# Patient Record
Sex: MALE | Race: WHITE | HISPANIC OR LATINO | URBAN - METROPOLITAN AREA
[De-identification: names, ages, dates, MRNs, and addresses within clinical notes are randomized per-mention and may not be internally consistent; named-entity substitution may affect disease eponyms.]

---

## 2020-01-01 ENCOUNTER — EMERGENCY (EMERGENCY)
Facility: HOSPITAL | Age: 50
LOS: 1 days | Discharge: HOME | End: 2020-01-01
Attending: STUDENT IN AN ORGANIZED HEALTH CARE EDUCATION/TRAINING PROGRAM
Payer: COMMERCIAL

## 2020-01-01 ENCOUNTER — INPATIENT (INPATIENT)
Facility: HOSPITAL | Age: 50
LOS: 14 days | DRG: 208 | End: 2020-08-27
Attending: HOSPITALIST | Admitting: STUDENT IN AN ORGANIZED HEALTH CARE EDUCATION/TRAINING PROGRAM
Payer: COMMERCIAL

## 2020-01-01 VITALS
WEIGHT: 186.07 LBS | RESPIRATION RATE: 20 BRPM | DIASTOLIC BLOOD PRESSURE: 83 MMHG | OXYGEN SATURATION: 95 % | TEMPERATURE: 100 F | HEIGHT: 65 IN | SYSTOLIC BLOOD PRESSURE: 132 MMHG | HEART RATE: 102 BPM

## 2020-01-01 VITALS
SYSTOLIC BLOOD PRESSURE: 127 MMHG | WEIGHT: 186.07 LBS | RESPIRATION RATE: 18 BRPM | OXYGEN SATURATION: 97 % | HEART RATE: 92 BPM | DIASTOLIC BLOOD PRESSURE: 87 MMHG | TEMPERATURE: 99 F | HEIGHT: 65 IN

## 2020-01-01 VITALS — RESPIRATION RATE: 34 BRPM | HEART RATE: 63 BPM | OXYGEN SATURATION: 60 %

## 2020-01-01 DIAGNOSIS — U07.1 COVID-19: ICD-10-CM

## 2020-01-01 LAB
A1C WITH ESTIMATED AVERAGE GLUCOSE RESULT: 11.7 % — HIGH (ref 4–5.6)
ALBUMIN SERPL ELPH-MCNC: 1.6 G/DL — LOW (ref 3.3–5.2)
ALBUMIN SERPL ELPH-MCNC: 1.6 G/DL — LOW (ref 3.3–5.2)
ALBUMIN SERPL ELPH-MCNC: 1.8 G/DL — LOW (ref 3.3–5.2)
ALBUMIN SERPL ELPH-MCNC: 2.3 G/DL — LOW (ref 3.3–5.2)
ALBUMIN SERPL ELPH-MCNC: 2.5 G/DL — LOW (ref 3.3–5.2)
ALBUMIN SERPL ELPH-MCNC: 2.7 G/DL — LOW (ref 3.3–5.2)
ALBUMIN SERPL ELPH-MCNC: 2.8 G/DL — LOW (ref 3.3–5.2)
ALBUMIN SERPL ELPH-MCNC: 2.9 G/DL — LOW (ref 3.3–5.2)
ALBUMIN SERPL ELPH-MCNC: 3 G/DL — LOW (ref 3.3–5.2)
ALBUMIN SERPL ELPH-MCNC: 3 G/DL — LOW (ref 3.3–5.2)
ALBUMIN SERPL ELPH-MCNC: 3.1 G/DL — LOW (ref 3.3–5.2)
ALBUMIN SERPL ELPH-MCNC: 3.3 G/DL — SIGNIFICANT CHANGE UP (ref 3.3–5.2)
ALBUMIN SERPL ELPH-MCNC: 3.6 G/DL — SIGNIFICANT CHANGE UP (ref 3.3–5.2)
ALBUMIN SERPL ELPH-MCNC: 3.6 G/DL — SIGNIFICANT CHANGE UP (ref 3.3–5.2)
ALBUMIN SERPL ELPH-MCNC: <1 G/DL — LOW (ref 3.3–5.2)
ALP SERPL-CCNC: 148 U/L — HIGH (ref 40–120)
ALP SERPL-CCNC: 194 U/L — HIGH (ref 40–120)
ALP SERPL-CCNC: 223 U/L — HIGH (ref 40–120)
ALP SERPL-CCNC: 224 U/L — HIGH (ref 40–120)
ALP SERPL-CCNC: 286 U/L — HIGH (ref 40–120)
ALP SERPL-CCNC: 314 U/L — HIGH (ref 40–120)
ALP SERPL-CCNC: 314 U/L — HIGH (ref 40–120)
ALP SERPL-CCNC: 66 U/L — SIGNIFICANT CHANGE UP (ref 40–120)
ALP SERPL-CCNC: 67 U/L — SIGNIFICANT CHANGE UP (ref 40–120)
ALP SERPL-CCNC: 68 U/L — SIGNIFICANT CHANGE UP (ref 40–120)
ALP SERPL-CCNC: 69 U/L — SIGNIFICANT CHANGE UP (ref 40–120)
ALP SERPL-CCNC: 69 U/L — SIGNIFICANT CHANGE UP (ref 40–120)
ALP SERPL-CCNC: 70 U/L — SIGNIFICANT CHANGE UP (ref 40–120)
ALP SERPL-CCNC: 73 U/L — SIGNIFICANT CHANGE UP (ref 40–120)
ALP SERPL-CCNC: 73 U/L — SIGNIFICANT CHANGE UP (ref 40–120)
ALP SERPL-CCNC: 84 U/L — SIGNIFICANT CHANGE UP (ref 40–120)
ALP SERPL-CCNC: 86 U/L — SIGNIFICANT CHANGE UP (ref 40–120)
ALP SERPL-CCNC: 91 U/L — SIGNIFICANT CHANGE UP (ref 40–120)
ALP SERPL-CCNC: 98 U/L — SIGNIFICANT CHANGE UP (ref 40–120)
ALT FLD-CCNC: 20 U/L — SIGNIFICANT CHANGE UP
ALT FLD-CCNC: 21 U/L — SIGNIFICANT CHANGE UP
ALT FLD-CCNC: 22 U/L — SIGNIFICANT CHANGE UP
ALT FLD-CCNC: 23 U/L — SIGNIFICANT CHANGE UP
ALT FLD-CCNC: 23 U/L — SIGNIFICANT CHANGE UP
ALT FLD-CCNC: 24 U/L — SIGNIFICANT CHANGE UP
ALT FLD-CCNC: 24 U/L — SIGNIFICANT CHANGE UP
ALT FLD-CCNC: 25 U/L — SIGNIFICANT CHANGE UP
ALT FLD-CCNC: 28 U/L — SIGNIFICANT CHANGE UP
ALT FLD-CCNC: 29 U/L — SIGNIFICANT CHANGE UP
ALT FLD-CCNC: 30 U/L — SIGNIFICANT CHANGE UP
ALT FLD-CCNC: 31 U/L — SIGNIFICANT CHANGE UP
ALT FLD-CCNC: 31 U/L — SIGNIFICANT CHANGE UP
ALT FLD-CCNC: 37 U/L — SIGNIFICANT CHANGE UP
ALT FLD-CCNC: >6450 U/L — HIGH
ANION GAP SERPL CALC-SCNC: 10 MMOL/L — SIGNIFICANT CHANGE UP (ref 5–17)
ANION GAP SERPL CALC-SCNC: 11 MMOL/L — SIGNIFICANT CHANGE UP (ref 5–17)
ANION GAP SERPL CALC-SCNC: 12 MMOL/L — SIGNIFICANT CHANGE UP (ref 5–17)
ANION GAP SERPL CALC-SCNC: 13 MMOL/L — SIGNIFICANT CHANGE UP (ref 5–17)
ANION GAP SERPL CALC-SCNC: 15 MMOL/L — SIGNIFICANT CHANGE UP (ref 5–17)
ANION GAP SERPL CALC-SCNC: 17 MMOL/L — SIGNIFICANT CHANGE UP (ref 5–17)
ANION GAP SERPL CALC-SCNC: 18 MMOL/L — HIGH (ref 5–17)
ANION GAP SERPL CALC-SCNC: 18 MMOL/L — HIGH (ref 5–17)
ANION GAP SERPL CALC-SCNC: 19 MMOL/L — HIGH (ref 5–17)
ANION GAP SERPL CALC-SCNC: 19 MMOL/L — HIGH (ref 5–17)
ANION GAP SERPL CALC-SCNC: 37 MMOL/L — HIGH (ref 5–17)
ANION GAP SERPL CALC-SCNC: 46 MMOL/L — HIGH (ref 5–17)
ANION GAP SERPL CALC-SCNC: 46 MMOL/L — HIGH (ref 5–17)
ANISOCYTOSIS BLD QL: SLIGHT — SIGNIFICANT CHANGE UP
APPEARANCE UR: CLEAR — SIGNIFICANT CHANGE UP
APTT BLD: 33.1 SEC — SIGNIFICANT CHANGE UP (ref 27.5–35.5)
APTT BLD: 80.8 SEC — HIGH (ref 27.5–35.5)
APTT BLD: 90.6 SEC — HIGH (ref 27.5–35.5)
AST SERPL-CCNC: 1040 U/L — HIGH
AST SERPL-CCNC: 12 U/L — SIGNIFICANT CHANGE UP
AST SERPL-CCNC: 13 U/L — SIGNIFICANT CHANGE UP
AST SERPL-CCNC: 14 U/L — SIGNIFICANT CHANGE UP
AST SERPL-CCNC: 15 U/L — SIGNIFICANT CHANGE UP
AST SERPL-CCNC: 16 U/L — SIGNIFICANT CHANGE UP
AST SERPL-CCNC: 21 U/L — SIGNIFICANT CHANGE UP
AST SERPL-CCNC: 24 U/L — SIGNIFICANT CHANGE UP
AST SERPL-CCNC: 27 U/L — SIGNIFICANT CHANGE UP
AST SERPL-CCNC: 31 U/L — SIGNIFICANT CHANGE UP
AST SERPL-CCNC: 32 U/L — SIGNIFICANT CHANGE UP
AST SERPL-CCNC: 44 U/L — HIGH
AST SERPL-CCNC: 991 U/L — HIGH
AST SERPL-CCNC: 991 U/L — HIGH
AST SERPL-CCNC: >7000 U/L — HIGH
BACTERIA # UR AUTO: ABNORMAL
BASE EXCESS BLDA CALC-SCNC: -1.6 MMOL/L — SIGNIFICANT CHANGE UP (ref -2–2)
BASE EXCESS BLDA CALC-SCNC: 2.1 MMOL/L — SIGNIFICANT CHANGE UP (ref -3–3)
BASOPHILS # BLD AUTO: 0 K/UL — SIGNIFICANT CHANGE UP (ref 0–0.2)
BASOPHILS # BLD AUTO: 0.01 K/UL — SIGNIFICANT CHANGE UP (ref 0–0.2)
BASOPHILS # BLD AUTO: 0.02 K/UL — SIGNIFICANT CHANGE UP (ref 0–0.2)
BASOPHILS NFR BLD AUTO: 0 % — SIGNIFICANT CHANGE UP (ref 0–2)
BASOPHILS NFR BLD AUTO: 0.1 % — SIGNIFICANT CHANGE UP (ref 0–2)
BASOPHILS NFR BLD AUTO: 0.2 % — SIGNIFICANT CHANGE UP (ref 0–2)
BASOPHILS NFR BLD AUTO: 0.2 % — SIGNIFICANT CHANGE UP (ref 0–2)
BILIRUB DIRECT SERPL-MCNC: 0.1 MG/DL — SIGNIFICANT CHANGE UP (ref 0–0.3)
BILIRUB DIRECT SERPL-MCNC: 0.2 MG/DL — SIGNIFICANT CHANGE UP (ref 0–0.3)
BILIRUB DIRECT SERPL-MCNC: 1.4 MG/DL — HIGH (ref 0–0.3)
BILIRUB DIRECT SERPL-MCNC: 2.7 MG/DL — HIGH (ref 0–0.3)
BILIRUB INDIRECT FLD-MCNC: 0.1 MG/DL — LOW (ref 0.2–1)
BILIRUB INDIRECT FLD-MCNC: 0.2 MG/DL — SIGNIFICANT CHANGE UP (ref 0.2–1)
BILIRUB INDIRECT FLD-MCNC: 0.3 MG/DL — SIGNIFICANT CHANGE UP (ref 0.2–1)
BILIRUB INDIRECT FLD-MCNC: 0.4 MG/DL — SIGNIFICANT CHANGE UP (ref 0.2–1)
BILIRUB INDIRECT FLD-MCNC: 0.5 MG/DL — SIGNIFICANT CHANGE UP (ref 0.2–1)
BILIRUB INDIRECT FLD-MCNC: 0.8 MG/DL — SIGNIFICANT CHANGE UP (ref 0.2–1)
BILIRUB INDIRECT FLD-MCNC: 1.2 MG/DL — HIGH (ref 0.2–1)
BILIRUB SERPL-MCNC: 0.2 MG/DL — LOW (ref 0.4–2)
BILIRUB SERPL-MCNC: 0.3 MG/DL — LOW (ref 0.4–2)
BILIRUB SERPL-MCNC: 0.4 MG/DL — SIGNIFICANT CHANGE UP (ref 0.4–2)
BILIRUB SERPL-MCNC: 0.5 MG/DL — SIGNIFICANT CHANGE UP (ref 0.4–2)
BILIRUB SERPL-MCNC: 0.6 MG/DL — SIGNIFICANT CHANGE UP (ref 0.4–2)
BILIRUB SERPL-MCNC: 1.9 MG/DL — SIGNIFICANT CHANGE UP (ref 0.4–2)
BILIRUB SERPL-MCNC: 2.2 MG/DL — HIGH (ref 0.4–2)
BILIRUB SERPL-MCNC: 2.4 MG/DL — HIGH (ref 0.4–2)
BILIRUB SERPL-MCNC: 2.8 MG/DL — HIGH (ref 0.4–2)
BILIRUB SERPL-MCNC: 3.9 MG/DL — HIGH (ref 0.4–2)
BILIRUB SERPL-MCNC: 3.9 MG/DL — HIGH (ref 0.4–2)
BILIRUB UR-MCNC: NEGATIVE — SIGNIFICANT CHANGE UP
BLD GP AB SCN SERPL QL: SIGNIFICANT CHANGE UP
BLOOD GAS COMMENTS ARTERIAL: SIGNIFICANT CHANGE UP
BLOOD GAS COMMENTS ARTERIAL: SIGNIFICANT CHANGE UP
BUN SERPL-MCNC: 10 MG/DL — SIGNIFICANT CHANGE UP (ref 8–20)
BUN SERPL-MCNC: 14 MG/DL — SIGNIFICANT CHANGE UP (ref 8–20)
BUN SERPL-MCNC: 15 MG/DL — SIGNIFICANT CHANGE UP (ref 8–20)
BUN SERPL-MCNC: 16 MG/DL — SIGNIFICANT CHANGE UP (ref 8–20)
BUN SERPL-MCNC: 16 MG/DL — SIGNIFICANT CHANGE UP (ref 8–20)
BUN SERPL-MCNC: 17 MG/DL — SIGNIFICANT CHANGE UP (ref 8–20)
BUN SERPL-MCNC: 18 MG/DL — SIGNIFICANT CHANGE UP (ref 8–20)
BUN SERPL-MCNC: 19 MG/DL — SIGNIFICANT CHANGE UP (ref 8–20)
BUN SERPL-MCNC: 19 MG/DL — SIGNIFICANT CHANGE UP (ref 8–20)
BUN SERPL-MCNC: 23 MG/DL — HIGH (ref 8–20)
BUN SERPL-MCNC: 40 MG/DL — HIGH (ref 8–20)
BUN SERPL-MCNC: 45 MG/DL — HIGH (ref 8–20)
BUN SERPL-MCNC: 48 MG/DL — HIGH (ref 8–20)
CALCIUM SERPL-MCNC: 10.2 MG/DL — SIGNIFICANT CHANGE UP (ref 8.6–10.2)
CALCIUM SERPL-MCNC: 12.2 MG/DL — HIGH (ref 8.6–10.2)
CALCIUM SERPL-MCNC: 4.2 MG/DL — CRITICAL LOW (ref 8.6–10.2)
CALCIUM SERPL-MCNC: 6 MG/DL — CRITICAL LOW (ref 8.6–10.2)
CALCIUM SERPL-MCNC: 7.2 MG/DL — LOW (ref 8.6–10.2)
CALCIUM SERPL-MCNC: 8.3 MG/DL — LOW (ref 8.6–10.2)
CALCIUM SERPL-MCNC: 8.6 MG/DL — SIGNIFICANT CHANGE UP (ref 8.6–10.2)
CALCIUM SERPL-MCNC: 8.8 MG/DL — SIGNIFICANT CHANGE UP (ref 8.6–10.2)
CALCIUM SERPL-MCNC: 8.9 MG/DL — SIGNIFICANT CHANGE UP (ref 8.6–10.2)
CALCIUM SERPL-MCNC: 8.9 MG/DL — SIGNIFICANT CHANGE UP (ref 8.6–10.2)
CALCIUM SERPL-MCNC: 9.2 MG/DL — SIGNIFICANT CHANGE UP (ref 8.6–10.2)
CALCIUM SERPL-MCNC: 9.2 MG/DL — SIGNIFICANT CHANGE UP (ref 8.6–10.2)
CALCIUM SERPL-MCNC: 9.3 MG/DL — SIGNIFICANT CHANGE UP (ref 8.6–10.2)
CHLORIDE SERPL-SCNC: 108 MMOL/L — HIGH (ref 98–107)
CHLORIDE SERPL-SCNC: 118 MMOL/L — HIGH (ref 98–107)
CHLORIDE SERPL-SCNC: 92 MMOL/L — LOW (ref 98–107)
CHLORIDE SERPL-SCNC: 94 MMOL/L — LOW (ref 98–107)
CHLORIDE SERPL-SCNC: 94 MMOL/L — LOW (ref 98–107)
CHLORIDE SERPL-SCNC: 95 MMOL/L — LOW (ref 98–107)
CHLORIDE SERPL-SCNC: 95 MMOL/L — LOW (ref 98–107)
CHLORIDE SERPL-SCNC: 96 MMOL/L — LOW (ref 98–107)
CHLORIDE SERPL-SCNC: 97 MMOL/L — LOW (ref 98–107)
CHLORIDE SERPL-SCNC: 99 MMOL/L — SIGNIFICANT CHANGE UP (ref 98–107)
CK SERPL-CCNC: 35 U/L — SIGNIFICANT CHANGE UP (ref 30–200)
CO2 SERPL-SCNC: 12 MMOL/L — LOW (ref 22–29)
CO2 SERPL-SCNC: 13 MMOL/L — LOW (ref 22–29)
CO2 SERPL-SCNC: 14 MMOL/L — LOW (ref 22–29)
CO2 SERPL-SCNC: 15 MMOL/L — LOW (ref 22–29)
CO2 SERPL-SCNC: 19 MMOL/L — LOW (ref 22–29)
CO2 SERPL-SCNC: 21 MMOL/L — LOW (ref 22–29)
CO2 SERPL-SCNC: 22 MMOL/L — SIGNIFICANT CHANGE UP (ref 22–29)
CO2 SERPL-SCNC: 22 MMOL/L — SIGNIFICANT CHANGE UP (ref 22–29)
CO2 SERPL-SCNC: 23 MMOL/L — SIGNIFICANT CHANGE UP (ref 22–29)
CO2 SERPL-SCNC: 24 MMOL/L — SIGNIFICANT CHANGE UP (ref 22–29)
CO2 SERPL-SCNC: 25 MMOL/L — SIGNIFICANT CHANGE UP (ref 22–29)
CO2 SERPL-SCNC: 25 MMOL/L — SIGNIFICANT CHANGE UP (ref 22–29)
CO2 SERPL-SCNC: 28 MMOL/L — SIGNIFICANT CHANGE UP (ref 22–29)
COLOR SPEC: YELLOW — SIGNIFICANT CHANGE UP
CREAT SERPL-MCNC: 0.38 MG/DL — LOW (ref 0.5–1.3)
CREAT SERPL-MCNC: 0.4 MG/DL — LOW (ref 0.5–1.3)
CREAT SERPL-MCNC: 0.42 MG/DL — LOW (ref 0.5–1.3)
CREAT SERPL-MCNC: 0.43 MG/DL — LOW (ref 0.5–1.3)
CREAT SERPL-MCNC: 0.43 MG/DL — LOW (ref 0.5–1.3)
CREAT SERPL-MCNC: 0.45 MG/DL — LOW (ref 0.5–1.3)
CREAT SERPL-MCNC: 0.47 MG/DL — LOW (ref 0.5–1.3)
CREAT SERPL-MCNC: 0.49 MG/DL — LOW (ref 0.5–1.3)
CREAT SERPL-MCNC: 0.49 MG/DL — LOW (ref 0.5–1.3)
CREAT SERPL-MCNC: 0.5 MG/DL — SIGNIFICANT CHANGE UP (ref 0.5–1.3)
CREAT SERPL-MCNC: 0.51 MG/DL — SIGNIFICANT CHANGE UP (ref 0.5–1.3)
CREAT SERPL-MCNC: 0.51 MG/DL — SIGNIFICANT CHANGE UP (ref 0.5–1.3)
CREAT SERPL-MCNC: 0.52 MG/DL — SIGNIFICANT CHANGE UP (ref 0.5–1.3)
CREAT SERPL-MCNC: 0.54 MG/DL — SIGNIFICANT CHANGE UP (ref 0.5–1.3)
CREAT SERPL-MCNC: 0.58 MG/DL — SIGNIFICANT CHANGE UP (ref 0.5–1.3)
CREAT SERPL-MCNC: 0.58 MG/DL — SIGNIFICANT CHANGE UP (ref 0.5–1.3)
CREAT SERPL-MCNC: 0.69 MG/DL — SIGNIFICANT CHANGE UP (ref 0.5–1.3)
CREAT SERPL-MCNC: 0.78 MG/DL — SIGNIFICANT CHANGE UP (ref 0.5–1.3)
CREAT SERPL-MCNC: 0.78 MG/DL — SIGNIFICANT CHANGE UP (ref 0.5–1.3)
CREAT SERPL-MCNC: 1.68 MG/DL — HIGH (ref 0.5–1.3)
CREAT SERPL-MCNC: 1.89 MG/DL — HIGH (ref 0.5–1.3)
CREAT SERPL-MCNC: 2.03 MG/DL — HIGH (ref 0.5–1.3)
CREAT SERPL-MCNC: 2.77 MG/DL — HIGH (ref 0.5–1.3)
CRP SERPL-MCNC: 0.7 MG/DL — HIGH (ref 0–0.4)
CRP SERPL-MCNC: 0.72 MG/DL — HIGH (ref 0–0.4)
CRP SERPL-MCNC: 1.01 MG/DL — HIGH (ref 0–0.4)
CRP SERPL-MCNC: 1.09 MG/DL — HIGH (ref 0–0.4)
CRP SERPL-MCNC: 1.12 MG/DL — HIGH (ref 0–0.4)
CRP SERPL-MCNC: 1.22 MG/DL — HIGH (ref 0–0.4)
CRP SERPL-MCNC: 1.32 MG/DL — HIGH (ref 0–0.4)
CRP SERPL-MCNC: 1.39 MG/DL — HIGH (ref 0–0.4)
CRP SERPL-MCNC: 12.22 MG/DL — HIGH (ref 0–0.4)
CRP SERPL-MCNC: 13.83 MG/DL — HIGH (ref 0–0.4)
CRP SERPL-MCNC: 2.04 MG/DL — HIGH (ref 0–0.4)
CRP SERPL-MCNC: 4.6 MG/DL — HIGH (ref 0–0.4)
CRP SERPL-MCNC: 4.98 MG/DL — HIGH (ref 0–0.4)
CRP SERPL-MCNC: 5.38 MG/DL — HIGH (ref 0–0.4)
CRP SERPL-MCNC: 7.06 MG/DL — HIGH (ref 0–0.4)
CRP SERPL-MCNC: 7.96 MG/DL — HIGH (ref 0–0.4)
CULTURE RESULTS: SIGNIFICANT CHANGE UP
D DIMER BLD IA.RAPID-MCNC: 161 NG/ML DDU — SIGNIFICANT CHANGE UP
D DIMER BLD IA.RAPID-MCNC: <150 NG/ML DDU — SIGNIFICANT CHANGE UP
D DIMER BLD IA.RAPID-MCNC: HIGH NG/ML DDU
DIFF PNL FLD: ABNORMAL
EOSINOPHIL # BLD AUTO: 0 K/UL — SIGNIFICANT CHANGE UP (ref 0–0.5)
EOSINOPHIL NFR BLD AUTO: 0 % — SIGNIFICANT CHANGE UP (ref 0–6)
EPI CELLS # UR: SIGNIFICANT CHANGE UP
ESTIMATED AVERAGE GLUCOSE: 289 MG/DL — HIGH (ref 68–114)
FERRITIN SERPL-MCNC: 1033 NG/ML — HIGH (ref 30–400)
FERRITIN SERPL-MCNC: 1182 NG/ML — HIGH (ref 30–400)
FERRITIN SERPL-MCNC: 1275 NG/ML — HIGH (ref 30–400)
FERRITIN SERPL-MCNC: 1391 NG/ML — HIGH (ref 30–400)
FERRITIN SERPL-MCNC: 1445 NG/ML — HIGH (ref 30–400)
FERRITIN SERPL-MCNC: 836 NG/ML — HIGH (ref 30–400)
FERRITIN SERPL-MCNC: 852 NG/ML — HIGH (ref 30–400)
FERRITIN SERPL-MCNC: 869 NG/ML — HIGH (ref 30–400)
FERRITIN SERPL-MCNC: 876 NG/ML — HIGH (ref 30–400)
FERRITIN SERPL-MCNC: 882 NG/ML — HIGH (ref 30–400)
FERRITIN SERPL-MCNC: 883 NG/ML — HIGH (ref 30–400)
FERRITIN SERPL-MCNC: 905 NG/ML — HIGH (ref 30–400)
FERRITIN SERPL-MCNC: 939 NG/ML — HIGH (ref 30–400)
FERRITIN SERPL-MCNC: HIGH NG/ML (ref 30–400)
FIBRINOGEN PPP-MCNC: 1064 MG/DL — CRITICAL HIGH (ref 290–520)
GAS PNL BLDA: SIGNIFICANT CHANGE UP
GLUCOSE BLDC GLUCOMTR-MCNC: 102 MG/DL — HIGH (ref 70–99)
GLUCOSE BLDC GLUCOMTR-MCNC: 119 MG/DL — HIGH (ref 70–99)
GLUCOSE BLDC GLUCOMTR-MCNC: 128 MG/DL — HIGH (ref 70–99)
GLUCOSE BLDC GLUCOMTR-MCNC: 140 MG/DL — HIGH (ref 70–99)
GLUCOSE BLDC GLUCOMTR-MCNC: 158 MG/DL — HIGH (ref 70–99)
GLUCOSE BLDC GLUCOMTR-MCNC: 167 MG/DL — HIGH (ref 70–99)
GLUCOSE BLDC GLUCOMTR-MCNC: 170 MG/DL — HIGH (ref 70–99)
GLUCOSE BLDC GLUCOMTR-MCNC: 171 MG/DL — HIGH (ref 70–99)
GLUCOSE BLDC GLUCOMTR-MCNC: 172 MG/DL — HIGH (ref 70–99)
GLUCOSE BLDC GLUCOMTR-MCNC: 176 MG/DL — HIGH (ref 70–99)
GLUCOSE BLDC GLUCOMTR-MCNC: 179 MG/DL — HIGH (ref 70–99)
GLUCOSE BLDC GLUCOMTR-MCNC: 179 MG/DL — HIGH (ref 70–99)
GLUCOSE BLDC GLUCOMTR-MCNC: 182 MG/DL — HIGH (ref 70–99)
GLUCOSE BLDC GLUCOMTR-MCNC: 182 MG/DL — HIGH (ref 70–99)
GLUCOSE BLDC GLUCOMTR-MCNC: 190 MG/DL — HIGH (ref 70–99)
GLUCOSE BLDC GLUCOMTR-MCNC: 193 MG/DL — HIGH (ref 70–99)
GLUCOSE BLDC GLUCOMTR-MCNC: 195 MG/DL — HIGH (ref 70–99)
GLUCOSE BLDC GLUCOMTR-MCNC: 210 MG/DL — HIGH (ref 70–99)
GLUCOSE BLDC GLUCOMTR-MCNC: 219 MG/DL — HIGH (ref 70–99)
GLUCOSE BLDC GLUCOMTR-MCNC: 220 MG/DL — HIGH (ref 70–99)
GLUCOSE BLDC GLUCOMTR-MCNC: 221 MG/DL — HIGH (ref 70–99)
GLUCOSE BLDC GLUCOMTR-MCNC: 221 MG/DL — HIGH (ref 70–99)
GLUCOSE BLDC GLUCOMTR-MCNC: 222 MG/DL — HIGH (ref 70–99)
GLUCOSE BLDC GLUCOMTR-MCNC: 222 MG/DL — HIGH (ref 70–99)
GLUCOSE BLDC GLUCOMTR-MCNC: 226 MG/DL — HIGH (ref 70–99)
GLUCOSE BLDC GLUCOMTR-MCNC: 226 MG/DL — HIGH (ref 70–99)
GLUCOSE BLDC GLUCOMTR-MCNC: 230 MG/DL — HIGH (ref 70–99)
GLUCOSE BLDC GLUCOMTR-MCNC: 233 MG/DL — HIGH (ref 70–99)
GLUCOSE BLDC GLUCOMTR-MCNC: 234 MG/DL — HIGH (ref 70–99)
GLUCOSE BLDC GLUCOMTR-MCNC: 234 MG/DL — HIGH (ref 70–99)
GLUCOSE BLDC GLUCOMTR-MCNC: 239 MG/DL — HIGH (ref 70–99)
GLUCOSE BLDC GLUCOMTR-MCNC: 242 MG/DL — HIGH (ref 70–99)
GLUCOSE BLDC GLUCOMTR-MCNC: 247 MG/DL — HIGH (ref 70–99)
GLUCOSE BLDC GLUCOMTR-MCNC: 247 MG/DL — HIGH (ref 70–99)
GLUCOSE BLDC GLUCOMTR-MCNC: 253 MG/DL — HIGH (ref 70–99)
GLUCOSE BLDC GLUCOMTR-MCNC: 255 MG/DL — HIGH (ref 70–99)
GLUCOSE BLDC GLUCOMTR-MCNC: 259 MG/DL — HIGH (ref 70–99)
GLUCOSE BLDC GLUCOMTR-MCNC: 261 MG/DL — HIGH (ref 70–99)
GLUCOSE BLDC GLUCOMTR-MCNC: 266 MG/DL — HIGH (ref 70–99)
GLUCOSE BLDC GLUCOMTR-MCNC: 269 MG/DL — HIGH (ref 70–99)
GLUCOSE BLDC GLUCOMTR-MCNC: 270 MG/DL — HIGH (ref 70–99)
GLUCOSE BLDC GLUCOMTR-MCNC: 271 MG/DL — HIGH (ref 70–99)
GLUCOSE BLDC GLUCOMTR-MCNC: 275 MG/DL — HIGH (ref 70–99)
GLUCOSE BLDC GLUCOMTR-MCNC: 277 MG/DL — HIGH (ref 70–99)
GLUCOSE BLDC GLUCOMTR-MCNC: 290 MG/DL — HIGH (ref 70–99)
GLUCOSE BLDC GLUCOMTR-MCNC: 292 MG/DL — HIGH (ref 70–99)
GLUCOSE BLDC GLUCOMTR-MCNC: 292 MG/DL — HIGH (ref 70–99)
GLUCOSE BLDC GLUCOMTR-MCNC: 300 MG/DL — HIGH (ref 70–99)
GLUCOSE BLDC GLUCOMTR-MCNC: 309 MG/DL — HIGH (ref 70–99)
GLUCOSE BLDC GLUCOMTR-MCNC: 31 MG/DL — CRITICAL LOW (ref 70–99)
GLUCOSE BLDC GLUCOMTR-MCNC: 311 MG/DL — HIGH (ref 70–99)
GLUCOSE BLDC GLUCOMTR-MCNC: 314 MG/DL — HIGH (ref 70–99)
GLUCOSE BLDC GLUCOMTR-MCNC: 322 MG/DL — HIGH (ref 70–99)
GLUCOSE BLDC GLUCOMTR-MCNC: 325 MG/DL — HIGH (ref 70–99)
GLUCOSE BLDC GLUCOMTR-MCNC: 329 MG/DL — HIGH (ref 70–99)
GLUCOSE BLDC GLUCOMTR-MCNC: 335 MG/DL — HIGH (ref 70–99)
GLUCOSE BLDC GLUCOMTR-MCNC: 351 MG/DL — HIGH (ref 70–99)
GLUCOSE BLDC GLUCOMTR-MCNC: 384 MG/DL — HIGH (ref 70–99)
GLUCOSE BLDC GLUCOMTR-MCNC: 48 MG/DL — LOW (ref 70–99)
GLUCOSE BLDC GLUCOMTR-MCNC: 89 MG/DL — SIGNIFICANT CHANGE UP (ref 70–99)
GLUCOSE BLDC GLUCOMTR-MCNC: 99 MG/DL — SIGNIFICANT CHANGE UP (ref 70–99)
GLUCOSE SERPL-MCNC: 174 MG/DL — HIGH (ref 70–99)
GLUCOSE SERPL-MCNC: 192 MG/DL — HIGH (ref 70–99)
GLUCOSE SERPL-MCNC: 215 MG/DL — HIGH (ref 70–99)
GLUCOSE SERPL-MCNC: 217 MG/DL — HIGH (ref 70–99)
GLUCOSE SERPL-MCNC: 233 MG/DL — HIGH (ref 70–99)
GLUCOSE SERPL-MCNC: 261 MG/DL — HIGH (ref 70–99)
GLUCOSE SERPL-MCNC: 272 MG/DL — HIGH (ref 70–99)
GLUCOSE SERPL-MCNC: 294 MG/DL — HIGH (ref 70–99)
GLUCOSE SERPL-MCNC: 302 MG/DL — HIGH (ref 70–99)
GLUCOSE SERPL-MCNC: 346 MG/DL — HIGH (ref 70–99)
GLUCOSE SERPL-MCNC: 376 MG/DL — HIGH (ref 70–99)
GLUCOSE SERPL-MCNC: 65 MG/DL — LOW (ref 70–99)
GLUCOSE SERPL-MCNC: 73 MG/DL — SIGNIFICANT CHANGE UP (ref 70–99)
GLUCOSE UR QL: 1000 MG/DL
HCO3 BLDA-SCNC: 23 MMOL/L — SIGNIFICANT CHANGE UP (ref 20–26)
HCO3 BLDA-SCNC: 26 MMOL/L — SIGNIFICANT CHANGE UP (ref 20–26)
HCT VFR BLD CALC: 36.1 % — LOW (ref 39–50)
HCT VFR BLD CALC: 41.2 % — SIGNIFICANT CHANGE UP (ref 39–50)
HCT VFR BLD CALC: 42.3 % — SIGNIFICANT CHANGE UP (ref 39–50)
HCT VFR BLD CALC: 46.2 % — SIGNIFICANT CHANGE UP (ref 39–50)
HCT VFR BLD CALC: 47.5 % — SIGNIFICANT CHANGE UP (ref 39–50)
HCT VFR BLD CALC: 48 % — SIGNIFICANT CHANGE UP (ref 39–50)
HCT VFR BLD CALC: 48.1 % — SIGNIFICANT CHANGE UP (ref 39–50)
HCT VFR BLD CALC: 48.5 % — SIGNIFICANT CHANGE UP (ref 39–50)
HCT VFR BLD CALC: 49 % — SIGNIFICANT CHANGE UP (ref 39–50)
HCT VFR BLD CALC: 49 % — SIGNIFICANT CHANGE UP (ref 39–50)
HCT VFR BLD CALC: 53.6 % — HIGH (ref 39–50)
HCT VFR BLD CALC: 53.7 % — HIGH (ref 39–50)
HGB BLD-MCNC: 10.8 G/DL — LOW (ref 13–17)
HGB BLD-MCNC: 12.4 G/DL — LOW (ref 13–17)
HGB BLD-MCNC: 12.6 G/DL — LOW (ref 13–17)
HGB BLD-MCNC: 14.6 G/DL — SIGNIFICANT CHANGE UP (ref 13–17)
HGB BLD-MCNC: 15.7 G/DL — SIGNIFICANT CHANGE UP (ref 13–17)
HGB BLD-MCNC: 16 G/DL — SIGNIFICANT CHANGE UP (ref 13–17)
HGB BLD-MCNC: 16.1 G/DL — SIGNIFICANT CHANGE UP (ref 13–17)
HGB BLD-MCNC: 16.4 G/DL — SIGNIFICANT CHANGE UP (ref 13–17)
HGB BLD-MCNC: 16.5 G/DL — SIGNIFICANT CHANGE UP (ref 13–17)
HGB BLD-MCNC: 16.6 G/DL — SIGNIFICANT CHANGE UP (ref 13–17)
HGB BLD-MCNC: 17.1 G/DL — HIGH (ref 13–17)
HGB BLD-MCNC: 18 G/DL — HIGH (ref 13–17)
HOROWITZ INDEX BLDA+IHG-RTO: SIGNIFICANT CHANGE UP
HOROWITZ INDEX BLDA+IHG-RTO: SIGNIFICANT CHANGE UP
IMM GRANULOCYTES NFR BLD AUTO: 0.6 % — SIGNIFICANT CHANGE UP (ref 0–1.5)
IMM GRANULOCYTES NFR BLD AUTO: 0.7 % — SIGNIFICANT CHANGE UP (ref 0–1.5)
IMM GRANULOCYTES NFR BLD AUTO: 0.7 % — SIGNIFICANT CHANGE UP (ref 0–1.5)
IMM GRANULOCYTES NFR BLD AUTO: 0.9 % — SIGNIFICANT CHANGE UP (ref 0–1.5)
IMM GRANULOCYTES NFR BLD AUTO: 1.3 % — SIGNIFICANT CHANGE UP (ref 0–1.5)
INR BLD: 1.15 RATIO — SIGNIFICANT CHANGE UP (ref 0.88–1.16)
INR BLD: 10.18 RATIO — CRITICAL HIGH (ref 0.88–1.16)
INR BLD: 2.11 RATIO — HIGH (ref 0.88–1.16)
KETONES UR-MCNC: ABNORMAL
LACTATE SERPL-SCNC: 10.1 MMOL/L — CRITICAL HIGH (ref 0.5–2)
LACTATE SERPL-SCNC: 19.1 MMOL/L — CRITICAL HIGH (ref 0.5–2)
LDH SERPL L TO P-CCNC: 272 U/L — HIGH (ref 98–192)
LDH SERPL L TO P-CCNC: 304 U/L — HIGH (ref 98–192)
LDH SERPL L TO P-CCNC: 313 U/L — HIGH (ref 98–192)
LDH SERPL L TO P-CCNC: 334 U/L — HIGH (ref 98–192)
LDH SERPL L TO P-CCNC: 340 U/L — HIGH (ref 98–192)
LDH SERPL L TO P-CCNC: 341 U/L — HIGH (ref 98–192)
LDH SERPL L TO P-CCNC: 359 U/L — HIGH (ref 98–192)
LDH SERPL L TO P-CCNC: 362 U/L — HIGH (ref 98–192)
LDH SERPL L TO P-CCNC: 370 U/L — HIGH (ref 98–192)
LDH SERPL L TO P-CCNC: 408 U/L — HIGH (ref 98–192)
LDH SERPL L TO P-CCNC: 428 U/L — HIGH (ref 98–192)
LDH SERPL L TO P-CCNC: 444 U/L — HIGH (ref 98–192)
LDH SERPL L TO P-CCNC: 496 U/L — HIGH (ref 98–192)
LDH SERPL L TO P-CCNC: >2332 U/L — HIGH (ref 98–192)
LDH SERPL L TO P-CCNC: >2332 U/L — HIGH (ref 98–192)
LEUKOCYTE ESTERASE UR-ACNC: ABNORMAL
LYMPHOCYTES # BLD AUTO: 0.45 K/UL — LOW (ref 1–3.3)
LYMPHOCYTES # BLD AUTO: 0.79 K/UL — LOW (ref 1–3.3)
LYMPHOCYTES # BLD AUTO: 0.83 K/UL — LOW (ref 1–3.3)
LYMPHOCYTES # BLD AUTO: 0.91 K/UL — LOW (ref 1–3.3)
LYMPHOCYTES # BLD AUTO: 1.12 K/UL — SIGNIFICANT CHANGE UP (ref 1–3.3)
LYMPHOCYTES # BLD AUTO: 1.63 K/UL — SIGNIFICANT CHANGE UP (ref 1–3.3)
LYMPHOCYTES # BLD AUTO: 10.4 % — LOW (ref 13–44)
LYMPHOCYTES # BLD AUTO: 13.3 % — SIGNIFICANT CHANGE UP (ref 13–44)
LYMPHOCYTES # BLD AUTO: 14.7 % — SIGNIFICANT CHANGE UP (ref 13–44)
LYMPHOCYTES # BLD AUTO: 19 % — SIGNIFICANT CHANGE UP (ref 13–44)
LYMPHOCYTES # BLD AUTO: 2.2 % — LOW (ref 13–44)
LYMPHOCYTES # BLD AUTO: 7.8 % — LOW (ref 13–44)
MACROCYTES BLD QL: SLIGHT — SIGNIFICANT CHANGE UP
MAGNESIUM SERPL-MCNC: 1.5 MG/DL — LOW (ref 1.6–2.6)
MAGNESIUM SERPL-MCNC: 2.3 MG/DL — SIGNIFICANT CHANGE UP (ref 1.6–2.6)
MAGNESIUM SERPL-MCNC: 2.3 MG/DL — SIGNIFICANT CHANGE UP (ref 1.8–2.6)
MAGNESIUM SERPL-MCNC: 2.3 MG/DL — SIGNIFICANT CHANGE UP (ref 1.8–2.6)
MAGNESIUM SERPL-MCNC: 2.4 MG/DL — SIGNIFICANT CHANGE UP (ref 1.6–2.6)
MAGNESIUM SERPL-MCNC: 2.5 MG/DL — SIGNIFICANT CHANGE UP (ref 1.6–2.6)
MAGNESIUM SERPL-MCNC: 3.7 MG/DL — HIGH (ref 1.6–2.6)
MAGNESIUM SERPL-MCNC: 4.1 MG/DL — HIGH (ref 1.6–2.6)
MANUAL SMEAR VERIFICATION: SIGNIFICANT CHANGE UP
MCHC RBC-ENTMCNC: 29.8 GM/DL — LOW (ref 32–36)
MCHC RBC-ENTMCNC: 29.9 GM/DL — LOW (ref 32–36)
MCHC RBC-ENTMCNC: 30.1 GM/DL — LOW (ref 32–36)
MCHC RBC-ENTMCNC: 30.3 PG — SIGNIFICANT CHANGE UP (ref 27–34)
MCHC RBC-ENTMCNC: 30.5 PG — SIGNIFICANT CHANGE UP (ref 27–34)
MCHC RBC-ENTMCNC: 30.6 PG — SIGNIFICANT CHANGE UP (ref 27–34)
MCHC RBC-ENTMCNC: 30.7 GM/DL — LOW (ref 32–36)
MCHC RBC-ENTMCNC: 30.7 PG — SIGNIFICANT CHANGE UP (ref 27–34)
MCHC RBC-ENTMCNC: 30.8 PG — SIGNIFICANT CHANGE UP (ref 27–34)
MCHC RBC-ENTMCNC: 30.9 GM/DL — LOW (ref 32–36)
MCHC RBC-ENTMCNC: 30.9 PG — SIGNIFICANT CHANGE UP (ref 27–34)
MCHC RBC-ENTMCNC: 30.9 PG — SIGNIFICANT CHANGE UP (ref 27–34)
MCHC RBC-ENTMCNC: 31 PG — SIGNIFICANT CHANGE UP (ref 27–34)
MCHC RBC-ENTMCNC: 31 PG — SIGNIFICANT CHANGE UP (ref 27–34)
MCHC RBC-ENTMCNC: 31.1 PG — SIGNIFICANT CHANGE UP (ref 27–34)
MCHC RBC-ENTMCNC: 31.2 PG — SIGNIFICANT CHANGE UP (ref 27–34)
MCHC RBC-ENTMCNC: 31.5 PG — SIGNIFICANT CHANGE UP (ref 27–34)
MCHC RBC-ENTMCNC: 33.3 GM/DL — SIGNIFICANT CHANGE UP (ref 32–36)
MCHC RBC-ENTMCNC: 33.5 GM/DL — SIGNIFICANT CHANGE UP (ref 32–36)
MCHC RBC-ENTMCNC: 33.5 GM/DL — SIGNIFICANT CHANGE UP (ref 32–36)
MCHC RBC-ENTMCNC: 33.6 GM/DL — SIGNIFICANT CHANGE UP (ref 32–36)
MCHC RBC-ENTMCNC: 34 GM/DL — SIGNIFICANT CHANGE UP (ref 32–36)
MCHC RBC-ENTMCNC: 34 GM/DL — SIGNIFICANT CHANGE UP (ref 32–36)
MCHC RBC-ENTMCNC: 34.9 GM/DL — SIGNIFICANT CHANGE UP (ref 32–36)
MCV RBC AUTO: 101.1 FL — HIGH (ref 80–100)
MCV RBC AUTO: 103.5 FL — HIGH (ref 80–100)
MCV RBC AUTO: 103.7 FL — HIGH (ref 80–100)
MCV RBC AUTO: 103.9 FL — HIGH (ref 80–100)
MCV RBC AUTO: 90.2 FL — SIGNIFICANT CHANGE UP (ref 80–100)
MCV RBC AUTO: 90.2 FL — SIGNIFICANT CHANGE UP (ref 80–100)
MCV RBC AUTO: 90.8 FL — SIGNIFICANT CHANGE UP (ref 80–100)
MCV RBC AUTO: 90.8 FL — SIGNIFICANT CHANGE UP (ref 80–100)
MCV RBC AUTO: 91.1 FL — SIGNIFICANT CHANGE UP (ref 80–100)
MCV RBC AUTO: 91.4 FL — SIGNIFICANT CHANGE UP (ref 80–100)
MCV RBC AUTO: 92 FL — SIGNIFICANT CHANGE UP (ref 80–100)
MCV RBC AUTO: 99.8 FL — SIGNIFICANT CHANGE UP (ref 80–100)
METAMYELOCYTES # FLD: 0.9 % — HIGH (ref 0–0)
MONOCYTES # BLD AUTO: 0.21 K/UL — SIGNIFICANT CHANGE UP (ref 0–0.9)
MONOCYTES # BLD AUTO: 0.29 K/UL — SIGNIFICANT CHANGE UP (ref 0–0.9)
MONOCYTES # BLD AUTO: 0.46 K/UL — SIGNIFICANT CHANGE UP (ref 0–0.9)
MONOCYTES # BLD AUTO: 0.58 K/UL — SIGNIFICANT CHANGE UP (ref 0–0.9)
MONOCYTES # BLD AUTO: 0.9 K/UL — SIGNIFICANT CHANGE UP (ref 0–0.9)
MONOCYTES # BLD AUTO: 1.08 K/UL — HIGH (ref 0–0.9)
MONOCYTES NFR BLD AUTO: 3.7 % — SIGNIFICANT CHANGE UP (ref 2–14)
MONOCYTES NFR BLD AUTO: 4.4 % — SIGNIFICANT CHANGE UP (ref 2–14)
MONOCYTES NFR BLD AUTO: 5.2 % — SIGNIFICANT CHANGE UP (ref 2–14)
MONOCYTES NFR BLD AUTO: 5.5 % — SIGNIFICANT CHANGE UP (ref 2–14)
MONOCYTES NFR BLD AUTO: 6.6 % — SIGNIFICANT CHANGE UP (ref 2–14)
MONOCYTES NFR BLD AUTO: 7 % — SIGNIFICANT CHANGE UP (ref 2–14)
MYELOCYTES NFR BLD: 1.7 % — HIGH (ref 0–0)
NEUTROPHILS # BLD AUTO: 17.42 K/UL — HIGH (ref 1.8–7.4)
NEUTROPHILS # BLD AUTO: 18.82 K/UL — HIGH (ref 1.8–7.4)
NEUTROPHILS # BLD AUTO: 3.04 K/UL — SIGNIFICANT CHANGE UP (ref 1.8–7.4)
NEUTROPHILS # BLD AUTO: 4.55 K/UL — SIGNIFICANT CHANGE UP (ref 1.8–7.4)
NEUTROPHILS # BLD AUTO: 6.74 K/UL — SIGNIFICANT CHANGE UP (ref 1.8–7.4)
NEUTROPHILS # BLD AUTO: 7.18 K/UL — SIGNIFICANT CHANGE UP (ref 1.8–7.4)
NEUTROPHILS NFR BLD AUTO: 73.1 % — SIGNIFICANT CHANGE UP (ref 43–77)
NEUTROPHILS NFR BLD AUTO: 80.4 % — HIGH (ref 43–77)
NEUTROPHILS NFR BLD AUTO: 80.5 % — HIGH (ref 43–77)
NEUTROPHILS NFR BLD AUTO: 81.6 % — HIGH (ref 43–77)
NEUTROPHILS NFR BLD AUTO: 82.6 % — HIGH (ref 43–77)
NEUTROPHILS NFR BLD AUTO: 92.7 % — HIGH (ref 43–77)
NEUTS BAND # BLD: 0.9 % — SIGNIFICANT CHANGE UP (ref 0–8)
NITRITE UR-MCNC: NEGATIVE — SIGNIFICANT CHANGE UP
NRBC # BLD: 1 /100 — HIGH (ref 0–0)
NT-PROBNP SERPL-SCNC: 2670 PG/ML — HIGH (ref 0–300)
NT-PROBNP SERPL-SCNC: 3336 PG/ML — HIGH (ref 0–300)
NT-PROBNP SERPL-SCNC: 35 PG/ML — SIGNIFICANT CHANGE UP (ref 0–300)
NT-PROBNP SERPL-SCNC: 37 PG/ML — SIGNIFICANT CHANGE UP (ref 0–300)
NT-PROBNP SERPL-SCNC: 40 PG/ML — SIGNIFICANT CHANGE UP (ref 0–300)
NT-PROBNP SERPL-SCNC: 47 PG/ML — SIGNIFICANT CHANGE UP (ref 0–300)
NT-PROBNP SERPL-SCNC: 54 PG/ML — SIGNIFICANT CHANGE UP (ref 0–300)
PCO2 BLDA: 36 MMHG — SIGNIFICANT CHANGE UP (ref 35–45)
PCO2 BLDA: 37 MMHG — SIGNIFICANT CHANGE UP (ref 35–45)
PH BLDA: 7.41 — SIGNIFICANT CHANGE UP (ref 7.35–7.45)
PH BLDA: 7.46 — HIGH (ref 7.35–7.45)
PH UR: 6 — SIGNIFICANT CHANGE UP (ref 5–8)
PHOSPHATE SERPL-MCNC: 15.6 MG/DL — HIGH (ref 2.4–4.7)
PHOSPHATE SERPL-MCNC: 17.1 MG/DL — HIGH (ref 2.4–4.7)
PHOSPHATE SERPL-MCNC: 2.8 MG/DL — SIGNIFICANT CHANGE UP (ref 2.4–4.7)
PHOSPHATE SERPL-MCNC: 3 MG/DL — SIGNIFICANT CHANGE UP (ref 2.4–4.7)
PHOSPHATE SERPL-MCNC: 3 MG/DL — SIGNIFICANT CHANGE UP (ref 2.4–4.7)
PHOSPHATE SERPL-MCNC: 3.7 MG/DL — SIGNIFICANT CHANGE UP (ref 2.4–4.7)
PHOSPHATE SERPL-MCNC: 3.9 MG/DL — SIGNIFICANT CHANGE UP (ref 2.4–4.7)
PHOSPHATE SERPL-MCNC: 4.4 MG/DL — SIGNIFICANT CHANGE UP (ref 2.4–4.7)
PHOSPHATE SERPL-MCNC: 4.5 MG/DL — SIGNIFICANT CHANGE UP (ref 2.4–4.7)
PLAT MORPH BLD: NORMAL — SIGNIFICANT CHANGE UP
PLATELET # BLD AUTO: 111 K/UL — LOW (ref 150–400)
PLATELET # BLD AUTO: 230 K/UL — SIGNIFICANT CHANGE UP (ref 150–400)
PLATELET # BLD AUTO: 261 K/UL — SIGNIFICANT CHANGE UP (ref 150–400)
PLATELET # BLD AUTO: 274 K/UL — SIGNIFICANT CHANGE UP (ref 150–400)
PLATELET # BLD AUTO: 317 K/UL — SIGNIFICANT CHANGE UP (ref 150–400)
PLATELET # BLD AUTO: 36 K/UL — LOW (ref 150–400)
PLATELET # BLD AUTO: 388 K/UL — SIGNIFICANT CHANGE UP (ref 150–400)
PLATELET # BLD AUTO: 402 K/UL — HIGH (ref 150–400)
PLATELET # BLD AUTO: 45 K/UL — LOW (ref 150–400)
PLATELET # BLD AUTO: 532 K/UL — HIGH (ref 150–400)
PLATELET # BLD AUTO: 594 K/UL — HIGH (ref 150–400)
PLATELET # BLD AUTO: 630 K/UL — HIGH (ref 150–400)
PO2 BLDA: 48 MMHG — CRITICAL LOW (ref 83–108)
PO2 BLDA: 65 MMHG — LOW (ref 83–108)
POTASSIUM SERPL-MCNC: 3.8 MMOL/L — SIGNIFICANT CHANGE UP (ref 3.5–5.3)
POTASSIUM SERPL-MCNC: 4.3 MMOL/L — SIGNIFICANT CHANGE UP (ref 3.5–5.3)
POTASSIUM SERPL-MCNC: 4.5 MMOL/L — SIGNIFICANT CHANGE UP (ref 3.5–5.3)
POTASSIUM SERPL-MCNC: 4.6 MMOL/L — SIGNIFICANT CHANGE UP (ref 3.5–5.3)
POTASSIUM SERPL-MCNC: 4.6 MMOL/L — SIGNIFICANT CHANGE UP (ref 3.5–5.3)
POTASSIUM SERPL-MCNC: 4.7 MMOL/L — SIGNIFICANT CHANGE UP (ref 3.5–5.3)
POTASSIUM SERPL-MCNC: 4.7 MMOL/L — SIGNIFICANT CHANGE UP (ref 3.5–5.3)
POTASSIUM SERPL-MCNC: 4.8 MMOL/L — SIGNIFICANT CHANGE UP (ref 3.5–5.3)
POTASSIUM SERPL-MCNC: 4.9 MMOL/L — SIGNIFICANT CHANGE UP (ref 3.5–5.3)
POTASSIUM SERPL-MCNC: 5.1 MMOL/L — SIGNIFICANT CHANGE UP (ref 3.5–5.3)
POTASSIUM SERPL-MCNC: 5.2 MMOL/L — SIGNIFICANT CHANGE UP (ref 3.5–5.3)
POTASSIUM SERPL-MCNC: 5.8 MMOL/L — HIGH (ref 3.5–5.3)
POTASSIUM SERPL-MCNC: 5.9 MMOL/L — HIGH (ref 3.5–5.3)
POTASSIUM SERPL-SCNC: 3.8 MMOL/L — SIGNIFICANT CHANGE UP (ref 3.5–5.3)
POTASSIUM SERPL-SCNC: 4.3 MMOL/L — SIGNIFICANT CHANGE UP (ref 3.5–5.3)
POTASSIUM SERPL-SCNC: 4.5 MMOL/L — SIGNIFICANT CHANGE UP (ref 3.5–5.3)
POTASSIUM SERPL-SCNC: 4.6 MMOL/L — SIGNIFICANT CHANGE UP (ref 3.5–5.3)
POTASSIUM SERPL-SCNC: 4.6 MMOL/L — SIGNIFICANT CHANGE UP (ref 3.5–5.3)
POTASSIUM SERPL-SCNC: 4.7 MMOL/L — SIGNIFICANT CHANGE UP (ref 3.5–5.3)
POTASSIUM SERPL-SCNC: 4.7 MMOL/L — SIGNIFICANT CHANGE UP (ref 3.5–5.3)
POTASSIUM SERPL-SCNC: 4.8 MMOL/L — SIGNIFICANT CHANGE UP (ref 3.5–5.3)
POTASSIUM SERPL-SCNC: 4.9 MMOL/L — SIGNIFICANT CHANGE UP (ref 3.5–5.3)
POTASSIUM SERPL-SCNC: 5.1 MMOL/L — SIGNIFICANT CHANGE UP (ref 3.5–5.3)
POTASSIUM SERPL-SCNC: 5.2 MMOL/L — SIGNIFICANT CHANGE UP (ref 3.5–5.3)
POTASSIUM SERPL-SCNC: 5.8 MMOL/L — HIGH (ref 3.5–5.3)
POTASSIUM SERPL-SCNC: 5.9 MMOL/L — HIGH (ref 3.5–5.3)
PROCALCITONIN SERPL-MCNC: 0.06 NG/ML — SIGNIFICANT CHANGE UP (ref 0.02–0.1)
PROCALCITONIN SERPL-MCNC: 0.1 NG/ML — SIGNIFICANT CHANGE UP (ref 0.02–0.1)
PROCALCITONIN SERPL-MCNC: 0.13 NG/ML — HIGH (ref 0.02–0.1)
PROCALCITONIN SERPL-MCNC: 0.15 NG/ML — HIGH (ref 0.02–0.1)
PROCALCITONIN SERPL-MCNC: 0.27 NG/ML — HIGH (ref 0.02–0.1)
PROT SERPL-MCNC: 2.2 G/DL — LOW (ref 6.6–8.7)
PROT SERPL-MCNC: 3.5 G/DL — LOW (ref 6.6–8.7)
PROT SERPL-MCNC: 3.5 G/DL — LOW (ref 6.6–8.7)
PROT SERPL-MCNC: 4.2 G/DL — LOW (ref 6.6–8.7)
PROT SERPL-MCNC: 5.3 G/DL — LOW (ref 6.6–8.7)
PROT SERPL-MCNC: 5.7 G/DL — LOW (ref 6.6–8.7)
PROT SERPL-MCNC: 6 G/DL — LOW (ref 6.6–8.7)
PROT SERPL-MCNC: 6.1 G/DL — LOW (ref 6.6–8.7)
PROT SERPL-MCNC: 6.4 G/DL — LOW (ref 6.6–8.7)
PROT SERPL-MCNC: 6.4 G/DL — LOW (ref 6.6–8.7)
PROT SERPL-MCNC: 6.5 G/DL — LOW (ref 6.6–8.7)
PROT SERPL-MCNC: 6.5 G/DL — LOW (ref 6.6–8.7)
PROT SERPL-MCNC: 6.6 G/DL — SIGNIFICANT CHANGE UP (ref 6.6–8.7)
PROT SERPL-MCNC: 6.9 G/DL — SIGNIFICANT CHANGE UP (ref 6.6–8.7)
PROT SERPL-MCNC: 7 G/DL — SIGNIFICANT CHANGE UP (ref 6.6–8.7)
PROT SERPL-MCNC: 7.2 G/DL — SIGNIFICANT CHANGE UP (ref 6.6–8.7)
PROT SERPL-MCNC: 7.3 G/DL — SIGNIFICANT CHANGE UP (ref 6.6–8.7)
PROT SERPL-MCNC: 7.7 G/DL — SIGNIFICANT CHANGE UP (ref 6.6–8.7)
PROT SERPL-MCNC: 7.7 G/DL — SIGNIFICANT CHANGE UP (ref 6.6–8.7)
PROT UR-MCNC: 100 MG/DL
PROTHROM AB SERPL-ACNC: 105.7 SEC — HIGH (ref 10.6–13.6)
PROTHROM AB SERPL-ACNC: 13.3 SEC — SIGNIFICANT CHANGE UP (ref 10.6–13.6)
PROTHROM AB SERPL-ACNC: 23.6 SEC — HIGH (ref 10.6–13.6)
RBC # BLD: 3.48 M/UL — LOW (ref 4.2–5.8)
RBC # BLD: 3.98 M/UL — LOW (ref 4.2–5.8)
RBC # BLD: 4.07 M/UL — LOW (ref 4.2–5.8)
RBC # BLD: 4.7 M/UL — SIGNIFICANT CHANGE UP (ref 4.2–5.8)
RBC # BLD: 5.09 M/UL — SIGNIFICANT CHANGE UP (ref 4.2–5.8)
RBC # BLD: 5.23 M/UL — SIGNIFICANT CHANGE UP (ref 4.2–5.8)
RBC # BLD: 5.25 M/UL — SIGNIFICANT CHANGE UP (ref 4.2–5.8)
RBC # BLD: 5.34 M/UL — SIGNIFICANT CHANGE UP (ref 4.2–5.8)
RBC # BLD: 5.38 M/UL — SIGNIFICANT CHANGE UP (ref 4.2–5.8)
RBC # BLD: 5.38 M/UL — SIGNIFICANT CHANGE UP (ref 4.2–5.8)
RBC # BLD: 5.43 M/UL — SIGNIFICANT CHANGE UP (ref 4.2–5.8)
RBC # BLD: 5.94 M/UL — HIGH (ref 4.2–5.8)
RBC # FLD: 11.9 % — SIGNIFICANT CHANGE UP (ref 10.3–14.5)
RBC # FLD: 12.1 % — SIGNIFICANT CHANGE UP (ref 10.3–14.5)
RBC # FLD: 12.2 % — SIGNIFICANT CHANGE UP (ref 10.3–14.5)
RBC # FLD: 12.3 % — SIGNIFICANT CHANGE UP (ref 10.3–14.5)
RBC # FLD: 12.3 % — SIGNIFICANT CHANGE UP (ref 10.3–14.5)
RBC # FLD: 12.6 % — SIGNIFICANT CHANGE UP (ref 10.3–14.5)
RBC # FLD: 12.6 % — SIGNIFICANT CHANGE UP (ref 10.3–14.5)
RBC # FLD: 13.1 % — SIGNIFICANT CHANGE UP (ref 10.3–14.5)
RBC # FLD: 13.4 % — SIGNIFICANT CHANGE UP (ref 10.3–14.5)
RBC # FLD: 13.5 % — SIGNIFICANT CHANGE UP (ref 10.3–14.5)
RBC BLD AUTO: ABNORMAL
RBC CASTS # UR COMP ASSIST: ABNORMAL /HPF (ref 0–4)
SAO2 % BLDA: 86 % — LOW (ref 95–99)
SAO2 % BLDA: 93 % — LOW (ref 95–99)
SARS-COV-2 IGG SERPL QL IA: NEGATIVE — SIGNIFICANT CHANGE UP
SARS-COV-2 IGM SERPL IA-ACNC: 0.3 INDEX — SIGNIFICANT CHANGE UP
SARS-COV-2 RNA SPEC QL NAA+PROBE: DETECTED
SARS-COV-2 RNA SPEC QL NAA+PROBE: DETECTED
SODIUM SERPL-SCNC: 129 MMOL/L — LOW (ref 135–145)
SODIUM SERPL-SCNC: 132 MMOL/L — LOW (ref 135–145)
SODIUM SERPL-SCNC: 133 MMOL/L — LOW (ref 135–145)
SODIUM SERPL-SCNC: 135 MMOL/L — SIGNIFICANT CHANGE UP (ref 135–145)
SODIUM SERPL-SCNC: 135 MMOL/L — SIGNIFICANT CHANGE UP (ref 135–145)
SODIUM SERPL-SCNC: 136 MMOL/L — SIGNIFICANT CHANGE UP (ref 135–145)
SODIUM SERPL-SCNC: 137 MMOL/L — SIGNIFICANT CHANGE UP (ref 135–145)
SODIUM SERPL-SCNC: 138 MMOL/L — SIGNIFICANT CHANGE UP (ref 135–145)
SODIUM SERPL-SCNC: 142 MMOL/L — SIGNIFICANT CHANGE UP (ref 135–145)
SODIUM SERPL-SCNC: 142 MMOL/L — SIGNIFICANT CHANGE UP (ref 135–145)
SODIUM SERPL-SCNC: 151 MMOL/L — HIGH (ref 135–145)
SODIUM SERPL-SCNC: 153 MMOL/L — HIGH (ref 135–145)
SODIUM SERPL-SCNC: 155 MMOL/L — HIGH (ref 135–145)
SP GR SPEC: 1.01 — SIGNIFICANT CHANGE UP (ref 1.01–1.02)
SPECIMEN SOURCE: SIGNIFICANT CHANGE UP
TRIGL SERPL-MCNC: 124 MG/DL — SIGNIFICANT CHANGE UP (ref 10–200)
TROPONIN T SERPL-MCNC: 0.45 NG/ML — HIGH (ref 0–0.06)
TROPONIN T SERPL-MCNC: <0.01 NG/ML — SIGNIFICANT CHANGE UP (ref 0–0.06)
UROBILINOGEN FLD QL: NEGATIVE MG/DL — SIGNIFICANT CHANGE UP
VARIANT LYMPHS # BLD: 0.9 % — SIGNIFICANT CHANGE UP (ref 0–6)
WBC # BLD: 13.58 K/UL — HIGH (ref 3.8–10.5)
WBC # BLD: 17.23 K/UL — HIGH (ref 3.8–10.5)
WBC # BLD: 20.32 K/UL — HIGH (ref 3.8–10.5)
WBC # BLD: 20.86 K/UL — HIGH (ref 3.8–10.5)
WBC # BLD: 25.17 K/UL — HIGH (ref 3.8–10.5)
WBC # BLD: 26.4 K/UL — HIGH (ref 3.8–10.5)
WBC # BLD: 4.16 K/UL — SIGNIFICANT CHANGE UP (ref 3.8–10.5)
WBC # BLD: 5.65 K/UL — SIGNIFICANT CHANGE UP (ref 3.8–10.5)
WBC # BLD: 6.07 K/UL — SIGNIFICANT CHANGE UP (ref 3.8–10.5)
WBC # BLD: 8.39 K/UL — SIGNIFICANT CHANGE UP (ref 3.8–10.5)
WBC # BLD: 8.79 K/UL — SIGNIFICANT CHANGE UP (ref 3.8–10.5)
WBC # BLD: 9.77 K/UL — SIGNIFICANT CHANGE UP (ref 3.8–10.5)
WBC # FLD AUTO: 13.58 K/UL — HIGH (ref 3.8–10.5)
WBC # FLD AUTO: 17.23 K/UL — HIGH (ref 3.8–10.5)
WBC # FLD AUTO: 20.32 K/UL — HIGH (ref 3.8–10.5)
WBC # FLD AUTO: 20.86 K/UL — HIGH (ref 3.8–10.5)
WBC # FLD AUTO: 25.17 K/UL — HIGH (ref 3.8–10.5)
WBC # FLD AUTO: 26.4 K/UL — HIGH (ref 3.8–10.5)
WBC # FLD AUTO: 4.16 K/UL — SIGNIFICANT CHANGE UP (ref 3.8–10.5)
WBC # FLD AUTO: 5.65 K/UL — SIGNIFICANT CHANGE UP (ref 3.8–10.5)
WBC # FLD AUTO: 6.07 K/UL — SIGNIFICANT CHANGE UP (ref 3.8–10.5)
WBC # FLD AUTO: 8.39 K/UL — SIGNIFICANT CHANGE UP (ref 3.8–10.5)
WBC # FLD AUTO: 8.79 K/UL — SIGNIFICANT CHANGE UP (ref 3.8–10.5)
WBC # FLD AUTO: 9.77 K/UL — SIGNIFICANT CHANGE UP (ref 3.8–10.5)
WBC UR QL: ABNORMAL

## 2020-01-01 PROCEDURE — 99232 SBSQ HOSP IP/OBS MODERATE 35: CPT

## 2020-01-01 PROCEDURE — 99223 1ST HOSP IP/OBS HIGH 75: CPT

## 2020-01-01 PROCEDURE — 99233 SBSQ HOSP IP/OBS HIGH 50: CPT

## 2020-01-01 PROCEDURE — 99284 EMERGENCY DEPT VISIT MOD MDM: CPT

## 2020-01-01 PROCEDURE — 99285 EMERGENCY DEPT VISIT HI MDM: CPT

## 2020-01-01 PROCEDURE — 90937 HEMODIALYSIS REPEATED EVAL: CPT

## 2020-01-01 PROCEDURE — 12345: CPT | Mod: NC

## 2020-01-01 PROCEDURE — 71275 CT ANGIOGRAPHY CHEST: CPT | Mod: 26

## 2020-01-01 PROCEDURE — U0003: CPT

## 2020-01-01 PROCEDURE — 71045 X-RAY EXAM CHEST 1 VIEW: CPT | Mod: 26,77

## 2020-01-01 PROCEDURE — 99291 CRITICAL CARE FIRST HOUR: CPT

## 2020-01-01 PROCEDURE — 93306 TTE W/DOPPLER COMPLETE: CPT | Mod: 26

## 2020-01-01 PROCEDURE — 71045 X-RAY EXAM CHEST 1 VIEW: CPT | Mod: 26,76

## 2020-01-01 PROCEDURE — 99283 EMERGENCY DEPT VISIT LOW MDM: CPT

## 2020-01-01 PROCEDURE — 99255 IP/OBS CONSLTJ NEW/EST HI 80: CPT | Mod: 25

## 2020-01-01 PROCEDURE — 71045 X-RAY EXAM CHEST 1 VIEW: CPT | Mod: 26

## 2020-01-01 PROCEDURE — 99254 IP/OBS CNSLTJ NEW/EST MOD 60: CPT

## 2020-01-01 PROCEDURE — 93010 ELECTROCARDIOGRAM REPORT: CPT

## 2020-01-01 RX ORDER — FUROSEMIDE 40 MG
40 TABLET ORAL ONCE
Refills: 0 | Status: COMPLETED | OUTPATIENT
Start: 2020-01-01 | End: 2020-01-01

## 2020-01-01 RX ORDER — KETAMINE HYDROCHLORIDE 100 MG/ML
0.25 INJECTION INTRAMUSCULAR; INTRAVENOUS
Qty: 1000 | Refills: 0 | Status: DISCONTINUED | OUTPATIENT
Start: 2020-01-01 | End: 2020-01-01

## 2020-01-01 RX ORDER — INSULIN GLARGINE 100 [IU]/ML
30 INJECTION, SOLUTION SUBCUTANEOUS AT BEDTIME
Refills: 0 | Status: DISCONTINUED | OUTPATIENT
Start: 2020-01-01 | End: 2020-01-01

## 2020-01-01 RX ORDER — GLUCAGON INJECTION, SOLUTION 0.5 MG/.1ML
1 INJECTION, SOLUTION SUBCUTANEOUS ONCE
Refills: 0 | Status: DISCONTINUED | OUTPATIENT
Start: 2020-01-01 | End: 2020-01-01

## 2020-01-01 RX ORDER — THIAMINE MONONITRATE (VIT B1) 100 MG
100 TABLET ORAL DAILY
Refills: 0 | Status: DISCONTINUED | OUTPATIENT
Start: 2020-01-01 | End: 2020-01-01

## 2020-01-01 RX ORDER — DEXTROSE 50 % IN WATER 50 %
12.5 SYRINGE (ML) INTRAVENOUS ONCE
Refills: 0 | Status: DISCONTINUED | OUTPATIENT
Start: 2020-01-01 | End: 2020-01-01

## 2020-01-01 RX ORDER — REMDESIVIR 5 MG/ML
200 INJECTION INTRAVENOUS EVERY 24 HOURS
Refills: 0 | Status: COMPLETED | OUTPATIENT
Start: 2020-01-01 | End: 2020-01-01

## 2020-01-01 RX ORDER — OMEGA-3 ACID ETHYL ESTERS 1 G
0 CAPSULE ORAL
Qty: 360 | Refills: 0 | DISCHARGE

## 2020-01-01 RX ORDER — SIMVASTATIN 20 MG/1
0 TABLET, FILM COATED ORAL
Qty: 14 | Refills: 0 | DISCHARGE

## 2020-01-01 RX ORDER — INSULIN LISPRO 100/ML
7 VIAL (ML) SUBCUTANEOUS EVERY 6 HOURS
Refills: 0 | Status: DISCONTINUED | OUTPATIENT
Start: 2020-01-01 | End: 2020-01-01

## 2020-01-01 RX ORDER — INSULIN LISPRO 100/ML
7 VIAL (ML) SUBCUTANEOUS ONCE
Refills: 0 | Status: COMPLETED | OUTPATIENT
Start: 2020-01-01 | End: 2020-01-01

## 2020-01-01 RX ORDER — REMDESIVIR 5 MG/ML
100 INJECTION INTRAVENOUS EVERY 24 HOURS
Refills: 0 | Status: COMPLETED | OUTPATIENT
Start: 2020-01-01 | End: 2020-01-01

## 2020-01-01 RX ORDER — SODIUM CHLORIDE 9 MG/ML
1000 INJECTION, SOLUTION INTRAVENOUS ONCE
Refills: 0 | Status: COMPLETED | OUTPATIENT
Start: 2020-01-01 | End: 2020-01-01

## 2020-01-01 RX ORDER — INSULIN LISPRO 100/ML
VIAL (ML) SUBCUTANEOUS
Refills: 0 | Status: DISCONTINUED | OUTPATIENT
Start: 2020-01-01 | End: 2020-01-01

## 2020-01-01 RX ORDER — DEXAMETHASONE 0.5 MG/5ML
5 ELIXIR ORAL ONCE
Refills: 0 | Status: COMPLETED | OUTPATIENT
Start: 2020-01-01 | End: 2020-01-01

## 2020-01-01 RX ORDER — SODIUM BICARBONATE 1 MEQ/ML
50 SYRINGE (ML) INTRAVENOUS
Refills: 0 | Status: COMPLETED | OUTPATIENT
Start: 2020-01-01 | End: 2020-01-01

## 2020-01-01 RX ORDER — ASPIRIN/CALCIUM CARB/MAGNESIUM 324 MG
1 TABLET ORAL
Qty: 0 | Refills: 0 | DISCHARGE

## 2020-01-01 RX ORDER — MORPHINE SULFATE 50 MG/1
1 CAPSULE, EXTENDED RELEASE ORAL ONCE
Refills: 0 | Status: DISCONTINUED | OUTPATIENT
Start: 2020-01-01 | End: 2020-01-01

## 2020-01-01 RX ORDER — SODIUM CHLORIDE 9 MG/ML
1000 INJECTION, SOLUTION INTRAVENOUS
Refills: 0 | Status: DISCONTINUED | OUTPATIENT
Start: 2020-01-01 | End: 2020-01-01

## 2020-01-01 RX ORDER — INSULIN GLARGINE 100 [IU]/ML
25 INJECTION, SOLUTION SUBCUTANEOUS AT BEDTIME
Refills: 0 | Status: DISCONTINUED | OUTPATIENT
Start: 2020-01-01 | End: 2020-01-01

## 2020-01-01 RX ORDER — INSULIN LISPRO 100/ML
4 VIAL (ML) SUBCUTANEOUS
Refills: 0 | Status: DISCONTINUED | OUTPATIENT
Start: 2020-01-01 | End: 2020-01-01

## 2020-01-01 RX ORDER — ZINC SULFATE TAB 220 MG (50 MG ZINC EQUIVALENT) 220 (50 ZN) MG
1 TAB ORAL
Qty: 0 | Refills: 0 | DISCHARGE

## 2020-01-01 RX ORDER — MORPHINE SULFATE 50 MG/1
2 CAPSULE, EXTENDED RELEASE ORAL EVERY 4 HOURS
Refills: 0 | Status: DISCONTINUED | OUTPATIENT
Start: 2020-01-01 | End: 2020-01-01

## 2020-01-01 RX ORDER — LANOLIN ALCOHOL/MO/W.PET/CERES
5 CREAM (GRAM) TOPICAL AT BEDTIME
Refills: 0 | Status: DISCONTINUED | OUTPATIENT
Start: 2020-01-01 | End: 2020-01-01

## 2020-01-01 RX ORDER — DEXMEDETOMIDINE HYDROCHLORIDE IN 0.9% SODIUM CHLORIDE 4 UG/ML
0.3 INJECTION INTRAVENOUS
Qty: 200 | Refills: 0 | Status: DISCONTINUED | OUTPATIENT
Start: 2020-01-01 | End: 2020-01-01

## 2020-01-01 RX ORDER — ENOXAPARIN SODIUM 100 MG/ML
40 INJECTION SUBCUTANEOUS EVERY 12 HOURS
Refills: 0 | Status: DISCONTINUED | OUTPATIENT
Start: 2020-01-01 | End: 2020-01-01

## 2020-01-01 RX ORDER — VASOPRESSIN 20 [USP'U]/ML
0.04 INJECTION INTRAVENOUS
Qty: 50 | Refills: 0 | Status: DISCONTINUED | OUTPATIENT
Start: 2020-01-01 | End: 2020-01-01

## 2020-01-01 RX ORDER — DEXTROSE 50 % IN WATER 50 %
25 SYRINGE (ML) INTRAVENOUS ONCE
Refills: 0 | Status: DISCONTINUED | OUTPATIENT
Start: 2020-01-01 | End: 2020-01-01

## 2020-01-01 RX ORDER — DEXTROSE 50 % IN WATER 50 %
12.5 SYRINGE (ML) INTRAVENOUS ONCE
Refills: 0 | Status: COMPLETED | OUTPATIENT
Start: 2020-01-01 | End: 2020-01-01

## 2020-01-01 RX ORDER — ONDANSETRON 8 MG/1
4 TABLET, FILM COATED ORAL EVERY 6 HOURS
Refills: 0 | Status: DISCONTINUED | OUTPATIENT
Start: 2020-01-01 | End: 2020-01-01

## 2020-01-01 RX ORDER — INSULIN GLARGINE 100 [IU]/ML
15 INJECTION, SOLUTION SUBCUTANEOUS AT BEDTIME
Refills: 0 | Status: DISCONTINUED | OUTPATIENT
Start: 2020-01-01 | End: 2020-01-01

## 2020-01-01 RX ORDER — LISINOPRIL 2.5 MG/1
10 TABLET ORAL DAILY
Refills: 0 | Status: DISCONTINUED | OUTPATIENT
Start: 2020-01-01 | End: 2020-01-01

## 2020-01-01 RX ORDER — PIPERACILLIN AND TAZOBACTAM 4; .5 G/20ML; G/20ML
3.38 INJECTION, POWDER, LYOPHILIZED, FOR SOLUTION INTRAVENOUS EVERY 12 HOURS
Refills: 0 | Status: DISCONTINUED | OUTPATIENT
Start: 2020-01-01 | End: 2020-01-01

## 2020-01-01 RX ORDER — DEXTROSE 50 % IN WATER 50 %
15 SYRINGE (ML) INTRAVENOUS ONCE
Refills: 0 | Status: DISCONTINUED | OUTPATIENT
Start: 2020-01-01 | End: 2020-01-01

## 2020-01-01 RX ORDER — REMDESIVIR 5 MG/ML
INJECTION INTRAVENOUS
Refills: 0 | Status: COMPLETED | OUTPATIENT
Start: 2020-01-01 | End: 2020-01-01

## 2020-01-01 RX ORDER — ASCORBIC ACID 60 MG
1500 TABLET,CHEWABLE ORAL DAILY
Refills: 0 | Status: DISCONTINUED | OUTPATIENT
Start: 2020-01-01 | End: 2020-01-01

## 2020-01-01 RX ORDER — AMIODARONE HYDROCHLORIDE 400 MG/1
1 TABLET ORAL
Qty: 900 | Refills: 0 | Status: DISCONTINUED | OUTPATIENT
Start: 2020-01-01 | End: 2020-01-01

## 2020-01-01 RX ORDER — HEPARIN SODIUM 5000 [USP'U]/ML
5000 INJECTION INTRAVENOUS; SUBCUTANEOUS EVERY 8 HOURS
Refills: 0 | Status: DISCONTINUED | OUTPATIENT
Start: 2020-01-01 | End: 2020-01-01

## 2020-01-01 RX ORDER — DULAGLUTIDE 4.5 MG/.5ML
0 INJECTION, SOLUTION SUBCUTANEOUS
Qty: 2 | Refills: 0 | DISCHARGE

## 2020-01-01 RX ORDER — INSULIN GLARGINE 100 [IU]/ML
20 INJECTION, SOLUTION SUBCUTANEOUS AT BEDTIME
Refills: 0 | Status: DISCONTINUED | OUTPATIENT
Start: 2020-01-01 | End: 2020-01-01

## 2020-01-01 RX ORDER — CHLORHEXIDINE GLUCONATE 213 G/1000ML
15 SOLUTION TOPICAL EVERY 12 HOURS
Refills: 0 | Status: DISCONTINUED | OUTPATIENT
Start: 2020-01-01 | End: 2020-01-01

## 2020-01-01 RX ORDER — SODIUM BICARBONATE 1 MEQ/ML
100 SYRINGE (ML) INTRAVENOUS ONCE
Refills: 0 | Status: COMPLETED | OUTPATIENT
Start: 2020-01-01 | End: 2020-01-01

## 2020-01-01 RX ORDER — NOREPINEPHRINE BITARTRATE/D5W 8 MG/250ML
0.05 PLASTIC BAG, INJECTION (ML) INTRAVENOUS
Qty: 16 | Refills: 0 | Status: DISCONTINUED | OUTPATIENT
Start: 2020-01-01 | End: 2020-01-01

## 2020-01-01 RX ORDER — EPINEPHRINE 0.3 MG/.3ML
0.4 INJECTION INTRAMUSCULAR; SUBCUTANEOUS
Qty: 8 | Refills: 0 | Status: DISCONTINUED | OUTPATIENT
Start: 2020-01-01 | End: 2020-01-01

## 2020-01-01 RX ORDER — ACETAMINOPHEN 500 MG
650 TABLET ORAL EVERY 4 HOURS
Refills: 0 | Status: DISCONTINUED | OUTPATIENT
Start: 2020-01-01 | End: 2020-01-01

## 2020-01-01 RX ORDER — DEXAMETHASONE 0.5 MG/5ML
10 ELIXIR ORAL ONCE
Refills: 0 | Status: COMPLETED | OUTPATIENT
Start: 2020-01-01 | End: 2020-01-01

## 2020-01-01 RX ORDER — INSULIN LISPRO 100/ML
VIAL (ML) SUBCUTANEOUS AT BEDTIME
Refills: 0 | Status: DISCONTINUED | OUTPATIENT
Start: 2020-01-01 | End: 2020-01-01

## 2020-01-01 RX ORDER — AMIODARONE HYDROCHLORIDE 400 MG/1
0.5 TABLET ORAL
Qty: 900 | Refills: 0 | Status: DISCONTINUED | OUTPATIENT
Start: 2020-01-01 | End: 2020-01-01

## 2020-01-01 RX ORDER — AMIODARONE HYDROCHLORIDE 400 MG/1
150 TABLET ORAL ONCE
Refills: 0 | Status: COMPLETED | OUTPATIENT
Start: 2020-01-01 | End: 2020-01-01

## 2020-01-01 RX ORDER — INSULIN HUMAN 100 [IU]/ML
5 INJECTION, SOLUTION SUBCUTANEOUS ONCE
Refills: 0 | Status: COMPLETED | OUTPATIENT
Start: 2020-01-01 | End: 2020-01-01

## 2020-01-01 RX ORDER — CHLORHEXIDINE GLUCONATE 213 G/1000ML
1 SOLUTION TOPICAL
Refills: 0 | Status: DISCONTINUED | OUTPATIENT
Start: 2020-01-01 | End: 2020-01-01

## 2020-01-01 RX ORDER — ALBUTEROL 90 UG/1
2 AEROSOL, METERED ORAL EVERY 4 HOURS
Refills: 0 | Status: DISCONTINUED | OUTPATIENT
Start: 2020-01-01 | End: 2020-01-01

## 2020-01-01 RX ORDER — MORPHINE SULFATE 50 MG/1
1 CAPSULE, EXTENDED RELEASE ORAL EVERY 4 HOURS
Refills: 0 | Status: DISCONTINUED | OUTPATIENT
Start: 2020-01-01 | End: 2020-01-01

## 2020-01-01 RX ORDER — INSULIN LISPRO 100/ML
10 VIAL (ML) SUBCUTANEOUS
Refills: 0 | Status: DISCONTINUED | OUTPATIENT
Start: 2020-01-01 | End: 2020-01-01

## 2020-01-01 RX ORDER — LISINOPRIL 2.5 MG/1
5 TABLET ORAL DAILY
Refills: 0 | Status: DISCONTINUED | OUTPATIENT
Start: 2020-01-01 | End: 2020-01-01

## 2020-01-01 RX ORDER — ASPIRIN/CALCIUM CARB/MAGNESIUM 324 MG
325 TABLET ORAL DAILY
Refills: 0 | Status: DISCONTINUED | OUTPATIENT
Start: 2020-01-01 | End: 2020-01-01

## 2020-01-01 RX ORDER — EPINEPHRINE 0.3 MG/.3ML
1 INJECTION INTRAMUSCULAR; SUBCUTANEOUS ONCE
Refills: 0 | Status: COMPLETED | OUTPATIENT
Start: 2020-01-01 | End: 2020-01-01

## 2020-01-01 RX ORDER — DEXTROSE 50 % IN WATER 50 %
25 SYRINGE (ML) INTRAVENOUS ONCE
Refills: 0 | Status: COMPLETED | OUTPATIENT
Start: 2020-01-01 | End: 2020-01-01

## 2020-01-01 RX ORDER — METFORMIN HYDROCHLORIDE 850 MG/1
0 TABLET ORAL
Qty: 60 | Refills: 0 | DISCHARGE

## 2020-01-01 RX ORDER — HEPARIN SODIUM 5000 [USP'U]/ML
300 INJECTION INTRAVENOUS; SUBCUTANEOUS
Qty: 10000 | Refills: 0 | Status: DISCONTINUED | OUTPATIENT
Start: 2020-01-01 | End: 2020-01-01

## 2020-01-01 RX ORDER — SODIUM CHLORIDE 9 MG/ML
1000 INJECTION INTRAMUSCULAR; INTRAVENOUS; SUBCUTANEOUS ONCE
Refills: 0 | Status: COMPLETED | OUTPATIENT
Start: 2020-01-01 | End: 2020-01-01

## 2020-01-01 RX ORDER — INSULIN GLARGINE 100 [IU]/ML
27 INJECTION, SOLUTION SUBCUTANEOUS AT BEDTIME
Refills: 0 | Status: DISCONTINUED | OUTPATIENT
Start: 2020-01-01 | End: 2020-01-01

## 2020-01-01 RX ORDER — PIPERACILLIN AND TAZOBACTAM 4; .5 G/20ML; G/20ML
3.38 INJECTION, POWDER, LYOPHILIZED, FOR SOLUTION INTRAVENOUS ONCE
Refills: 0 | Status: COMPLETED | OUTPATIENT
Start: 2020-01-01 | End: 2020-01-01

## 2020-01-01 RX ORDER — FENTANYL CITRATE 50 UG/ML
50 INJECTION INTRAVENOUS ONCE
Refills: 0 | Status: DISCONTINUED | OUTPATIENT
Start: 2020-01-01 | End: 2020-01-01

## 2020-01-01 RX ORDER — AMOXICILLIN 250 MG/5ML
1 SUSPENSION, RECONSTITUTED, ORAL (ML) ORAL
Qty: 14 | Refills: 0
Start: 2020-01-01 | End: 2020-01-01

## 2020-01-01 RX ORDER — LANOLIN ALCOHOL/MO/W.PET/CERES
3 CREAM (GRAM) TOPICAL ONCE
Refills: 0 | Status: COMPLETED | OUTPATIENT
Start: 2020-01-01 | End: 2020-01-01

## 2020-01-01 RX ORDER — DEXAMETHASONE 0.5 MG/5ML
10 ELIXIR ORAL DAILY
Refills: 0 | Status: DISCONTINUED | OUTPATIENT
Start: 2020-01-01 | End: 2020-01-01

## 2020-01-01 RX ORDER — DEXAMETHASONE 0.5 MG/5ML
6 ELIXIR ORAL DAILY
Refills: 0 | Status: DISCONTINUED | OUTPATIENT
Start: 2020-01-01 | End: 2020-01-01

## 2020-01-01 RX ORDER — INSULIN LISPRO 100/ML
7 VIAL (ML) SUBCUTANEOUS
Refills: 0 | Status: DISCONTINUED | OUTPATIENT
Start: 2020-01-01 | End: 2020-01-01

## 2020-01-01 RX ORDER — REMDESIVIR 5 MG/ML
100 INJECTION INTRAVENOUS EVERY 24 HOURS
Refills: 0 | Status: DISCONTINUED | OUTPATIENT
Start: 2020-01-01 | End: 2020-01-01

## 2020-01-01 RX ORDER — INSULIN LISPRO 100/ML
9 VIAL (ML) SUBCUTANEOUS
Refills: 0 | Status: DISCONTINUED | OUTPATIENT
Start: 2020-01-01 | End: 2020-01-01

## 2020-01-01 RX ORDER — ROCURONIUM BROMIDE 10 MG/ML
100 VIAL (ML) INTRAVENOUS ONCE
Refills: 0 | Status: COMPLETED | OUTPATIENT
Start: 2020-01-01 | End: 2020-01-01

## 2020-01-01 RX ORDER — LISINOPRIL 2.5 MG/1
0 TABLET ORAL
Qty: 30 | Refills: 0 | DISCHARGE

## 2020-01-01 RX ORDER — ZINC SULFATE TAB 220 MG (50 MG ZINC EQUIVALENT) 220 (50 ZN) MG
220 TAB ORAL DAILY
Refills: 0 | Status: DISCONTINUED | OUTPATIENT
Start: 2020-01-01 | End: 2020-01-01

## 2020-01-01 RX ORDER — CALCIUM GLUCONATE 100 MG/ML
2 VIAL (ML) INTRAVENOUS ONCE
Refills: 0 | Status: COMPLETED | OUTPATIENT
Start: 2020-01-01 | End: 2020-01-01

## 2020-01-01 RX ORDER — INSULIN LISPRO 100/ML
6 VIAL (ML) SUBCUTANEOUS
Refills: 0 | Status: DISCONTINUED | OUTPATIENT
Start: 2020-01-01 | End: 2020-01-01

## 2020-01-01 RX ORDER — MIDAZOLAM HYDROCHLORIDE 1 MG/ML
0.06 INJECTION, SOLUTION INTRAMUSCULAR; INTRAVENOUS
Qty: 100 | Refills: 0 | Status: DISCONTINUED | OUTPATIENT
Start: 2020-01-01 | End: 2020-01-01

## 2020-01-01 RX ADMIN — Medication 4: at 21:28

## 2020-01-01 RX ADMIN — INSULIN GLARGINE 30 UNIT(S): 100 INJECTION, SOLUTION SUBCUTANEOUS at 22:03

## 2020-01-01 RX ADMIN — Medication 100 MILLIGRAM(S): at 12:48

## 2020-01-01 RX ADMIN — Medication 4: at 08:21

## 2020-01-01 RX ADMIN — SODIUM CHLORIDE 1000 MILLILITER(S): 9 INJECTION INTRAMUSCULAR; INTRAVENOUS; SUBCUTANEOUS at 03:00

## 2020-01-01 RX ADMIN — Medication 4 UNIT(S): at 12:06

## 2020-01-01 RX ADMIN — DEXMEDETOMIDINE HYDROCHLORIDE IN 0.9% SODIUM CHLORIDE 6.33 MICROGRAM(S)/KG/HR: 4 INJECTION INTRAVENOUS at 06:47

## 2020-01-01 RX ADMIN — REMDESIVIR 500 MILLIGRAM(S): 5 INJECTION INTRAVENOUS at 13:46

## 2020-01-01 RX ADMIN — Medication 7 UNIT(S): at 11:37

## 2020-01-01 RX ADMIN — Medication 7 UNIT(S): at 09:18

## 2020-01-01 RX ADMIN — LISINOPRIL 5 MILLIGRAM(S): 2.5 TABLET ORAL at 05:11

## 2020-01-01 RX ADMIN — Medication 60 MILLIGRAM(S): at 12:32

## 2020-01-01 RX ADMIN — Medication 102 MILLIGRAM(S): at 00:09

## 2020-01-01 RX ADMIN — ZINC SULFATE TAB 220 MG (50 MG ZINC EQUIVALENT) 220 MILLIGRAM(S): 220 (50 ZN) TAB at 12:47

## 2020-01-01 RX ADMIN — Medication 100 MILLIEQUIVALENT(S): at 17:22

## 2020-01-01 RX ADMIN — Medication 4: at 06:50

## 2020-01-01 RX ADMIN — Medication 100 MILLIGRAM(S): at 11:25

## 2020-01-01 RX ADMIN — LISINOPRIL 5 MILLIGRAM(S): 2.5 TABLET ORAL at 05:42

## 2020-01-01 RX ADMIN — ENOXAPARIN SODIUM 40 MILLIGRAM(S): 100 INJECTION SUBCUTANEOUS at 05:21

## 2020-01-01 RX ADMIN — INSULIN GLARGINE 25 UNIT(S): 100 INJECTION, SOLUTION SUBCUTANEOUS at 21:23

## 2020-01-01 RX ADMIN — CHLORHEXIDINE GLUCONATE 15 MILLILITER(S): 213 SOLUTION TOPICAL at 05:11

## 2020-01-01 RX ADMIN — Medication 325 MILLIGRAM(S): at 11:25

## 2020-01-01 RX ADMIN — EPINEPHRINE 63.3 MICROGRAM(S)/KG/MIN: 0.3 INJECTION INTRAMUSCULAR; SUBCUTANEOUS at 06:25

## 2020-01-01 RX ADMIN — VASOPRESSIN 2.4 UNIT(S)/MIN: 20 INJECTION INTRAVENOUS at 05:14

## 2020-01-01 RX ADMIN — ENOXAPARIN SODIUM 40 MILLIGRAM(S): 100 INJECTION SUBCUTANEOUS at 17:18

## 2020-01-01 RX ADMIN — Medication 100 MILLIGRAM(S): at 12:41

## 2020-01-01 RX ADMIN — Medication 4: at 11:09

## 2020-01-01 RX ADMIN — Medication 5: at 17:20

## 2020-01-01 RX ADMIN — LISINOPRIL 5 MILLIGRAM(S): 2.5 TABLET ORAL at 05:46

## 2020-01-01 RX ADMIN — REMDESIVIR 500 MILLIGRAM(S): 5 INJECTION INTRAVENOUS at 16:46

## 2020-01-01 RX ADMIN — ENOXAPARIN SODIUM 40 MILLIGRAM(S): 100 INJECTION SUBCUTANEOUS at 06:12

## 2020-01-01 RX ADMIN — Medication 5: at 12:06

## 2020-01-01 RX ADMIN — ENOXAPARIN SODIUM 40 MILLIGRAM(S): 100 INJECTION SUBCUTANEOUS at 16:45

## 2020-01-01 RX ADMIN — Medication 4: at 16:21

## 2020-01-01 RX ADMIN — ZINC SULFATE TAB 220 MG (50 MG ZINC EQUIVALENT) 220 MILLIGRAM(S): 220 (50 ZN) TAB at 12:41

## 2020-01-01 RX ADMIN — DEXMEDETOMIDINE HYDROCHLORIDE IN 0.9% SODIUM CHLORIDE 6.33 MICROGRAM(S)/KG/HR: 4 INJECTION INTRAVENOUS at 00:58

## 2020-01-01 RX ADMIN — Medication 1500 MILLIGRAM(S): at 12:47

## 2020-01-01 RX ADMIN — Medication 100 MILLIGRAM(S): at 11:55

## 2020-01-01 RX ADMIN — Medication 7 UNIT(S): at 17:19

## 2020-01-01 RX ADMIN — Medication 60 MILLIGRAM(S): at 05:07

## 2020-01-01 RX ADMIN — Medication 2: at 12:37

## 2020-01-01 RX ADMIN — ZINC SULFATE TAB 220 MG (50 MG ZINC EQUIVALENT) 220 MILLIGRAM(S): 220 (50 ZN) TAB at 11:54

## 2020-01-01 RX ADMIN — Medication 7 UNIT(S): at 12:09

## 2020-01-01 RX ADMIN — Medication 4: at 17:26

## 2020-01-01 RX ADMIN — Medication 2: at 21:07

## 2020-01-01 RX ADMIN — LISINOPRIL 10 MILLIGRAM(S): 2.5 TABLET ORAL at 07:13

## 2020-01-01 RX ADMIN — Medication 40 MILLIGRAM(S): at 09:26

## 2020-01-01 RX ADMIN — Medication 7 UNIT(S): at 08:53

## 2020-01-01 RX ADMIN — Medication 2: at 08:06

## 2020-01-01 RX ADMIN — Medication 60 MILLIGRAM(S): at 05:27

## 2020-01-01 RX ADMIN — DEXMEDETOMIDINE HYDROCHLORIDE IN 0.9% SODIUM CHLORIDE 6.33 MICROGRAM(S)/KG/HR: 4 INJECTION INTRAVENOUS at 12:28

## 2020-01-01 RX ADMIN — AMIODARONE HYDROCHLORIDE 16.7 MG/MIN: 400 TABLET ORAL at 14:25

## 2020-01-01 RX ADMIN — Medication 60 MILLIGRAM(S): at 21:13

## 2020-01-01 RX ADMIN — Medication 1500 MILLIGRAM(S): at 11:54

## 2020-01-01 RX ADMIN — REMDESIVIR 500 MILLIGRAM(S): 5 INJECTION INTRAVENOUS at 13:36

## 2020-01-01 RX ADMIN — ZINC SULFATE TAB 220 MG (50 MG ZINC EQUIVALENT) 220 MILLIGRAM(S): 220 (50 ZN) TAB at 14:32

## 2020-01-01 RX ADMIN — Medication 25 GRAM(S): at 06:04

## 2020-01-01 RX ADMIN — Medication 7 UNIT(S): at 17:26

## 2020-01-01 RX ADMIN — REMDESIVIR 500 MILLIGRAM(S): 5 INJECTION INTRAVENOUS at 15:08

## 2020-01-01 RX ADMIN — ENOXAPARIN SODIUM 40 MILLIGRAM(S): 100 INJECTION SUBCUTANEOUS at 05:20

## 2020-01-01 RX ADMIN — Medication 1500 MILLIGRAM(S): at 11:25

## 2020-01-01 RX ADMIN — Medication 1500 MILLIGRAM(S): at 10:36

## 2020-01-01 RX ADMIN — PIPERACILLIN AND TAZOBACTAM 25 GRAM(S): 4; .5 INJECTION, POWDER, LYOPHILIZED, FOR SOLUTION INTRAVENOUS at 05:30

## 2020-01-01 RX ADMIN — ENOXAPARIN SODIUM 40 MILLIGRAM(S): 100 INJECTION SUBCUTANEOUS at 18:16

## 2020-01-01 RX ADMIN — Medication 50 MILLIEQUIVALENT(S): at 04:10

## 2020-01-01 RX ADMIN — Medication 2: at 08:12

## 2020-01-01 RX ADMIN — MORPHINE SULFATE 1 MILLIGRAM(S): 50 CAPSULE, EXTENDED RELEASE ORAL at 16:13

## 2020-01-01 RX ADMIN — SODIUM CHLORIDE 2000 MILLILITER(S): 9 INJECTION, SOLUTION INTRAVENOUS at 08:54

## 2020-01-01 RX ADMIN — Medication 3.96 MICROGRAM(S)/KG/MIN: at 23:16

## 2020-01-01 RX ADMIN — ENOXAPARIN SODIUM 40 MILLIGRAM(S): 100 INJECTION SUBCUTANEOUS at 05:42

## 2020-01-01 RX ADMIN — INSULIN GLARGINE 30 UNIT(S): 100 INJECTION, SOLUTION SUBCUTANEOUS at 21:33

## 2020-01-01 RX ADMIN — Medication 100 MILLIGRAM(S): at 12:02

## 2020-01-01 RX ADMIN — Medication 40 MILLIGRAM(S): at 18:45

## 2020-01-01 RX ADMIN — ZINC SULFATE TAB 220 MG (50 MG ZINC EQUIVALENT) 220 MILLIGRAM(S): 220 (50 ZN) TAB at 11:26

## 2020-01-01 RX ADMIN — EPINEPHRINE 63.3 MICROGRAM(S)/KG/MIN: 0.3 INJECTION INTRAMUSCULAR; SUBCUTANEOUS at 16:46

## 2020-01-01 RX ADMIN — ENOXAPARIN SODIUM 40 MILLIGRAM(S): 100 INJECTION SUBCUTANEOUS at 05:17

## 2020-01-01 RX ADMIN — Medication 3: at 08:33

## 2020-01-01 RX ADMIN — Medication 60 MILLIGRAM(S): at 13:45

## 2020-01-01 RX ADMIN — Medication 3 MILLIGRAM(S): at 22:19

## 2020-01-01 RX ADMIN — Medication 102 MILLIGRAM(S): at 06:12

## 2020-01-01 RX ADMIN — FENTANYL CITRATE 50 MICROGRAM(S): 50 INJECTION INTRAVENOUS at 08:54

## 2020-01-01 RX ADMIN — Medication 100 MILLIGRAM(S): at 17:21

## 2020-01-01 RX ADMIN — INSULIN GLARGINE 25 UNIT(S): 100 INJECTION, SOLUTION SUBCUTANEOUS at 21:12

## 2020-01-01 RX ADMIN — Medication 7 UNIT(S): at 08:25

## 2020-01-01 RX ADMIN — REMDESIVIR 500 MILLIGRAM(S): 5 INJECTION INTRAVENOUS at 15:18

## 2020-01-01 RX ADMIN — Medication 3: at 21:12

## 2020-01-01 RX ADMIN — Medication 2: at 12:09

## 2020-01-01 RX ADMIN — Medication 2: at 21:35

## 2020-01-01 RX ADMIN — Medication 2: at 16:13

## 2020-01-01 RX ADMIN — Medication 2: at 09:18

## 2020-01-01 RX ADMIN — Medication 325 MILLIGRAM(S): at 17:26

## 2020-01-01 RX ADMIN — Medication 3.96 MICROGRAM(S)/KG/MIN: at 05:13

## 2020-01-01 RX ADMIN — Medication 100 MILLIGRAM(S): at 11:37

## 2020-01-01 RX ADMIN — Medication 60 MILLIGRAM(S): at 18:16

## 2020-01-01 RX ADMIN — ZINC SULFATE TAB 220 MG (50 MG ZINC EQUIVALENT) 220 MILLIGRAM(S): 220 (50 ZN) TAB at 09:20

## 2020-01-01 RX ADMIN — Medication 3.96 MICROGRAM(S)/KG/MIN: at 02:32

## 2020-01-01 RX ADMIN — Medication 1500 MILLIGRAM(S): at 17:26

## 2020-01-01 RX ADMIN — Medication 2: at 16:09

## 2020-01-01 RX ADMIN — REMDESIVIR 500 MILLIGRAM(S): 5 INJECTION INTRAVENOUS at 14:53

## 2020-01-01 RX ADMIN — ENOXAPARIN SODIUM 40 MILLIGRAM(S): 100 INJECTION SUBCUTANEOUS at 17:56

## 2020-01-01 RX ADMIN — Medication 7 UNIT(S): at 16:45

## 2020-01-01 RX ADMIN — AMIODARONE HYDROCHLORIDE 33.3 MG/MIN: 400 TABLET ORAL at 05:12

## 2020-01-01 RX ADMIN — CHLORHEXIDINE GLUCONATE 1 APPLICATION(S): 213 SOLUTION TOPICAL at 14:01

## 2020-01-01 RX ADMIN — Medication 60 MILLIGRAM(S): at 05:42

## 2020-01-01 RX ADMIN — INSULIN GLARGINE 27 UNIT(S): 100 INJECTION, SOLUTION SUBCUTANEOUS at 21:18

## 2020-01-01 RX ADMIN — HEPARIN SODIUM 5000 UNIT(S): 5000 INJECTION INTRAVENOUS; SUBCUTANEOUS at 05:29

## 2020-01-01 RX ADMIN — ENOXAPARIN SODIUM 40 MILLIGRAM(S): 100 INJECTION SUBCUTANEOUS at 05:24

## 2020-01-01 RX ADMIN — Medication 3.96 MICROGRAM(S)/KG/MIN: at 12:28

## 2020-01-01 RX ADMIN — ZINC SULFATE TAB 220 MG (50 MG ZINC EQUIVALENT) 220 MILLIGRAM(S): 220 (50 ZN) TAB at 12:32

## 2020-01-01 RX ADMIN — Medication 1500 MILLIGRAM(S): at 12:09

## 2020-01-01 RX ADMIN — ENOXAPARIN SODIUM 40 MILLIGRAM(S): 100 INJECTION SUBCUTANEOUS at 05:10

## 2020-01-01 RX ADMIN — Medication 4: at 16:45

## 2020-01-01 RX ADMIN — DEXMEDETOMIDINE HYDROCHLORIDE IN 0.9% SODIUM CHLORIDE 6.33 MICROGRAM(S)/KG/HR: 4 INJECTION INTRAVENOUS at 21:19

## 2020-01-01 RX ADMIN — Medication 1500 MILLIGRAM(S): at 12:41

## 2020-01-01 RX ADMIN — CHLORHEXIDINE GLUCONATE 15 MILLILITER(S): 213 SOLUTION TOPICAL at 06:05

## 2020-01-01 RX ADMIN — ENOXAPARIN SODIUM 40 MILLIGRAM(S): 100 INJECTION SUBCUTANEOUS at 05:22

## 2020-01-01 RX ADMIN — EPINEPHRINE 1 MILLIGRAM(S): 0.3 INJECTION INTRAMUSCULAR; SUBCUTANEOUS at 05:11

## 2020-01-01 RX ADMIN — SODIUM CHLORIDE 125 MILLILITER(S): 9 INJECTION, SOLUTION INTRAVENOUS at 12:29

## 2020-01-01 RX ADMIN — Medication 4: at 17:39

## 2020-01-01 RX ADMIN — MORPHINE SULFATE 1 MILLIGRAM(S): 50 CAPSULE, EXTENDED RELEASE ORAL at 17:38

## 2020-01-01 RX ADMIN — SODIUM CHLORIDE 125 MILLILITER(S): 9 INJECTION, SOLUTION INTRAVENOUS at 07:14

## 2020-01-01 RX ADMIN — Medication 2: at 12:46

## 2020-01-01 RX ADMIN — MORPHINE SULFATE 1 MILLIGRAM(S): 50 CAPSULE, EXTENDED RELEASE ORAL at 16:09

## 2020-01-01 RX ADMIN — Medication 4: at 21:36

## 2020-01-01 RX ADMIN — SODIUM CHLORIDE 125 MILLILITER(S): 9 INJECTION, SOLUTION INTRAVENOUS at 21:32

## 2020-01-01 RX ADMIN — Medication 7 UNIT(S): at 12:30

## 2020-01-01 RX ADMIN — Medication 2: at 08:19

## 2020-01-01 RX ADMIN — ZINC SULFATE TAB 220 MG (50 MG ZINC EQUIVALENT) 220 MILLIGRAM(S): 220 (50 ZN) TAB at 11:25

## 2020-01-01 RX ADMIN — Medication 7 UNIT(S): at 12:46

## 2020-01-01 RX ADMIN — Medication 2: at 08:33

## 2020-01-01 RX ADMIN — Medication 3: at 21:33

## 2020-01-01 RX ADMIN — EPINEPHRINE 1 MILLIGRAM(S): 0.3 INJECTION INTRAMUSCULAR; SUBCUTANEOUS at 03:00

## 2020-01-01 RX ADMIN — Medication 4 UNIT(S): at 17:56

## 2020-01-01 RX ADMIN — PIPERACILLIN AND TAZOBACTAM 200 GRAM(S): 4; .5 INJECTION, POWDER, LYOPHILIZED, FOR SOLUTION INTRAVENOUS at 22:47

## 2020-01-01 RX ADMIN — MORPHINE SULFATE 2 MILLIGRAM(S): 50 CAPSULE, EXTENDED RELEASE ORAL at 00:47

## 2020-01-01 RX ADMIN — Medication 325 MILLIGRAM(S): at 08:40

## 2020-01-01 RX ADMIN — Medication 60 MILLIGRAM(S): at 21:29

## 2020-01-01 RX ADMIN — Medication 3.96 MICROGRAM(S)/KG/MIN: at 10:17

## 2020-01-01 RX ADMIN — Medication 7 UNIT(S): at 17:36

## 2020-01-01 RX ADMIN — HEPARIN SODIUM 0.6 UNIT(S)/HR: 5000 INJECTION INTRAVENOUS; SUBCUTANEOUS at 14:24

## 2020-01-01 RX ADMIN — HEPARIN SODIUM 5000 UNIT(S): 5000 INJECTION INTRAVENOUS; SUBCUTANEOUS at 21:32

## 2020-01-01 RX ADMIN — Medication 102 MILLIGRAM(S): at 05:39

## 2020-01-01 RX ADMIN — MIDAZOLAM HYDROCHLORIDE 5 MG/KG/HR: 1 INJECTION, SOLUTION INTRAMUSCULAR; INTRAVENOUS at 18:21

## 2020-01-01 RX ADMIN — Medication 60 MILLIGRAM(S): at 05:24

## 2020-01-01 RX ADMIN — LISINOPRIL 10 MILLIGRAM(S): 2.5 TABLET ORAL at 05:17

## 2020-01-01 RX ADMIN — Medication 3: at 21:28

## 2020-01-01 RX ADMIN — INSULIN GLARGINE 25 UNIT(S): 100 INJECTION, SOLUTION SUBCUTANEOUS at 21:07

## 2020-01-01 RX ADMIN — Medication 1500 MILLIGRAM(S): at 10:18

## 2020-01-01 RX ADMIN — Medication 4: at 12:30

## 2020-01-01 RX ADMIN — Medication 4: at 18:16

## 2020-01-01 RX ADMIN — Medication 3: at 08:25

## 2020-01-01 RX ADMIN — Medication 325 MILLIGRAM(S): at 11:26

## 2020-01-01 RX ADMIN — LISINOPRIL 10 MILLIGRAM(S): 2.5 TABLET ORAL at 05:27

## 2020-01-01 RX ADMIN — ENOXAPARIN SODIUM 40 MILLIGRAM(S): 100 INJECTION SUBCUTANEOUS at 17:26

## 2020-01-01 RX ADMIN — Medication 4: at 12:47

## 2020-01-01 RX ADMIN — REMDESIVIR 500 MILLIGRAM(S): 5 INJECTION INTRAVENOUS at 16:56

## 2020-01-01 RX ADMIN — ZINC SULFATE TAB 220 MG (50 MG ZINC EQUIVALENT) 220 MILLIGRAM(S): 220 (50 ZN) TAB at 10:36

## 2020-01-01 RX ADMIN — Medication 100 MILLIGRAM(S): at 12:32

## 2020-01-01 RX ADMIN — Medication 325 MILLIGRAM(S): at 12:32

## 2020-01-01 RX ADMIN — Medication 5 MILLIGRAM(S): at 21:30

## 2020-01-01 RX ADMIN — SODIUM CHLORIDE 125 MILLILITER(S): 9 INJECTION, SOLUTION INTRAVENOUS at 05:15

## 2020-01-01 RX ADMIN — Medication 3: at 08:08

## 2020-01-01 RX ADMIN — Medication 1500 MILLIGRAM(S): at 09:19

## 2020-01-01 RX ADMIN — Medication 60 MILLIGRAM(S): at 05:16

## 2020-01-01 RX ADMIN — Medication 1500 MILLIGRAM(S): at 08:40

## 2020-01-01 RX ADMIN — VASOPRESSIN 2.4 UNIT(S)/MIN: 20 INJECTION INTRAVENOUS at 12:29

## 2020-01-01 RX ADMIN — Medication 60 MILLIGRAM(S): at 13:36

## 2020-01-01 RX ADMIN — Medication 7 UNIT(S): at 12:24

## 2020-01-01 RX ADMIN — ENOXAPARIN SODIUM 40 MILLIGRAM(S): 100 INJECTION SUBCUTANEOUS at 18:01

## 2020-01-01 RX ADMIN — Medication 4: at 11:30

## 2020-01-01 RX ADMIN — Medication 7 UNIT(S): at 11:30

## 2020-01-01 RX ADMIN — CHLORHEXIDINE GLUCONATE 15 MILLILITER(S): 213 SOLUTION TOPICAL at 17:22

## 2020-01-01 RX ADMIN — Medication 60 MILLIGRAM(S): at 05:46

## 2020-01-01 RX ADMIN — Medication 3: at 12:30

## 2020-01-01 RX ADMIN — SODIUM CHLORIDE 500 MILLILITER(S): 9 INJECTION, SOLUTION INTRAVENOUS at 05:14

## 2020-01-01 RX ADMIN — ENOXAPARIN SODIUM 40 MILLIGRAM(S): 100 INJECTION SUBCUTANEOUS at 16:13

## 2020-01-01 RX ADMIN — Medication 2: at 21:16

## 2020-01-01 RX ADMIN — Medication 4: at 22:04

## 2020-01-01 RX ADMIN — EPINEPHRINE 63.3 MICROGRAM(S)/KG/MIN: 0.3 INJECTION INTRAMUSCULAR; SUBCUTANEOUS at 05:13

## 2020-01-01 RX ADMIN — Medication 3: at 11:56

## 2020-01-01 RX ADMIN — ENOXAPARIN SODIUM 40 MILLIGRAM(S): 100 INJECTION SUBCUTANEOUS at 17:08

## 2020-01-01 RX ADMIN — Medication 4: at 17:56

## 2020-01-01 RX ADMIN — Medication 3.96 MICROGRAM(S)/KG/MIN: at 06:29

## 2020-01-01 RX ADMIN — Medication 100 MILLIGRAM(S): at 17:29

## 2020-01-01 RX ADMIN — INSULIN GLARGINE 15 UNIT(S): 100 INJECTION, SOLUTION SUBCUTANEOUS at 21:35

## 2020-01-01 RX ADMIN — Medication 3.96 MICROGRAM(S)/KG/MIN: at 21:33

## 2020-01-01 RX ADMIN — Medication 325 MILLIGRAM(S): at 12:02

## 2020-01-01 RX ADMIN — REMDESIVIR 500 MILLIGRAM(S): 5 INJECTION INTRAVENOUS at 16:17

## 2020-01-01 RX ADMIN — Medication 3.96 MICROGRAM(S)/KG/MIN: at 00:52

## 2020-01-01 RX ADMIN — EPINEPHRINE 63.3 MICROGRAM(S)/KG/MIN: 0.3 INJECTION INTRAMUSCULAR; SUBCUTANEOUS at 05:30

## 2020-01-01 RX ADMIN — Medication 102 MILLIGRAM(S): at 06:04

## 2020-01-01 RX ADMIN — REMDESIVIR 500 MILLIGRAM(S): 5 INJECTION INTRAVENOUS at 14:32

## 2020-01-01 RX ADMIN — ENOXAPARIN SODIUM 40 MILLIGRAM(S): 100 INJECTION SUBCUTANEOUS at 05:27

## 2020-01-01 RX ADMIN — Medication 3: at 16:56

## 2020-01-01 RX ADMIN — INSULIN GLARGINE 25 UNIT(S): 100 INJECTION, SOLUTION SUBCUTANEOUS at 21:33

## 2020-01-01 RX ADMIN — REMDESIVIR 500 MILLIGRAM(S): 5 INJECTION INTRAVENOUS at 18:15

## 2020-01-01 RX ADMIN — Medication 7 UNIT(S): at 08:33

## 2020-01-01 RX ADMIN — Medication 1500 MILLIGRAM(S): at 12:02

## 2020-01-01 RX ADMIN — Medication 1500 MILLIGRAM(S): at 11:36

## 2020-01-01 RX ADMIN — Medication 325 MILLIGRAM(S): at 10:36

## 2020-01-01 RX ADMIN — ZINC SULFATE TAB 220 MG (50 MG ZINC EQUIVALENT) 220 MILLIGRAM(S): 220 (50 ZN) TAB at 12:45

## 2020-01-01 RX ADMIN — Medication 3: at 11:37

## 2020-01-01 RX ADMIN — EPINEPHRINE 63.3 MICROGRAM(S)/KG/MIN: 0.3 INJECTION INTRAMUSCULAR; SUBCUTANEOUS at 12:29

## 2020-01-01 RX ADMIN — ENOXAPARIN SODIUM 40 MILLIGRAM(S): 100 INJECTION SUBCUTANEOUS at 05:07

## 2020-01-01 RX ADMIN — EPINEPHRINE 63.3 MICROGRAM(S)/KG/MIN: 0.3 INJECTION INTRAMUSCULAR; SUBCUTANEOUS at 14:23

## 2020-01-01 RX ADMIN — Medication 60 MILLIGRAM(S): at 16:44

## 2020-01-01 RX ADMIN — Medication 60 MILLIGRAM(S): at 21:27

## 2020-01-01 RX ADMIN — Medication 4: at 17:21

## 2020-01-01 RX ADMIN — INSULIN GLARGINE 25 UNIT(S): 100 INJECTION, SOLUTION SUBCUTANEOUS at 21:28

## 2020-01-01 RX ADMIN — Medication 100 MILLIGRAM(S): at 11:26

## 2020-01-01 RX ADMIN — CHLORHEXIDINE GLUCONATE 1 APPLICATION(S): 213 SOLUTION TOPICAL at 06:12

## 2020-01-01 RX ADMIN — Medication 325 MILLIGRAM(S): at 11:37

## 2020-01-01 RX ADMIN — Medication 60 MILLIGRAM(S): at 14:53

## 2020-01-01 RX ADMIN — Medication 60 MILLIGRAM(S): at 17:36

## 2020-01-01 RX ADMIN — Medication 50 MILLIEQUIVALENT(S): at 04:00

## 2020-01-01 RX ADMIN — Medication 60 MILLIGRAM(S): at 17:26

## 2020-01-01 RX ADMIN — MORPHINE SULFATE 1 MILLIGRAM(S): 50 CAPSULE, EXTENDED RELEASE ORAL at 13:28

## 2020-01-01 RX ADMIN — ENOXAPARIN SODIUM 40 MILLIGRAM(S): 100 INJECTION SUBCUTANEOUS at 05:45

## 2020-01-01 RX ADMIN — ENOXAPARIN SODIUM 40 MILLIGRAM(S): 100 INJECTION SUBCUTANEOUS at 05:30

## 2020-01-01 RX ADMIN — Medication 4: at 12:18

## 2020-01-01 RX ADMIN — Medication 6 MILLIGRAM(S): at 05:20

## 2020-01-01 RX ADMIN — HEPARIN SODIUM 5000 UNIT(S): 5000 INJECTION INTRAVENOUS; SUBCUTANEOUS at 14:42

## 2020-01-01 RX ADMIN — Medication 7 UNIT(S): at 08:08

## 2020-01-01 RX ADMIN — EPINEPHRINE 63.3 MICROGRAM(S)/KG/MIN: 0.3 INJECTION INTRAMUSCULAR; SUBCUTANEOUS at 00:58

## 2020-01-01 RX ADMIN — Medication 7 UNIT(S): at 08:37

## 2020-01-01 RX ADMIN — Medication 7 UNIT(S): at 08:07

## 2020-01-01 RX ADMIN — Medication 6 MILLIGRAM(S): at 05:21

## 2020-01-01 RX ADMIN — Medication 12.5 GRAM(S): at 11:33

## 2020-01-01 RX ADMIN — Medication 100 MILLIGRAM(S): at 12:46

## 2020-01-01 RX ADMIN — VASOPRESSIN 2.4 UNIT(S)/MIN: 20 INJECTION INTRAVENOUS at 14:23

## 2020-01-01 RX ADMIN — MIDAZOLAM HYDROCHLORIDE 5 MG/KG/HR: 1 INJECTION, SOLUTION INTRAMUSCULAR; INTRAVENOUS at 10:17

## 2020-01-01 RX ADMIN — Medication 100 MILLIGRAM(S): at 09:20

## 2020-01-01 RX ADMIN — ENOXAPARIN SODIUM 40 MILLIGRAM(S): 100 INJECTION SUBCUTANEOUS at 17:15

## 2020-01-01 RX ADMIN — Medication 100 MILLIGRAM(S): at 12:09

## 2020-01-01 RX ADMIN — DEXMEDETOMIDINE HYDROCHLORIDE IN 0.9% SODIUM CHLORIDE 6.33 MICROGRAM(S)/KG/HR: 4 INJECTION INTRAVENOUS at 10:18

## 2020-01-01 RX ADMIN — Medication 4 UNIT(S): at 08:21

## 2020-01-01 RX ADMIN — EPINEPHRINE 63.3 MICROGRAM(S)/KG/MIN: 0.3 INJECTION INTRAMUSCULAR; SUBCUTANEOUS at 02:57

## 2020-01-01 RX ADMIN — CHLORHEXIDINE GLUCONATE 1 APPLICATION(S): 213 SOLUTION TOPICAL at 10:24

## 2020-01-01 RX ADMIN — ZINC SULFATE TAB 220 MG (50 MG ZINC EQUIVALENT) 220 MILLIGRAM(S): 220 (50 ZN) TAB at 11:37

## 2020-01-01 RX ADMIN — Medication 200 GRAM(S): at 13:30

## 2020-01-01 RX ADMIN — Medication 60 MILLIGRAM(S): at 05:02

## 2020-01-01 RX ADMIN — Medication 60 MILLIGRAM(S): at 05:11

## 2020-01-01 RX ADMIN — Medication 7 UNIT(S): at 18:15

## 2020-01-01 RX ADMIN — Medication 7 UNIT(S): at 16:56

## 2020-01-01 RX ADMIN — Medication 7 UNIT(S): at 12:21

## 2020-01-01 RX ADMIN — LISINOPRIL 10 MILLIGRAM(S): 2.5 TABLET ORAL at 05:20

## 2020-01-01 RX ADMIN — Medication 7 UNIT(S): at 17:51

## 2020-01-01 RX ADMIN — Medication 100 MILLIEQUIVALENT(S): at 08:14

## 2020-01-01 RX ADMIN — Medication 325 MILLIGRAM(S): at 12:09

## 2020-01-01 RX ADMIN — ENOXAPARIN SODIUM 40 MILLIGRAM(S): 100 INJECTION SUBCUTANEOUS at 17:36

## 2020-01-01 RX ADMIN — ZINC SULFATE TAB 220 MG (50 MG ZINC EQUIVALENT) 220 MILLIGRAM(S): 220 (50 ZN) TAB at 12:09

## 2020-01-01 RX ADMIN — Medication 2: at 08:53

## 2020-01-01 RX ADMIN — ZINC SULFATE TAB 220 MG (50 MG ZINC EQUIVALENT) 220 MILLIGRAM(S): 220 (50 ZN) TAB at 08:40

## 2020-01-01 RX ADMIN — Medication 125 MILLIGRAM(S): at 11:55

## 2020-01-01 RX ADMIN — Medication 102 MILLIGRAM(S): at 22:08

## 2020-01-01 RX ADMIN — INSULIN HUMAN 5 UNIT(S): 100 INJECTION, SOLUTION SUBCUTANEOUS at 01:59

## 2020-01-01 RX ADMIN — Medication 7 UNIT(S): at 17:21

## 2020-01-01 RX ADMIN — EPINEPHRINE 63.3 MICROGRAM(S)/KG/MIN: 0.3 INJECTION INTRAMUSCULAR; SUBCUTANEOUS at 10:17

## 2020-01-01 RX ADMIN — Medication 60 MILLIGRAM(S): at 21:30

## 2020-01-01 RX ADMIN — Medication 7 UNIT(S): at 08:20

## 2020-01-01 RX ADMIN — Medication 325 MILLIGRAM(S): at 12:47

## 2020-01-01 RX ADMIN — Medication 102 MILLIGRAM(S): at 05:21

## 2020-01-01 RX ADMIN — Medication 325 MILLIGRAM(S): at 09:20

## 2020-01-01 RX ADMIN — Medication 325 MILLIGRAM(S): at 11:55

## 2020-01-01 RX ADMIN — INSULIN GLARGINE 25 UNIT(S): 100 INJECTION, SOLUTION SUBCUTANEOUS at 21:29

## 2020-01-01 RX ADMIN — EPINEPHRINE 63.3 MICROGRAM(S)/KG/MIN: 0.3 INJECTION INTRAMUSCULAR; SUBCUTANEOUS at 23:17

## 2020-01-01 RX ADMIN — ZINC SULFATE TAB 220 MG (50 MG ZINC EQUIVALENT) 220 MILLIGRAM(S): 220 (50 ZN) TAB at 17:29

## 2020-01-01 RX ADMIN — Medication 60 MILLIGRAM(S): at 17:28

## 2020-01-01 RX ADMIN — ENOXAPARIN SODIUM 40 MILLIGRAM(S): 100 INJECTION SUBCUTANEOUS at 17:21

## 2020-01-01 RX ADMIN — Medication 100 MILLIGRAM(S): at 08:40

## 2020-01-01 RX ADMIN — Medication 1500 MILLIGRAM(S): at 12:31

## 2020-01-01 RX ADMIN — EPINEPHRINE 63.3 MICROGRAM(S)/KG/MIN: 0.3 INJECTION INTRAMUSCULAR; SUBCUTANEOUS at 22:23

## 2020-01-01 RX ADMIN — ENOXAPARIN SODIUM 40 MILLIGRAM(S): 100 INJECTION SUBCUTANEOUS at 05:02

## 2020-01-01 RX ADMIN — ENOXAPARIN SODIUM 40 MILLIGRAM(S): 100 INJECTION SUBCUTANEOUS at 05:39

## 2020-01-01 RX ADMIN — Medication 7 UNIT(S): at 17:40

## 2020-01-01 RX ADMIN — KETAMINE HYDROCHLORIDE 2.11 MG/KG/HR: 100 INJECTION INTRAMUSCULAR; INTRAVENOUS at 08:35

## 2020-01-01 RX ADMIN — MORPHINE SULFATE 1 MILLIGRAM(S): 50 CAPSULE, EXTENDED RELEASE ORAL at 17:37

## 2020-01-01 RX ADMIN — Medication 3: at 17:50

## 2020-01-01 RX ADMIN — MORPHINE SULFATE 1 MILLIGRAM(S): 50 CAPSULE, EXTENDED RELEASE ORAL at 13:26

## 2020-01-01 RX ADMIN — Medication 325 MILLIGRAM(S): at 12:41

## 2020-01-01 RX ADMIN — AMIODARONE HYDROCHLORIDE 16.7 MG/MIN: 400 TABLET ORAL at 12:34

## 2020-01-01 RX ADMIN — INSULIN GLARGINE 30 UNIT(S): 100 INJECTION, SOLUTION SUBCUTANEOUS at 21:16

## 2020-01-01 RX ADMIN — Medication 40 MILLIGRAM(S): at 20:36

## 2020-01-01 RX ADMIN — CHLORHEXIDINE GLUCONATE 1 APPLICATION(S): 213 SOLUTION TOPICAL at 05:30

## 2020-01-01 RX ADMIN — ENOXAPARIN SODIUM 40 MILLIGRAM(S): 100 INJECTION SUBCUTANEOUS at 16:57

## 2020-01-01 RX ADMIN — Medication 4: at 12:24

## 2020-01-01 RX ADMIN — AMIODARONE HYDROCHLORIDE 618 MILLIGRAM(S): 400 TABLET ORAL at 03:00

## 2020-01-01 RX ADMIN — EPINEPHRINE 63.3 MICROGRAM(S)/KG/MIN: 0.3 INJECTION INTRAMUSCULAR; SUBCUTANEOUS at 20:10

## 2020-01-01 RX ADMIN — Medication 7 UNIT(S): at 08:12

## 2020-01-01 RX ADMIN — Medication 100 MILLIGRAM(S): at 10:36

## 2020-01-01 RX ADMIN — Medication 102 MILLIGRAM(S): at 16:13

## 2020-01-01 RX ADMIN — INSULIN GLARGINE 25 UNIT(S): 100 INJECTION, SOLUTION SUBCUTANEOUS at 21:35

## 2020-01-01 RX ADMIN — KETAMINE HYDROCHLORIDE 2.11 MG/KG/HR: 100 INJECTION INTRAMUSCULAR; INTRAVENOUS at 14:22

## 2020-01-01 RX ADMIN — Medication 60 MILLIGRAM(S): at 05:29

## 2020-01-01 RX ADMIN — Medication 3.96 MICROGRAM(S)/KG/MIN: at 20:10

## 2020-01-01 RX ADMIN — LISINOPRIL 10 MILLIGRAM(S): 2.5 TABLET ORAL at 05:21

## 2020-08-08 NOTE — ED PROVIDER NOTE - PATIENT PORTAL LINK FT
You can access the FollowMyHealth Patient Portal offered by [facility] by registering at the following website: [email or web address]. By joining [employer]’s FollowMyHealth portal, you will also be able to view your health information using other applications (apps) compatible with our system.

## 2020-08-08 NOTE — ED PROVIDER NOTE - PHYSICAL EXAMINATION
Constitutional - well-developed; well nourished. Head - NCAT. Airway patent. +oropharyngeal erythema and exudates. Eyes - PERRL. CV - RRR. no murmur. no edema. Pulm - CTAB. Abd - soft, nt. no rebound. no guarding. Neuro - A&Ox3. strength 5/5 x4. sensation intact x4. normal gait. Skin - No rash. MSK - normal ROM.

## 2020-08-08 NOTE — ED PROVIDER NOTE - NS ED ROS FT
+fever no chills, No photophobia/eye pain/changes in vision, No ear pain/dysphagia, No chest pain/palpitations, no SOB/cough/wheeze/stridor, No abdominal pain, No N/V/D, no dysuria/frequency/discharge, No neck/back pain, no rash, no changes in neurological status/function.   +sore throat

## 2020-08-08 NOTE — ED ADULT TRIAGE NOTE - CHIEF COMPLAINT QUOTE
pt c/o fever and itching ears for 3 days, " I called my DR and he told me because im sick I cant come to his office I need to go to the hospital" Pt denies sore throat, denies cough. Took tylenol today at 8 am. Temp 100 at home per patient

## 2020-08-08 NOTE — ED PROVIDER NOTE - OBJECTIVE STATEMENT
Pt is a 51 yo M co fever.  Pt states that for the past 3 d he has had fever (Tmax), nasal congestion and sore throat. Pt states that he has no sick contacts. no n/v. no diarrhea. no abd pain. no cp. no sob. no cough. no other complaints.

## 2020-08-08 NOTE — ED PROVIDER NOTE - CLINICAL SUMMARY MEDICAL DECISION MAKING FREE TEXT BOX
COVID swab sent.  will treat presumptive strep pharyngitis.  Pt reassured and instructed to quarantine until covid results are back. instructed to f/up with pcp in 1-2 days. instructed to return for any new/concerning symptoms.

## 2020-08-08 NOTE — ED PROVIDER NOTE - NSFOLLOWUPINSTRUCTIONS_ED_ALL_ED_FT
JUSTIN TODAS LAS INSTRUCCIONES ADJUNTAS PARA CORONAVIRUS INMEDIATAMENTE   - Se le realizaron pruebas para detectar COVID-19 (Coronavirus) rach collado visita al Departamento de Emergencias.   - No vuelva al trabajo/escuela/áreas públicas/transporte público hasta que haya dado negativo para COVID-19.  - Los resultados estarán disponibles en 5-7 días. Autocuarentena hasta que los resultados de COVID-19 estén disponibles.  - Supervise césar síntomas utilizando el rastreador de síntomas.  - Practicar el distanciamiento social y evitar el contacto con los demás. Evite compartir artículos personales para el hogar.   - Practicar la higiene adecuada de las janak. Desinfectar superficies con frecuencia. Cúbrase la tos y estornude.   - Manténgase hidratado.      BUSCA ATENCIÓN MÉDICA INMEDIATA SI TIENES CUALQUIERA DE LOS SIGUIENTES SÍNTOMAS  **Busca atención médica inmediata si desarrollas nuevos/empeoramiento, signos/síntomas o preocupaciones, incluyendo, vonda no limitado a dificultad para respirar, dificultad para respirar, dolor en el pecho, confusión, debilidad severa.**    Si shruti que está experimentando kalpana emergencia médica llame al 9-1-1.    READ ALL ATTACHED INSTRUCTIONS FOR CORONAVIRUS IMMEDIATELY   - You were tested for COVID-19 (Coronavirus) during your visit to Emergency Department.   - Do not return to work/school/public areas/public transit until you have tested negative for COVID-19.  - Results will be available in 5-7 days. Self quarantine until COVID-19 results available.  - Monitor your symptoms using symptom tracker.  - Practice social distancing and avoid contact with others. Avoid sharing personal household items.   - Practice proper hand hygiene. Disinfect surfaces frequently. Cover your coughs and sneezes.   - Stay hydrated.      SEEK IMMEDIATE MEDICAL CARE IF YOU HAVE ANY OF THE FOLLOWING SYMPTOMS  **Seek immediate medicate attention if you develop new/worsening, signs/symptoms or concerns including, but not limited to shortness of breath, difficulty breathing, chest pain, confusion, severe weakness.**    If you believe you are experiencing a medical emergency call 9-1-1.

## 2020-08-12 NOTE — ED ADULT NURSE NOTE - OBJECTIVE STATEMENT
pt presents to ED + covid c/o increasing SOB and chest pressure since yesterday. Pt hypoxic upon arrival, placed on 6L NC with improvement. Pt slightly tachypneic, so s/s of respiratory distress noted. Sp02 decreasing to 90% on 6L .Dr Bonilla made aware, patient placed in prone position, SP02 now 96%. Pt sleeping, resting comfortably. IV and labs done prior to RN receiving care.

## 2020-08-12 NOTE — ED PROVIDER NOTE - CLINICAL SUMMARY MEDICAL DECISION MAKING FREE TEXT BOX
patient found to be covid positive 8/8, found to be hypoxic, given dexamethasone, will admit for further management

## 2020-08-12 NOTE — ED PROVIDER NOTE - OBJECTIVE STATEMENT
50yoM; with pmh signif for DM, HTN, HLD; now p/w sob. patient reports  having mild fever and cough beginning august 7th, worsening and went to ED and found to have +covid on august 8th.  reports increasing sob and chest pain yesterday. chest pain--sscp, pressure, non-radiating, non-exertional, non-pleuritic, associated with sob and palp. c/o fever and chills.  denies smoking or trauma. reports +covid contacts (wife and daughter).  denies abd pain. denies n/v.  PMH: DM, HTN, HLD  SOCIAL: No tobacco/illicit substance use/EtOH

## 2020-08-12 NOTE — ED ADULT TRIAGE NOTE - CHIEF COMPLAINT QUOTE
pt BIBA from home a&ox3, no acute distress, c/o difficulty breathing. resulted COVID+ on saturday. spo2 86% on RA at home, 94% on 6lpm o2 via nasal cannula. denies chest pain.

## 2020-08-12 NOTE — ED PROVIDER NOTE - NS ED ROS FT
Constitutional: (+) fever  (-)chills  (-)sweats  Eyes/ENT: (-) blurry vision, (-) epistaxis  (-)rhinorrhea   (-) sore throat    Cardiovascular: (-) chest pain, (-) palpitations (-) edema   Respiratory: (+) cough, (+) shortness of breath   Gastrointestinal: (-)nausea  (-)vomiting, (-) diarrhea  (-) abdominal pain   :  (-)dysuria, (-)frequency, (-)urgency, (-)hematuria  Musculoskeletal: (-) neck pain, (-) back pain, (-) joint pain  Integumentary: (-) rash, (-) edema  Neurological: (-) headache, (-) altered mental status  (-)LOC

## 2020-08-12 NOTE — ED PROVIDER NOTE - PHYSICAL EXAMINATION
General:   moderate resp distress  Head:     NC/AT, EOMI, oral mucosa moist  Neck:     trachea midline  Lungs:   bibasilar crackles mid-base lung, tachypneic  CVS:     S1S2, RRR, no m/g/r  Abd:     +BS, s/nt/nd, no organomegaly  Ext:    2+ radial and pedal pulses, no c/c/e  Neuro: AAOx3, no sensory/motor deficits

## 2020-08-12 NOTE — ED PROVIDER NOTE - PROGRESS NOTE DETAILS
patient became hypoxic when sitting supine to 83%, required pronation with improvement of pulse ox to 93%

## 2020-08-13 NOTE — CONSULT NOTE ADULT - SUBJECTIVE AND OBJECTIVE BOX
[facility]  INFECTIOUS DISEASES AND INTERNAL MEDICINE at [place]  =======================================================  Quinn Rivera MD  Diplomates American Board of Internal Medicine and Infectious Diseases  Tel: [phone]      Fax: [phone]  =======================================================      [id number]  BOUCHRA HERNANDEZ is a 50y  Male     CC: Patient is a 50y old  Male who presents with a chief complaint of Acute Hypoxemic Respiratory Failure, COVID19 PNA (13 Aug 2020 01:28)    HPI:  49 y/o M with PMHX HTN, HLD/Hypertriglyercidemia, DM2 BIBEMS to [facility] ER c/o SOB/MIGUEL, non-productive cough, fever/chills, associated with pleuritic CP which began 6 days ago and has progressively worsened. Patient seen in ER 8/8/20 and had COVID19 PCR screening and was rx'd Amoxicillin at that time. COVID19 PCR positive 8/8/20. Patient hypoxic on arrival 02sat noted 86%. Placed on 6L NC with moderate improvement but subsequently desatted with minimal exertion and was placed in prone position. Currently satting 97% on O2 via NC 6L in prone position. +Sick Contacts similar symptoms at home x2 Wife and Daughter. No tobacco or drug abuse. No other complaints. Denies current CP, HA/Dizziness/lightheadedness, focal deficits, n/v/d, abd pain, or urinary complaints. ROS negative unless mentioned above. (13 Aug 2020 01:28)      PAST MEDICAL & SURGICAL HISTORY:  DM (diabetes mellitus)  Hypertriglyceridemia  Hypertension  No significant past surgical history      Social Hx:    FAMILY HISTORY:  No pertinent family history in first degree relatives      Allergies    No Known Allergies    Intolerances        MEDICATIONS  (STANDING):  ascorbic acid 1500 milliGRAM(s) Oral daily  aspirin enteric coated 325 milliGRAM(s) Oral daily  dexAMETHasone  Injectable 6 milliGRAM(s) IV Push daily  dextrose 5%. 1000 milliLiter(s) (50 mL/Hr) IV Continuous <Continuous>  dextrose 50% Injectable 12.5 Gram(s) IV Push once  dextrose 50% Injectable 25 Gram(s) IV Push once  dextrose 50% Injectable 25 Gram(s) IV Push once  enoxaparin Injectable 40 milliGRAM(s) SubCutaneous every 12 hours  insulin glargine Injectable (LANTUS) 15 Unit(s) SubCutaneous at bedtime  insulin lispro (HumaLOG) corrective regimen sliding scale   SubCutaneous Before meals and at bedtime  insulin lispro Injectable (HumaLOG) 4 Unit(s) SubCutaneous three times a day with meals  lisinopril 10 milliGRAM(s) Oral daily  remdesivir  IVPB 200 milliGRAM(s) IV Intermittent every 24 hours  remdesivir  IVPB   IV Intermittent   thiamine 100 milliGRAM(s) Oral daily  zinc sulfate 220 milliGRAM(s) Oral daily    MEDICATIONS  (PRN):  acetaminophen   Tablet .. 650 milliGRAM(s) Oral every 4 hours PRN Temp greater or equal to 38.5C (101.3F)  acetaminophen  Suppository .. 650 milliGRAM(s) Rectal every 4 hours PRN Temp greater or equal to 38C (100.4F)  ALBUTerol    90 MICROgram(s) HFA Inhaler 2 Puff(s) Inhalation every 4 hours PRN Shortness of Breath and/or Wheezing  dextrose 40% Gel 15 Gram(s) Oral once PRN Blood Glucose LESS THAN 70 milliGRAM(s)/deciliter  glucagon  Injectable 1 milliGRAM(s) IntraMuscular once PRN Glucose LESS THAN 70 milligrams/deciliter  ondansetron Injectable 4 milliGRAM(s) IV Push every 6 hours PRN Nausea and/or Vomiting      ANTIMICROBIALS:  remdesivir  IVPB 200 every 24 hours  remdesivir  IVPB        OTHER MEDS: MEDICATIONS  (STANDING):  acetaminophen   Tablet .. 650 every 4 hours PRN  acetaminophen  Suppository .. 650 every 4 hours PRN  ALBUTerol    90 MICROgram(s) HFA Inhaler 2 every 4 hours PRN  aspirin enteric coated 325 daily  dexAMETHasone  Injectable 6 daily  dextrose 40% Gel 15 once PRN  dextrose 50% Injectable 12.5 once  dextrose 50% Injectable 25 once  dextrose 50% Injectable 25 once  enoxaparin Injectable 40 every 12 hours  glucagon  Injectable 1 once PRN  insulin glargine Injectable (LANTUS) 15 at bedtime  insulin lispro (HumaLOG) corrective regimen sliding scale  Before meals and at bedtime  insulin lispro Injectable (HumaLOG) 4 three times a day with meals  lisinopril 10 daily  ondansetron Injectable 4 every 6 hours PRN             REVIEW OF SYSTEMS:  CONSTITUTIONAL:  No Fever or chills  HEENT:  No diplopia or blurred vision.  No earache, sore throat or runny nose.  CARDIOVASCULAR:  No pressure, squeezing, strangling, tightness, heaviness or aching about the chest, neck, axilla or epigastrium.  RESPIRATORY:  No cough, shortness of breath  GASTROINTESTINAL:  No nausea, vomiting or diarrhea.  GENITOURINARY:  No dysuria, frequency or urgency. No Blood in urine  MUSCULOSKELETAL:  no joint aches, no muscle pain  SKIN:  No change in skin, hair or nails.  NEUROLOGIC:  No Headaches, seizures or weakness.  PSYCHIATRIC:  No disorder of thought or mood.  ENDOCRINE:  No heat or cold intolerance  HEMATOLOGICAL:  No easy bruising or bleeding.           I&O's Detail    12 Aug 2020 07:01  -  13 Aug 2020 07:00  --------------------------------------------------------  IN:  Total IN: 0 mL    OUT:    Voided: 800 mL  Total OUT: 800 mL    Total NET: -800 mL            Physical Exam:  Vital Signs Last 24 Hrs  T(C): 37.6 (13 Aug 2020 08:42), Max: 37.7 (12 Aug 2020 16:28)  T(F): 99.7 (13 Aug 2020 08:42), Max: 99.8 (12 Aug 2020 16:28)  HR: 80 (13 Aug 2020 08:42) (80 - 102)  BP: 117/80 (13 Aug 2020 08:42) (117/80 - 141/75)  BP(mean): --  RR: 19 (13 Aug 2020 08:42) (18 - 24)  SpO2: 92% (13 Aug 2020 08:42) (91% - 97%)  Height (cm): 165.1 (08-12 @ 16:28)  Weight (kg): 84.4 (08-12 @ 16:28)  BMI (kg/m2): 31 (08-12 @ 16:28)  BSA (m2): 1.92 (08-12 @ 16:28)  GEN: NAD, pleasant  HEENT: normocephalic and atraumatic. EOMI. PERRL.  Anicteric  NECK: Supple.   LUNGS: Clear to auscultation.  HEART: Regular rate and rhythm without murmur.  ABDOMEN: Soft, nontender, and nondistended.  Positive bowel sounds.    : No CVA tenderness  EXTREMITIES: Without any edema.  MSK: No joint swelling  NEUROLOGIC: Cranial nerves II through XII are grossly intact. No Focal Deficits  PSYCHIATRIC: Appropriate affect .  SKIN: No Rash        Labs:      Radiology: [facility]  INFECTIOUS DISEASES AND INTERNAL MEDICINE at [place]  =======================================================  Quinn Rivera MD  Diplomates American Board of Internal Medicine and Infectious Diseases  Tel: [phone]      Fax: [phone]  =======================================================      N-[id number]  BOUCHRA HERNANDEZ is a 50y  Male     CC: Patient is a 50y old  Male who presents with a chief complaint of Acute Hypoxemic Respiratory Failure, COVID19 PNA (13 Aug 2020 01:28)    HPI:  51 y/o M with PMHX HTN, HLD/Hypertriglyercidemia, DM2 BIBEMS to [facility] ER c/o SOB/MIGUEL, non-productive cough, fever/chills, associated with pleuritic CP which began 6 days ago and has progressively worsened. Patient seen in ER 8/8/20 and had COVID19 PCR screening and was rx'd Amoxicillin at that time. COVID19 PCR positive 8/8/20. Patient hypoxic on arrival 02sat noted 86%. Placed on 6L NC with moderate improvement but subsequently desatted with minimal exertion and was placed in prone position. Currently satting 97% on O2 via NC 6L in prone position. +Sick Contacts similar symptoms at home x2 Wife and Daughter. No tobacco or drug abuse. No other complaints. Denies current CP, HA/Dizziness/lightheadedness, focal deficits, n/v/d, abd pain, or urinary complaints. ROS negative unless mentioned above. (13 Aug 2020 01:28)     ID [id number]  as above  + sick contacts, no travel    PAST MEDICAL & SURGICAL HISTORY:  DM (diabetes mellitus)  Hypertriglyceridemia  Hypertension  No significant past surgical history      Social Hx: non smoker    FAMILY HISTORY:  No pertinent family history in first degree relatives      Allergies    No Known Allergies    Intolerances        MEDICATIONS  (STANDING):  ascorbic acid 1500 milliGRAM(s) Oral daily  aspirin enteric coated 325 milliGRAM(s) Oral daily  dexAMETHasone  Injectable 6 milliGRAM(s) IV Push daily  dextrose 5%. 1000 milliLiter(s) (50 mL/Hr) IV Continuous <Continuous>  dextrose 50% Injectable 12.5 Gram(s) IV Push once  dextrose 50% Injectable 25 Gram(s) IV Push once  dextrose 50% Injectable 25 Gram(s) IV Push once  enoxaparin Injectable 40 milliGRAM(s) SubCutaneous every 12 hours  insulin glargine Injectable (LANTUS) 15 Unit(s) SubCutaneous at bedtime  insulin lispro (HumaLOG) corrective regimen sliding scale   SubCutaneous Before meals and at bedtime  insulin lispro Injectable (HumaLOG) 4 Unit(s) SubCutaneous three times a day with meals  lisinopril 10 milliGRAM(s) Oral daily  remdesivir  IVPB 200 milliGRAM(s) IV Intermittent every 24 hours  remdesivir  IVPB   IV Intermittent   thiamine 100 milliGRAM(s) Oral daily  zinc sulfate 220 milliGRAM(s) Oral daily    MEDICATIONS  (PRN):  acetaminophen   Tablet .. 650 milliGRAM(s) Oral every 4 hours PRN Temp greater or equal to 38.5C (101.3F)  acetaminophen  Suppository .. 650 milliGRAM(s) Rectal every 4 hours PRN Temp greater or equal to 38C (100.4F)  ALBUTerol    90 MICROgram(s) HFA Inhaler 2 Puff(s) Inhalation every 4 hours PRN Shortness of Breath and/or Wheezing  dextrose 40% Gel 15 Gram(s) Oral once PRN Blood Glucose LESS THAN 70 milliGRAM(s)/deciliter  glucagon  Injectable 1 milliGRAM(s) IntraMuscular once PRN Glucose LESS THAN 70 milligrams/deciliter  ondansetron Injectable 4 milliGRAM(s) IV Push every 6 hours PRN Nausea and/or Vomiting      ANTIMICROBIALS:  remdesivir  IVPB 200 every 24 hours  remdesivir  IVPB        OTHER MEDS: MEDICATIONS  (STANDING):  acetaminophen   Tablet .. 650 every 4 hours PRN  acetaminophen  Suppository .. 650 every 4 hours PRN  ALBUTerol    90 MICROgram(s) HFA Inhaler 2 every 4 hours PRN  aspirin enteric coated 325 daily  dexAMETHasone  Injectable 6 daily  dextrose 40% Gel 15 once PRN  dextrose 50% Injectable 12.5 once  dextrose 50% Injectable 25 once  dextrose 50% Injectable 25 once  enoxaparin Injectable 40 every 12 hours  glucagon  Injectable 1 once PRN  insulin glargine Injectable (LANTUS) 15 at bedtime  insulin lispro (HumaLOG) corrective regimen sliding scale  Before meals and at bedtime  insulin lispro Injectable (HumaLOG) 4 three times a day with meals  lisinopril 10 daily  ondansetron Injectable 4 every 6 hours PRN             REVIEW OF SYSTEMS:  CONSTITUTIONAL:  No Fever or chills  HEENT:  No diplopia or blurred vision.  No earache, sore throat or runny nose.  CARDIOVASCULAR:  No pressure, squeezing, strangling, tightness, heaviness or aching about the chest, neck, axilla or epigastrium.  RESPIRATORY:  No cough, shortness of breath  GASTROINTESTINAL:  No nausea, vomiting or diarrhea.  GENITOURINARY:  No dysuria, frequency or urgency. No Blood in urine  MUSCULOSKELETAL:  no joint aches, no muscle pain  SKIN:  No change in skin, hair or nails.  NEUROLOGIC:  No Headaches, seizures or weakness.  PSYCHIATRIC:  No disorder of thought or mood.  ENDOCRINE:  No heat or cold intolerance  HEMATOLOGICAL:  No easy bruising or bleeding.           I&O's Detail    12 Aug 2020 07:01  -  13 Aug 2020 07:00  --------------------------------------------------------  IN:  Total IN: 0 mL    OUT:    Voided: 800 mL  Total OUT: 800 mL    Total NET: -800 mL            Physical Exam:  Vital Signs Last 24 Hrs  T(C): 37.6 (13 Aug 2020 08:42), Max: 37.7 (12 Aug 2020 16:28)  T(F): 99.7 (13 Aug 2020 08:42), Max: 99.8 (12 Aug 2020 16:28)  HR: 80 (13 Aug 2020 08:42) (80 - 102)  BP: 117/80 (13 Aug 2020 08:42) (117/80 - 141/75)  BP(mean): --  RR: 19 (13 Aug 2020 08:42) (18 - 24)  SpO2: 92% (13 Aug 2020 08:42) (91% - 97%)  Height (cm): 165.1 (08-12 @ 16:28)  Weight (kg): 84.4 (08-12 @ 16:28)  BMI (kg/m2): 31 (08-12 @ 16:28)  BSA (m2): 1.92 (08-12 @ 16:28)  GEN: NAD, pleasant on O2 5l mild respiratory distress  HEENT: normocephalic and atraumatic. EOMI. PERRL.  Anicteric  NECK: Supple.   LUNGS: crackles to auscultation.  HEART: Regular rate and rhythm without murmur.  ABDOMEN: Soft, nontender, and nondistended.  Positive bowel sounds.    : No CVA tenderness  EXTREMITIES: Without any edema.  MSK: No joint swelling  NEUROLOGIC: Cranial nerves II through XII are grossly intact. No Focal Deficits  PSYCHIATRIC: Appropriate affect .  SKIN: No Rash        Labs:                        16.5   5.65  )-----------( 261      ( 13 Aug 2020 09:13 )             48.5   08-13    135  |  96<L>  |  14.0  ----------------------------<  302<H>  4.7   |  22.0  |  0.52    Ca    9.2      13 Aug 2020 09:12  Phos  3.0     08-13  Mg     2.3     08-13    TPro  7.7  /  Alb  3.6  /  TBili  0.3<L>  /  DBili  x   /  AST  31  /  ALT  30  /  AlkPhos  68  08-13      Radiology:  < from: CT Angio Chest w/ IV Cont (08.12.20 @ 22:44) >  IMPRESSION:  No evidence of pulmonary embolism.    Extensive, bilaterally asymmetric and peripherally predominant groundglass opacities concerning for atypical infection, especially a viral etiology such as COVID-19. Alternative etiologies include diffuse alveolar hemorrhage, eosinophilic lung disease, or vasculitis.    < end of copied text >

## 2020-08-13 NOTE — CHART NOTE - NSCHARTNOTEFT_GEN_A_CORE
Pt with hypoxia on 6L NC, COVID +ve, started on dexamethasone 6mg IV daily. ID consult was not called last night. Dr. Granados was called for starting remdesmir Patient seen and examined at bedside. Agree with above H & P. Pt with SOB & dry cough. Pt denies any travel or sick contacts. Few family members are now sick with COVID. Hypoxia on 6L NC, COVID +ve. Started on dexamethasone 6mg IV daily. ID consult was not called last night. Dr. Rivera was called for starting remdesmir. C/w prone positioning, vitamin C, thiamine, zinc. c/w rest of meds. Monitor resp status. DM type 2 uncontrolled. A1c 11. Added lantus & humalog. ISS. Hold home meds. Will need diabetic teaching.       PHYSICAL EXAM-  GENERAL: mildly SOB, on 6L NC  HEAD:  Atraumatic, Normocephalic  EYES: EOMI, PERRLA, conjunctiva and sclera clear  NECK: Supple, No JVD  NERVOUS SYSTEM:  Alert & Oriented X3, Motor Strength 5/5 B/L upper and lower extremities; DTRs 2+ intact and symmetric  CHEST/LUNG: decreased BS b/l  HEART: Regular rate and rhythm; No murmurs, rubs, or gallops  ABDOMEN: Soft, Nontender, Nondistended; Bowel sounds present  EXTREMITIES:  2+ Peripheral Pulses, No clubbing, cyanosis, or edema  SKIN: No rashes or lesions

## 2020-08-13 NOTE — CONSULT NOTE ADULT - ASSESSMENT
51 y/o M with PMHX HTN, HLD/Hypertriglyercidemia, DM2 BIBEMS to [facility] ER c/o SOB/MIGUEL, non-productive cough, fever/chills, associated with pleuritic CP which began 6 days ago and has progressively worsened. Patient seen in ER 8/8/20 and had COVID19 PCR screening and was rx'd Amoxicillin at that time. COVID19 PCR positive 8/8/20. Patient hypoxic on arrival 02sat noted 86%. Placed on 6L NC with moderate improvement but subsequently desatted with minimal exertion and was placed in prone position. Currently satting 97% on O2 via NC 6L in prone position. +Sick Contacts similar symptoms at home x2 Wife and Daughter.     COVID 19 pneumonia  Acute hypoxic respiratory failure  Fever  Uncontrolled DM      - inflammatory markers elevated  - CTA extensive b/l GGO-no PE  - c/w decadron 6 mg daily-monitor blood glucose levels  - Remdesivir started under EUA- patient consented. [place] fact sheet provided and all questions addressed  - Data regarding treatment of COVID 19 continues to evolve. Optimized supportive care remains the mainstay of therapy  - Avoid antibiotics unless there is a concern for a bacterial infection  - VTE prophylaxis per current [facility] COVID 19 protocol  - Check CBC with diff, CMP with LFT's, Inflammatory markers (ESR, CRP, Ferritin, LDH,  procalcitonin)  q48h.  D dimer, lactate, troponin, CK, PT/PTT, type and screen x 1  - Encourage self proning q2h and ambulation as tolerated  - Taper off O2 as tolerated- once tolerating room air would ideally monitor for 24h prior to discharge. Continue self quarantine at home x 14 days from date of positive COVID 19 PCR  - Inpatient Contact/Airborne isolation       d/w Dr Khoury, pharmacy  Will Follow

## 2020-08-13 NOTE — H&P ADULT - HISTORY OF PRESENT ILLNESS
49 y/o M with PMHX HTN, HLD/Hypertriglyercidemia, DM2 BIBEMS to [facility] ER c/o SOB/MIGUEL, non-productive cough, fever/chills, associated with pleuritic CP which began 6 days ago and has progressively worsened. Patient seen in ER 8/8/20 and had COVID19 PCR screening and was rx'd Amoxicillin at that time. COVID19 PCR positive 8/8/20. Patient hypoxic on arrival 02sat noted 86%. Placed on 6L NC with moderate improvement but subsequently desatted with minimal exertion and was placed in prone position. Currently satting 97% on O2 via NC 6L in prone position. +Sick Contacts similar symptoms at home x2 Wife and Daughter. No tobacco or drug abuse. No other complaints. Denies current CP, HA/Dizziness/lightheadedness, focal deficits, n/v/d, abd pain, or urinary complaints. ROS negative unless mentioned above.

## 2020-08-13 NOTE — ED ADULT NURSE REASSESSMENT NOTE - NS ED NURSE REASSESS COMMENT FT1
assumed care of pt pt using urinal at bedside, +dyspnea on exertion. Romansh interpeter Shona at bedside updated pt on POC. pt verbalized understanding denies pain at this time.

## 2020-08-13 NOTE — H&P ADULT - NSHPPHYSICALEXAM_GEN_ALL_CORE
Vital Signs Last 24 Hrs  T(C): 37.3 (13 Aug 2020 00:09), Max: 37.7 (12 Aug 2020 16:28)  T(F): 99.2 (13 Aug 2020 00:09), Max: 99.8 (12 Aug 2020 16:28)  HR: 93 (13 Aug 2020 00:09) (89 - 102)  BP: 141/75 (13 Aug 2020 00:09) (132/73 - 141/75)  BP(mean): --  RR: 24 (13 Aug 2020 00:09) (18 - 24)  SpO2: 91% (13 Aug 2020 00:09) (91% - 97%)    Constitutional: Well-appearing, NAD, VSS  Head: NC/AT  Eyes: PERRL, EOMI, anicteric sclera, conjunctiva WNL  ENT: Normal Pharynx, MMM, No tonsillar exudate/erythema  Neck: Supple, Non-tender  Chest: Non-tender, no rashes  Cardio: +Tachycardia, regular,, s1/s2, no appreciable murmurs/rubs/gallops  Resp: +Tachypnea, Decreased BS bilaterally with bibasilar rales  Abd: Soft, Non-tender, Non-distended, no rebound/guarding/rigidity  MSK: moving all extremities, no motor weakness, full ROM x4  Ext: palpable distal pulses, good capillary refill, no clubbing/cyanosis/edema  Psych: appropriate, cooperative  Neuro: CN II-XII grossly intact, no focal deficits  Skin: Warm/Dry. No rashes.

## 2020-08-13 NOTE — H&P ADULT - ASSESSMENT
51 y/o M with PMHX HTN, HLD/Hypertriglyercidemia, DM2 BIBEMS to [facility] ER c/o SOB/MIGUEL, non-productive cough, fever/chills, associated with pleuritic CP which began 6 days ago and has progressively worsened. Patient seen in ER 8/8/20 and had COVID19 PCR screening and was rx'd Amoxicillin at that time. COVID19 PCR positive 8/8/20. Patient hypoxic on arrival 02sat noted 86%. Placed on 6L NC with moderate improvement but subsequently desatted with minimal exertion and was placed in prone position. Currently satting 97% on O2 via NC 6L in prone position. +Sick Contacts similar symptoms at home x2 Wife and Daughter. No tobacco or drug abuse. No other complaints. Denies current CP, HA/Dizziness/lightheadedness, focal deficits, n/v/d, abd pain, or urinary complaints. ROS negative unless mentioned above.     A/P:  Acute Hypoxemic Respiratory Failure 2/2 COVID19 PNA  -COVID19 PCR Positive 8/8/20  -CTA Chest negative for PE. +Extensive bilaterally asymmetric and peripherally     predominant groundglass opacities  -Admit to Medicine  -repeat labs/trend lymphocytopenia  -trend inflammatory markers  -f/u cultures pending  -monitor telemetry  -O2 via NC titrate for O2sat >90% and <96%  -Self Prone Protocol q20 mins  -trend inflammatory markers  -Dexamethaxone 10mg IVP x1 in ER  -Continue Dexamethasone 6mg IV q24 x10-14 days or until hypoxia resolves  -DC Augmentin for now with +COVID19 PCR. Hold further abx.  -Tylenol PRN for Fever  -VTE PPX Lovenox 40mg subq BID due to pro-thrombotic state with COVID  -Zinc Sulfate 50mg PO q24 (home med)  -Isolation Precautions  -ID Consulted     AGMA  -pH 7.41 on ABG. Repeat BMP. Trend Acidosis.     Polycythemia  -Hgb 17.1. Trend CBC. May need IVF but hold for now with COVID    Pseudohyponatremia  -Na 132 corrected for hyperglycemia Na 139. Glucose management as below.    Hyperglycemia, DM2  -Hold Metformin/Trulicity  -ISS. Titrate ISS as needed. Accuchecks. ADA Diet.   -BS >350 on arrival with addition of Dexamethasone will start basal insulin   -Start Lantus 15 units q24 QHS  -Insulin Regular 5units subq now x1    Transaminitis  -likely 2/2 Covid v diffuse hepatic steatosis seen on CT  -Trend LFTs  -hold Omega 3 Fatty Acids for now    CAD, HTN  -Continue Lisinopril 10mg PO q24   -Continue ASA 325mg PO Q24    Med Rec  -Patient says he only takes the medications noted in med rec (has in bag with him) but pharmacy records show Simvastatin and Glipizide as well

## 2020-08-14 NOTE — PROGRESS NOTE ADULT - ASSESSMENT
51 y/o M with PMHX HTN, HLD/Hypertriglyercidemia, DM2 BIBEMS to [facility] ER c/o SOB/MIGUEL, non-productive cough, fever/chills, associated with pleuritic CP which began 6 days ago and has progressively worsened. Patient seen in ER 8/8/20 and had COVID19 PCR screening and was rx'd Amoxicillin at that time. COVID19 PCR positive 8/8/20. Patient hypoxic on arrival 02sat noted 86%. Placed on 6L NC with moderate improvement but subsequently desatted with minimal exertion and was placed in prone position. Currently satting 97% on O2 via NC 6L in prone position. +Sick Contacts similar symptoms at home x2 Wife and Daughter.     COVID 19 pneumonia  Acute hypoxic respiratory failure  Fever  Uncontrolled DM      - inflammatory markers elevated  - CTA extensive b/l GGO-no PE     - Continue Remdesivir started under EUA  - patient consented. [place] fact sheet provided and all questions addressed    Continue steroids  - consider increasing dose     - Data regarding treatment of COVID 19 continues to evolve. Optimized supportive care remains the mainstay of therapy  - Avoid antibiotics unless there is a concern for a bacterial infection  - VTE prophylaxis per current [facility] COVID 19 protocol  - Check CBC with diff, CMP with LFT's, Inflammatory markers (ESR, CRP, Ferritin, LDH,  procalcitonin)  q48h.  D dimer, lactate, troponin, CK, PT/PTT, type and screen x 1  - Encourage self proning q2h and ambulation as tolerated  - Taper off O2 as tolerated- once tolerating room air would ideally monitor for 24h prior to discharge. Continue self quarantine at home x 14 days from date of positive COVID 19 PCR  - Inpatient Contact/Airborne isolation

## 2020-08-14 NOTE — PROGRESS NOTE ADULT - SUBJECTIVE AND OBJECTIVE BOX
[facility]  INFECTIOUS DISEASES AND INTERNAL MEDICINE at [place]  =======================================================  Quinn Rivera MD  Diplomates American Board of Internal Medicine and Infectious Diseases  Tel  [phone]  Fax [phone]  =======================================================    [facility]-[id number]  BOUCHRA HERNANDEZ   follow up for:  COVID-19 pneumonia  patient seen and examined.     reports cough and difficulty breathing      I have personally reviewed the labs and data; pertinent labs and data are listed in this note; please see below.   ===================================================  REVIEW OF SYSTEMS:  CONSTITUTIONAL:  No Fever or chills  HEENT:  No diplopia or blurred vision.  No earache, sore throat or runny nose.  CARDIOVASCULAR:  No pressure, squeezing, strangling, tightness, heaviness or aching about the chest, neck, axilla or epigastrium.  RESPIRATORY:  POSITIVE cough, shortness of breath  GASTROINTESTINAL:  No nausea, vomiting or diarrhea.  GENITOURINARY:  No dysuria, frequency or urgency. No Blood in urine  MUSCULOSKELETAL:  no joint aches, no muscle pain  SKIN:  No change in skin, hair or nails.  NEUROLOGIC:  No Headaches, seizures or weakness.  PSYCHIATRIC:  No disorder of thought or mood.  ENDOCRINE:  No heat or cold intolerance  HEMATOLOGICAL:  No easy bruising or bleeding.   =======================================================  Allergies  No Known Allergies    Antibiotics:  remdesivir  IVPB   IV Intermittent   remdesivir  IVPB 100 milliGRAM(s) IV Intermittent every 24 hours    Other medications:  ascorbic acid 1500 milliGRAM(s) Oral daily  aspirin enteric coated 325 milliGRAM(s) Oral daily  dexAMETHasone  Injectable 6 milliGRAM(s) IV Push daily  dextrose 5%. 1000 milliLiter(s) IV Continuous <Continuous>  dextrose 50% Injectable 12.5 Gram(s) IV Push once  dextrose 50% Injectable 25 Gram(s) IV Push once  dextrose 50% Injectable 25 Gram(s) IV Push once  enoxaparin Injectable 40 milliGRAM(s) SubCutaneous every 12 hours  insulin glargine Injectable (LANTUS) 15 Unit(s) SubCutaneous at bedtime  insulin lispro (HumaLOG) corrective regimen sliding scale   SubCutaneous Before meals and at bedtime  insulin lispro Injectable (HumaLOG) 4 Unit(s) SubCutaneous three times a day with meals  lisinopril 10 milliGRAM(s) Oral daily  thiamine 100 milliGRAM(s) Oral daily  zinc sulfate 220 milliGRAM(s) Oral daily    ======================================================  Physical Exam:  ============  T(F): 98.3 (13 Aug 2020 23:52), Max: 98.7 (13 Aug 2020 12:35)  HR: 84 (14 Aug 2020 05:23)  BP: 123/78 (14 Aug 2020 05:23)  RR: 18 (14 Aug 2020 05:23)  SpO2: 92% (14 Aug 2020 05:23) (90% - 98%)  temp max in last 48H T(F): , Max: 99.8 (08-12-20 @ 16:28)    General:  No acute distress.  Eye: Pupils are equal, round and reactive to light, Extraocular movements are intact, Normal conjunctiva.  HENT: Normocephalic, Oral mucosa is moist, No pharyngeal erythema, No sinus tenderness.  Neck: Supple, No lymphadenopathy.  Respiratory: Lungs with COARSE breath sounds  Cardiovascular: Normal rate, Regular rhythm,    Gastrointestinal: Soft, Non-tender, Non-distended, Normal bowel sounds.  Genitourinary: No costovertebral angle tenderness.  Lymphatics: No lymphadenopathy neck,   Musculoskeletal: Normal range of motion, Normal strength.  Integumentary: No rash.  Neurologic: Alert, Oriented, No focal deficits, Cranial Nerves II-XII are grossly intact.  Psychiatric: Appropriate mood & affect.  =======================================================      Labs:                        16.4   8.39  )-----------( 317      ( 14 Aug 2020 08:08 )             49.0      08-13    x   |  x   |  x   ----------------------------<  x   x    |  x   |  0.49<L>    Ca    9.2      13 Aug 2020 09:12  Phos  3.0     08-13  Mg     2.3     08-13    TPro  7.2  /  Alb  3.3  /  TBili  0.2<L>  /  DBili  0.1  /  AST  32  /  ALT  31  /  AlkPhos  69  08-13      Culture - Urine (collected 08-13-20 @ 05:39)  Source: .Urine Clean Catch (Midstream)  Final Report (08-14-20 @ 06:38):    <10,000 CFU/mL Normal Urogenital Karolina      Creatinine, Serum: 0.49 mg/dL (08-13-20 @ 17:52)  Creatinine, Serum: 0.52 mg/dL (08-13-20 @ 09:12)  Creatinine, Serum: 0.69 mg/dL (08-12-20 @ 17:42)    Procalcitonin, Serum: 0.13 ng/mL (08-13-20 @ 09:12)  Procalcitonin, Serum: 0.15 ng/mL (08-12-20 @ 17:42)    D-Dimer Assay, Quantitative: <150 ng/mL DDU (08-13-20 @ 09:13)  D-Dimer Assay, Quantitative: <150 ng/mL DDU (08-12-20 @ 17:42)    Ferritin, Serum: 1391 ng/mL (08-12-20 @ 17:42)    C-Reactive Protein, Serum: 13.83 mg/dL (08-13-20 @ 09:12)  C-Reactive Protein, Serum: 12.22 mg/dL (08-12-20 @ 17:42)    WBC Count: 8.39 K/uL (08-14-20 @ 08:08)  WBC Count: 5.65 K/uL (08-13-20 @ 09:13)  WBC Count: 4.16 K/uL (08-12-20 @ 17:42)      COVID-19 PCR: Detected (08-12-20 @ 17:41)  COVID-19 PCR: Detected (08-08-20 @ 13:29)    Lactate Dehydrogenase, Serum: 428 U/L (08-13-20 @ 09:12)    Alkaline Phosphatase, Serum: 69 U/L (08-13-20 @ 17:52)  Alkaline Phosphatase, Serum: 68 U/L (08-13-20 @ 09:12)  Alkaline Phosphatase, Serum: 73 U/L (08-12-20 @ 17:42)  Alanine Aminotransferase (ALT/SGPT): 31 U/L (08-13-20 @ 17:52)  Alanine Aminotransferase (ALT/SGPT): 30 U/L (08-13-20 @ 09:12)  Alanine Aminotransferase (ALT/SGPT): 37 U/L (08-12-20 @ 17:42)  Aspartate Aminotransferase (AST/SGOT): 32 U/L (08-13-20 @ 17:52)  Aspartate Aminotransferase (AST/SGOT): 31 U/L (08-13-20 @ 09:12)  Aspartate Aminotransferase (AST/SGOT): 44 U/L (08-12-20 @ 17:42)  Bilirubin Total, Serum: 0.2 mg/dL (08-13-20 @ 17:52)  Bilirubin Total, Serum: 0.3 mg/dL (08-13-20 @ 09:12)  Bilirubin Total, Serum: 0.3 mg/dL (08-12-20 @ 17:42)  Bilirubin Direct, Serum: 0.1 mg/dL (08-13-20 @ 17:52)

## 2020-08-14 NOTE — CONSULT NOTE ADULT - SUBJECTIVE AND OBJECTIVE BOX
PULMONARY CONSULT NOTE      MARILIN HERNANDEZANUJA-[id number]    Patient is a 50y old  Male who presents with a chief complaint of Acute Hypoxemic Respiratory Failure, COVID19 PNA (13 Aug 2020 12:20)      HISTORY OF PRESENT ILLNESS:  49 y/o M with PMHX HTN, HLD/Hypertriglyercidemia, DM2 BIBEMS to [facility] ER c/o SOB/MIGUEL, non-productive cough, fever/chills, associated with pleuritic CP which began 6 days ago and has progressively worsened. Patient seen in ER 20 and had COVID19 PCR screening and was rx'd Amoxicillin at that time. COVID19 PCR positive 20. Patient hypoxic on arrival 02sat noted 86%. Placed on 6L NC with moderate improvement but subsequently desatted with minimal exertion and was placed in prone position. Currently satting 97% on O2 via NC 6L in prone position. +Sick Contacts similar symptoms at home x2 Wife and Daughter. No tobacco or drug abuse. No other complaints. Denies current CP, HA/Dizziness/lightheadedness, focal deficits, n/v/d, abd pain, or urinary complaints. ROS negative unless mentioned above.       MEDICATIONS  (STANDING):  ascorbic acid 1500 milliGRAM(s) Oral daily  aspirin enteric coated 325 milliGRAM(s) Oral daily  dexAMETHasone  Injectable 6 milliGRAM(s) IV Push daily  dextrose 5%. 1000 milliLiter(s) (50 mL/Hr) IV Continuous <Continuous>  dextrose 50% Injectable 12.5 Gram(s) IV Push once  dextrose 50% Injectable 25 Gram(s) IV Push once  dextrose 50% Injectable 25 Gram(s) IV Push once  enoxaparin Injectable 40 milliGRAM(s) SubCutaneous every 12 hours  insulin glargine Injectable (LANTUS) 20 Unit(s) SubCutaneous at bedtime  insulin lispro (HumaLOG) corrective regimen sliding scale   SubCutaneous Before meals and at bedtime  insulin lispro Injectable (HumaLOG) 6 Unit(s) SubCutaneous three times a day with meals  lisinopril 10 milliGRAM(s) Oral daily  remdesivir  IVPB 100 milliGRAM(s) IV Intermittent every 24 hours  remdesivir  IVPB   IV Intermittent   thiamine 100 milliGRAM(s) Oral daily  zinc sulfate 220 milliGRAM(s) Oral daily      MEDICATIONS  (PRN):  acetaminophen   Tablet .. 650 milliGRAM(s) Oral every 4 hours PRN Temp greater or equal to 38.5C (101.3F)  acetaminophen  Suppository .. 650 milliGRAM(s) Rectal every 4 hours PRN Temp greater or equal to 38C (100.4F)  ALBUTerol    90 MICROgram(s) HFA Inhaler 2 Puff(s) Inhalation every 4 hours PRN Shortness of Breath and/or Wheezing  dextrose 40% Gel 15 Gram(s) Oral once PRN Blood Glucose LESS THAN 70 milliGRAM(s)/deciliter  glucagon  Injectable 1 milliGRAM(s) IntraMuscular once PRN Glucose LESS THAN 70 milligrams/deciliter  ondansetron Injectable 4 milliGRAM(s) IV Push every 6 hours PRN Nausea and/or Vomiting      Allergies    No Known Allergies    Intolerances        PAST MEDICAL & SURGICAL HISTORY:  DM (diabetes mellitus)  Hypertriglyceridemia  Hypertension  No significant past surgical history      FAMILY HISTORY:  No pertinent family history in first degree relatives      SOCIAL HISTORY  Smoking History:     REVIEW OF SYSTEMS:    CONSTITUTIONAL:  No fevers, chills, sweats    HEENT:  Eyes:  No diplopia or blurred vision. ENT:  No earache, sore throat or runny nose. sinus headache or postnasl drip    CARDIOVASCULAR:  No pressure, squeezing, tightness, or heaviness about the chest; no palpitations, leg swelling, orthopnea or PND    RESPIRATORY:  No cough, shortness of breath, PND or orthopnea. Mild SOBOE    GASTROINTESTINAL:  No abdominal pain, nausea, vomiting or diarrhea.    GENITOURINARY:  No dysuria, frequency or urgency.    NEUROLOGIC:  No paresthesias, fasciculations, seizures or weakness.    PSYCHIATRIC:  No disorder of thought or mood.    Vital Signs Last 24 Hrs  T(C): 37.2 (14 Aug 2020 08:30), Max: 37.2 (14 Aug 2020 08:30)  T(F): 99 (14 Aug 2020 08:30), Max: 99 (14 Aug 2020 08:30)  HR: 100 (14 Aug 2020 08:30) (79 - 100)  BP: 123/75 (14 Aug 2020 08:30) (111/65 - 124/78)  BP(mean): --  RR: 19 (14 Aug 2020 08:30) (18 - 20)  SpO2: 92% (14 Aug 2020 05:23) (90% - 98%)    PHYSICAL EXAMINATION:    GENERAL: The patient is a well-developed, well-nourished _____in no apparent distress.     HEENT: Head is normocephalic and atraumatic. Extraocular muscles are intact. Mucous membranes are moist.     NECK: Supple.     LUNGS: Clear to auscultation without wheezing, rales, or rhonchi. Respirations unlabored    HEART: Regular rate and rhythm without murmur.    ABDOMEN: Soft, nontender, and nondistended.  No hepatosplenomegaly is noted.    EXTREMITIES: Without any cyanosis, clubbing, rash, lesions or edema.    NEUROLOGIC: Grossly intact.      LABS:                        16.4   8.39  )-----------( 317      ( 14 Aug 2020 08:08 )             49.0     08-14    137  |  96<L>  |  18.0  ----------------------------<  272<H>  4.5   |  23.0  |  0.45<L>    Ca    9.2      14 Aug 2020 08:08  Phos  2.8     08-14  Mg     2.3     08-14    TPro  7.3  /  Alb  3.3  /  TBili  0.4  /  DBili  0.1  /  AST  27  /  ALT  25  /  AlkPhos  68  08-14    PT/INR - ( 12 Aug 2020 17:42 )   PT: 13.3 sec;   INR: 1.15 ratio         PTT - ( 12 Aug 2020 17:42 )  PTT:33.1 sec  Urinalysis Basic - ( 13 Aug 2020 01:15 )    Color: Yellow / Appearance: Clear / S.015 / pH: x  Gluc: x / Ketone: Moderate  / Bili: Negative / Urobili: Negative mg/dL   Blood: x / Protein: 100 mg/dL / Nitrite: Negative   Leuk Esterase: Small / RBC: 6-10 /HPF / WBC 6-10   Sq Epi: x / Non Sq Epi: Occasional / Bacteria: Few      ABG - ( 12 Aug 2020 17:41 )  pH, Arterial: 7.41  pH, Blood: x     /  pCO2: 36    /  pO2: 65    / HCO3: 23    / Base Excess: -1.6  /  SaO2: 93                CARDIAC MARKERS ( 13 Aug 2020 09:12 )  x     / <0.01 ng/mL / x     / x     / x      CARDIAC MARKERS ( 12 Aug 2020 17:42 )  x     / <0.01 ng/mL / x     / x     / x            Serum Pro-Brain Natriuretic Peptide: 35 pg/mL (20 @ 17:42)      Procalcitonin, Serum: 0.13 ng/mL (20 @ 09:12)  Procalcitonin, Serum: 0.15 ng/mL (20 @ 17:42)      MICROBIOLOGY:    RADIOLOGY & ADDITIONAL STUDIES:  < from: CT Angio Chest w/ IV Cont (20 @ 22:44) >   EXAM:  CT ANGIO CHEST (W)AW IC                          PROCEDURE DATE:  2020          INTERPRETATION:  CLINICAL INFORMATION: Chest pain, shortness of breath, hypoxia.    COMPARISON: None.    PROCEDURE:  CT Angiography of the Chest.  57 ml of Omnipaque 350 was injected intravenously.  Sagittal and coronal reformats were performed as well as 3D (MIP) reconstructions.    FINDINGS:    LUNGS AND AIRWAYS: Patent central airways.  Bilaterally asymmetric, peripheral predominant groundglass opacities are seen most pronounced lower lobes.  PLEURA: No pleural effusion.  MEDIASTINUM AND LUH: No lymphadenopathy. A tiny right paratracheal air cyst versus tracheal diverticulum is seen.  VESSELS: No intraluminal filling defect in the main, lobar, segmental pulmonary arteries, with limited evaluation of the subsegmental pulmonary arteries. Aortic root and arch are normal in caliber.  HEART: Heart size is normal. No pericardial effusion.  CHEST WALL AND LOWER NECK: Within normal limits.  VISUALIZED UPPER ABDOMEN: Probable diffuse hepatic steatosis.  BONES: Degenerative changes.    IMPRESSION:  No evidence of pulmonary embolism.    Extensive, bilaterally asymmetric and peripherally predominant groundglass opacities concerning for atypical infection, especially a viral etiology such as COVID-19. Alternative etiologies include diffuse alveolar hemorrhage, eosinophilic lung disease, or vasculitis.                BENNY MCCANN MD; Attending Radiologist  This document has been electronically signed. Aug 12 2020 11:17PM        < end of copied text >  All films reviewed on PACS PULMONARY CONSULT NOTE      MARILIN HERNANDEZANUJA-[id number]    Patient is a 50y old  Male who presents with a chief complaint of Acute Hypoxemic Respiratory Failure, COVID19 PNA (13 Aug 2020 12:20)      HISTORY OF PRESENT ILLNESS:  51 y/o M with PMHX HTN, HLD/Hypertriglyercidemia, DM2 BIBEMS to [facility] ER c/o SOB/MIGUEL, non-productive cough, fever/chills, associated with pleuritic CP which began 6 days ago and has progressively worsened. Patient seen in ER 20 and had COVID19 PCR screening and was rx'd Amoxicillin at that time. COVID19 PCR positive 20. Patient hypoxic on arrival 02sat noted 86%. Placed on 6L NC with moderate improvement but subsequently desatted with minimal exertion and was placed in prone position. Currently satting 97% on O2 via NC 6L in prone position. +Sick Contacts similar symptoms at home x2 Wife and Daughter. No tobacco or drug abuse. No other complaints. Denies current CP, HA/Dizziness/lightheadedness, focal deficits, n/v/d, abd pain, or urinary complaints. ROS negative unless mentioned above.     Hypoxia worsening this am with mild cough. Positioning    MEDICATIONS  (STANDING):  ascorbic acid 1500 milliGRAM(s) Oral daily  aspirin enteric coated 325 milliGRAM(s) Oral daily  dexAMETHasone  Injectable 6 milliGRAM(s) IV Push daily  dextrose 5%. 1000 milliLiter(s) (50 mL/Hr) IV Continuous <Continuous>  dextrose 50% Injectable 12.5 Gram(s) IV Push once  dextrose 50% Injectable 25 Gram(s) IV Push once  dextrose 50% Injectable 25 Gram(s) IV Push once  enoxaparin Injectable 40 milliGRAM(s) SubCutaneous every 12 hours  insulin glargine Injectable (LANTUS) 20 Unit(s) SubCutaneous at bedtime  insulin lispro (HumaLOG) corrective regimen sliding scale   SubCutaneous Before meals and at bedtime  insulin lispro Injectable (HumaLOG) 6 Unit(s) SubCutaneous three times a day with meals  lisinopril 10 milliGRAM(s) Oral daily  remdesivir  IVPB 100 milliGRAM(s) IV Intermittent every 24 hours  remdesivir  IVPB   IV Intermittent   thiamine 100 milliGRAM(s) Oral daily  zinc sulfate 220 milliGRAM(s) Oral daily      MEDICATIONS  (PRN):  acetaminophen   Tablet .. 650 milliGRAM(s) Oral every 4 hours PRN Temp greater or equal to 38.5C (101.3F)  acetaminophen  Suppository .. 650 milliGRAM(s) Rectal every 4 hours PRN Temp greater or equal to 38C (100.4F)  ALBUTerol    90 MICROgram(s) HFA Inhaler 2 Puff(s) Inhalation every 4 hours PRN Shortness of Breath and/or Wheezing  dextrose 40% Gel 15 Gram(s) Oral once PRN Blood Glucose LESS THAN 70 milliGRAM(s)/deciliter  glucagon  Injectable 1 milliGRAM(s) IntraMuscular once PRN Glucose LESS THAN 70 milligrams/deciliter  ondansetron Injectable 4 milliGRAM(s) IV Push every 6 hours PRN Nausea and/or Vomiting      Allergies    No Known Allergies    Intolerances        PAST MEDICAL & SURGICAL HISTORY:  DM (diabetes mellitus)  Hypertriglyceridemia  Hypertension  No significant past surgical history      FAMILY HISTORY:  No pertinent family history in first degree relatives      SOCIAL HISTORY  Smoking History:     REVIEW OF SYSTEMS:    CONSTITUTIONAL:  No fevers, chills, sweats    HEENT:  Eyes:  No diplopia or blurred vision. ENT:  No earache, sore throat or runny nose. sinus headache or postnasl drip    CARDIOVASCULAR:  No pressure, squeezing, tightness, or heaviness about the chest; no palpitations, leg swelling, orthopnea or PND    RESPIRATORY:  above    GASTROINTESTINAL:  No abdominal pain, nausea, vomiting or diarrhea.    GENITOURINARY:  No dysuria, frequency or urgency.    NEUROLOGIC:  No paresthesias, fasciculations, seizures or weakness.    PSYCHIATRIC:  No disorder of thought or mood.    Vital Signs Last 24 Hrs  T(C): 37.2 (14 Aug 2020 08:30), Max: 37.2 (14 Aug 2020 08:30)  T(F): 99 (14 Aug 2020 08:30), Max: 99 (14 Aug 2020 08:30)  HR: 100 (14 Aug 2020 08:30) (79 - 100)  BP: 123/75 (14 Aug 2020 08:30) (111/65 - 124/78)  BP(mean): --  RR: 19 (14 Aug 2020 08:30) (18 - 20)  SpO2: 92% (14 Aug 2020 05:23) (90% - 98%)    PHYSICAL EXAMINATION:    GENERAL: The patient is a well-developed, well-nourished _____in no apparent distress.     HEENT: Head is normocephalic and atraumatic. Extraocular muscles are intact. Mucous membranes are moist.     NECK: Supple.     LUNGS: Clear to auscultation without wheezing, rales, or rhonchi. Respirations unlabored    HEART: Regular rate and rhythm without murmur.    ABDOMEN: Soft, nontender, and nondistended.  No hepatosplenomegaly is noted.    EXTREMITIES: Without any cyanosis, clubbing, rash, lesions or edema.    NEUROLOGIC: Grossly intact.      LABS:                        16.4   8.39  )-----------( 317      ( 14 Aug 2020 08:08 )             49.0     08-14    137  |  96<L>  |  18.0  ----------------------------<  272<H>  4.5   |  23.0  |  0.45<L>    Ca    9.2      14 Aug 2020 08:08  Phos  2.8     08-14  Mg     2.3     08-14    TPro  7.3  /  Alb  3.3  /  TBili  0.4  /  DBili  0.1  /  AST  27  /  ALT  25  /  AlkPhos  68  08-14    PT/INR - ( 12 Aug 2020 17:42 )   PT: 13.3 sec;   INR: 1.15 ratio         PTT - ( 12 Aug 2020 17:42 )  PTT:33.1 sec  Urinalysis Basic - ( 13 Aug 2020 01:15 )    Color: Yellow / Appearance: Clear / S.015 / pH: x  Gluc: x / Ketone: Moderate  / Bili: Negative / Urobili: Negative mg/dL   Blood: x / Protein: 100 mg/dL / Nitrite: Negative   Leuk Esterase: Small / RBC: 6-10 /HPF / WBC 6-10   Sq Epi: x / Non Sq Epi: Occasional / Bacteria: Few      ABG - ( 12 Aug 2020 17:41 )  pH, Arterial: 7.41  pH, Blood: x     /  pCO2: 36    /  pO2: 65    / HCO3: 23    / Base Excess: -1.6  /  SaO2: 93            COVID-19 PCR . (20 @ 17:41)    COVID-19 PCR: Detected: Testing is performed using polymerase chain reaction (PCR) or  transcription mediated amplification (TMA). This COVID-19 (SARS-CoV-2)  nucleic acid amplification test was validated by [facility] and is  in use under the FDA Emergency Use Authorization (EUA) for clinical labs  CLIA-certified to perform high complexity testing. Test results should be  correlated with clinical presentation, patient history, and epidemiology.        CARDIAC MARKERS ( 13 Aug 2020 09:12 )  x     / <0.01 ng/mL / x     / x     / x      CARDIAC MARKERS ( 12 Aug 2020 17:42 )  x     / <0.01 ng/mL / x     / x     / x      D-Dimer Assay, Quantitative (20 @ 09:13)    D-Dimer Assay, Quantitative: <150 ng/mL DDU        Serum Pro-Brain Natriuretic Peptide: 35 pg/mL (20 @ 17:42)      Procalcitonin, Serum: 0.13 ng/mL (20 @ 09:12)  Procalcitonin, Serum: 0.15 ng/mL (20 @ 17:42)      MICROBIOLOGY:    RADIOLOGY & ADDITIONAL STUDIES:  < from: CT Angio Chest w/ IV Cont (20 @ 22:44) >   EXAM:  CT ANGIO CHEST (W)AW IC                          PROCEDURE DATE:  2020          INTERPRETATION:  CLINICAL INFORMATION: Chest pain, shortness of breath, hypoxia.    COMPARISON: None.    PROCEDURE:  CT Angiography of the Chest.  57 ml of Omnipaque 350 was injected intravenously.  Sagittal and coronal reformats were performed as well as 3D (MIP) reconstructions.    FINDINGS:    LUNGS AND AIRWAYS: Patent central airways.  Bilaterally asymmetric, peripheral predominant groundglass opacities are seen most pronounced lower lobes.  PLEURA: No pleural effusion.  MEDIASTINUM AND LUH: No lymphadenopathy. A tiny right paratracheal air cyst versus tracheal diverticulum is seen.  VESSELS: No intraluminal filling defect in the main, lobar, segmental pulmonary arteries, with limited evaluation of the subsegmental pulmonary arteries. Aortic root and arch are normal in caliber.  HEART: Heart size is normal. No pericardial effusion.  CHEST WALL AND LOWER NECK: Within normal limits.  VISUALIZED UPPER ABDOMEN: Probable diffuse hepatic steatosis.  BONES: Degenerative changes.    IMPRESSION:  No evidence of pulmonary embolism.    Extensive, bilaterally asymmetric and peripherally predominant groundglass opacities concerning for atypical infection, especially a viral etiology such as COVID-19. Alternative etiologies include diffuse alveolar hemorrhage, eosinophilic lung disease, or vasculitis.                BENNY ZOHRABIAN MD; Attending Radiologist  This document has been electronically signed. Aug 12 2020 11:17PM        < end of copied text >  All films reviewed on PACS

## 2020-08-14 NOTE — PROGRESS NOTE ADULT - ASSESSMENT
49 y/o M with PMHX HTN, HLD/Hypertriglyercidemia, DM2 BIBEMS to [facility] ER c/o SOB/MIGUEL, non-productive cough, fever/chills, associated with pleuritic CP which began 6 days ago and has progressively worsened. Patient seen in ER 8/8/20 and had COVID19 PCR screening and was rx'd Amoxicillin at that time. COVID19 PCR positive 8/8/20. Patient hypoxic on arrival 02sat noted 86%. Placed on 6L NC with moderate improvement but subsequently desatted with minimal exertion and was placed in prone position. Currently satting 97% on O2 via NC 6L in prone position. +Sick Contacts similar symptoms at home x2 Wife and Daughter. No tobacco or drug abuse. No other complaints. Denies current CP, HA/Dizziness/lightheadedness, focal deficits, n/v/d, abd pain, or urinary complaints. ROS negative unless mentioned above.     A/P:  Acute Hypoxemic Respiratory Failure 2/2 COVID19 PNA  -COVID19 PCR Positive 8/8/20  -CTA Chest negative for PE. +Extensive bilaterally asymmetric and peripherally     predominant groundglass opacities  -repeat labs/trend lymphocytopenia  -trend inflammatory markers  -f/u cultures pending  -monitor telemetry  -O2 via NC titrate for O2sat >90% and <96%- Pulmonary consult, will change to Vapotherm 40%, ABG ordered  -Self Prone Protocol q20 mins  -trend inflammatory markers  -Dexamethaxone 10mg IVP x1 in ER. Continue Decadron as per Pulmonary  -Tylenol PRN for Fever  -VTE PPX Lovenox 40mg subq BID due to pro-thrombotic state with COVID  -Zinc Sulfate 50mg PO q24 (home med)  -Isolation Precautions  -ID following, on Remdesivir    AGMA  -Repeat ABG  -Monitor BMP    Polycythemia  -Hgb 17.1. Trend CBC.     Pseudohyponatremia  -resolved    Hyperglycemia, DM2  -HGA1c 11.7  -BG elevated on steroids, discussed with Diabetic educator, increase Lantus and pre meal.     Transaminitis  -likely 2/2 Covid v diffuse hepatic steatosis seen on CT  -Trend LFTs  -hold Omega 3 Fatty Acids for now    CAD, HTN  -Continue Lisinopril 10mg PO q24   -Continue ASA 325mg PO Q24 51 y/o M with PMHX HTN, HLD/Hypertriglyercidemia, DM2 BIBEMS to [facility] ER c/o SOB/MIGUEL, non-productive cough, fever/chills, associated with pleuritic CP which began 6 days ago and has progressively worsened. Patient seen in ER 8/8/20 and had COVID19 PCR screening and was rx'd Amoxicillin at that time. COVID19 PCR positive 8/8/20. Patient hypoxic on arrival 02sat noted 86%. Placed on 6L NC with moderate improvement but subsequently desatted with minimal exertion and was placed in prone position. Currently satting 97% on O2 via NC 6L in prone position. +Sick Contacts similar symptoms at home x2 Wife and Daughter. No tobacco or drug abuse. No other complaints. Denies current CP, HA/Dizziness/lightheadedness, focal deficits, n/v/d, abd pain, or urinary complaints. ROS negative unless mentioned above.     A/P:  Acute Hypoxemic Respiratory Failure 2/2 COVID19 PNA  -COVID19 PCR Positive 8/8/20  -CTA Chest negative for PE. +Extensive bilaterally asymmetric and peripherally     predominant groundglass opacities  -repeat labs/trend lymphocytopenia  -trend inflammatory markers  -f/u cultures pending  -monitor telemetry  -O2 via NC titrate for O2sat >90% and <96%. Oxygen saturation 88-91% on 50% VM. Pulmonary consult, will change to Vapotherm 40%, ABG ordered  D-dimer < 150  -Self Prone Protocol q20 mins  -trend inflammatory markers. CRP trending down  -ID following, on Remdesivir day 2/5  Increased dose of steroids as per ID recs. Initially was on Decadron 6mg IV daily without improvement. -Tylenol PRN for Fever  -VTE PPX Lovenox 40mg subq BID due to pro-thrombotic state with COVID  -Zinc Sulfate 220mg PO q24, Vitamin c, thiamine for suspected thiamine def  -Isolation Precautions      AGMA  -Repeat ABG  -Monitor BMP    Polycythemia  -Hgb 17.1. Trend CBC.   s/p IVF    Pseudohyponatremia  -resolved    Hyperglycemia, DM2 uncontrolled  -HGA1c 11.7  -BG elevated on steroids, discussed with Diabetic educator, increase  to Lantus 25 and pre meal 7.   Adjust insulin as per BG levels    Transaminitis  -likely 2/2 Covid v diffuse hepatic steatosis seen on CT  -Trend LFTs  -hold Omega 3 Fatty Acids for now    CAD, HTN  -Continue Lisinopril 10mg PO q24   -Continue ASA 325mg PO Q24    DVT ppx

## 2020-08-14 NOTE — CONSULT NOTE ADULT - ASSESSMENT
Covid 19 infection with diffuse pneumonia and hypoxic resp failure    Plan:  Vapotherm HiFlo NCO  dexamethasone  remdesivir  LMWH  f/u d-dimer, ferritin

## 2020-08-14 NOTE — PROGRESS NOTE ADULT - SUBJECTIVE AND OBJECTIVE BOX
CC: Acute Hypoxemic Respiratory Failure, COVID19 PNA/Hyperglycemia    INTERVAL HPI/OVERNIGHT EVENTS: Patient seen and examined. Oxygen saturation 88-91% on 50% VM, with meal required additional 6 liters nasal canula to maintain sat above 90%. C/O poor appetite, fatigue. Denies chest pain, +MIGUEL. No nausea, vomiting, fever, chills.     Vital Signs Last 24 Hrs  T(C): 37.2 (14 Aug 2020 08:30), Max: 37.2 (14 Aug 2020 08:30)  T(F): 99 (14 Aug 2020 08:30), Max: 99 (14 Aug 2020 08:30)  HR: 100 (14 Aug 2020 08:30) (79 - 100)  BP: 123/75 (14 Aug 2020 08:30) (111/65 - 124/78)  BP(mean): --  RR: 19 (14 Aug 2020 08:30) (18 - 20)  SpO2: 92% (14 Aug 2020 05:23) (90% - 98%)    PHYSICAL EXAM-  GENERAL: NAD  HEAD:  Atraumatic, Normocephalic  EYES: EOMI, PERRLA, conjunctiva and sclera clear  NECK: Supple, No JVD  NERVOUS SYSTEM:  Alert & Oriented X3, Motor Strength 5/5 B/L upper and lower extremities; DTRs 2+ intact and symmetric  CHEST/LUNG: decreased BS b/l  HEART: Regular rate and rhythm; No murmurs, rubs, or gallops  ABDOMEN: Soft, Nontender, Nondistended; Bowel sounds present  EXTREMITIES:  2+ Peripheral Pulses, No clubbing, cyanosis, or edema  SKIN: No rashes or lesions.      I&O's Detail      CARDIAC MARKERS ( 13 Aug 2020 09:12 )  x     / <0.01 ng/mL / x     / x     / x      CARDIAC MARKERS ( 12 Aug 2020 17:42 )  x     / <0.01 ng/mL / x     / x     / x                                16.4   8.39  )-----------( 317      ( 14 Aug 2020 08:08 )             49.0     14 Aug 2020 08:08    137    |  96     |  18.0   ----------------------------<  272    4.5     |  23.0   |  0.45     Ca    9.2        14 Aug 2020 08:08  Phos  2.8       14 Aug 2020 08:08  Mg     2.3       14 Aug 2020 08:08    TPro  7.3    /  Alb  3.3    /  TBili  0.4    /  DBili  0.1    /  AST  27     /  ALT  25     /  AlkPhos  68     14 Aug 2020 08:08    PT/INR - ( 12 Aug 2020 17:42 )   PT: 13.3 sec;   INR: 1.15 ratio         PTT - ( 12 Aug 2020 17:42 )  PTT:33.1 sec  CAPILLARY BLOOD GLUCOSE      POCT Blood Glucose.: 322 mg/dL (14 Aug 2020 08:12)  POCT Blood Glucose.: 233 mg/dL (13 Aug 2020 21:29)  POCT Blood Glucose.: 329 mg/dL (13 Aug 2020 17:54)  POCT Blood Glucose.: 384 mg/dL (13 Aug 2020 12:04)    LIVER FUNCTIONS - ( 14 Aug 2020 08:08 )  Alb: 3.3 g/dL / Pro: 7.3 g/dL / ALK PHOS: 68 U/L / ALT: 25 U/L / AST: 27 U/L / GGT: x           Urinalysis Basic - ( 13 Aug 2020 01:15 )    Color: Yellow / Appearance: Clear / S.015 / pH: x  Gluc: x / Ketone: Moderate  / Bili: Negative / Urobili: Negative mg/dL   Blood: x / Protein: 100 mg/dL / Nitrite: Negative   Leuk Esterase: Small / RBC: 6-10 /HPF / WBC 6-10   Sq Epi: x / Non Sq Epi: Occasional / Bacteria: Few        MEDICATIONS  (STANDING):  ascorbic acid 1500 milliGRAM(s) Oral daily  aspirin enteric coated 325 milliGRAM(s) Oral daily  dextrose 5%. 1000 milliLiter(s) (50 mL/Hr) IV Continuous <Continuous>  dextrose 50% Injectable 12.5 Gram(s) IV Push once  dextrose 50% Injectable 25 Gram(s) IV Push once  dextrose 50% Injectable 25 Gram(s) IV Push once  enoxaparin Injectable 40 milliGRAM(s) SubCutaneous every 12 hours  insulin glargine Injectable (LANTUS) 30 Unit(s) SubCutaneous at bedtime  insulin lispro (HumaLOG) corrective regimen sliding scale   SubCutaneous Before meals and at bedtime  insulin lispro Injectable (HumaLOG) 9 Unit(s) SubCutaneous three times a day with meals  lisinopril 10 milliGRAM(s) Oral daily  methylPREDNISolone sodium succinate Injectable 125 milliGRAM(s) IV Push once  remdesivir  IVPB 100 milliGRAM(s) IV Intermittent every 24 hours  remdesivir  IVPB   IV Intermittent   thiamine 100 milliGRAM(s) Oral daily  zinc sulfate 220 milliGRAM(s) Oral daily    MEDICATIONS  (PRN):  acetaminophen   Tablet .. 650 milliGRAM(s) Oral every 4 hours PRN Temp greater or equal to 38.5C (101.3F)  acetaminophen  Suppository .. 650 milliGRAM(s) Rectal every 4 hours PRN Temp greater or equal to 38C (100.4F)  ALBUTerol    90 MICROgram(s) HFA Inhaler 2 Puff(s) Inhalation every 4 hours PRN Shortness of Breath and/or Wheezing  dextrose 40% Gel 15 Gram(s) Oral once PRN Blood Glucose LESS THAN 70 milliGRAM(s)/deciliter  glucagon  Injectable 1 milliGRAM(s) IntraMuscular once PRN Glucose LESS THAN 70 milligrams/deciliter  ondansetron Injectable 4 milliGRAM(s) IV Push every 6 hours PRN Nausea and/or Vomiting      RADIOLOGY & ADDITIONAL TESTS:

## 2020-08-15 NOTE — PROGRESS NOTE ADULT - ASSESSMENT
51 y/o M with PMHX HTN, HLD/Hypertriglyercidemia, DM2 BIBEMS to [facility] ER c/o SOB/MIGUEL, non-productive cough, fever/chills, associated with pleuritic CP which began 6 days ago and has progressively worsened. Patient seen in ER 8/8/20 and had COVID19 PCR screening and was rx'd Amoxicillin at that time. COVID19 PCR positive 8/8/20. Patient hypoxic on arrival 02sat noted 86%. Placed on 6L NC with moderate improvement but subsequently desatted with minimal exertion and was placed in prone position. Currently satting 97% on O2 via NC 6L in prone position. +Sick Contacts similar symptoms at home x2 Wife and Daughter. No tobacco or drug abuse. No other complaints. Denies current CP, HA/Dizziness/lightheadedness, focal deficits, n/v/d, abd pain, or urinary complaints. ROS negative unless mentioned above.     A/P:  Acute Hypoxemic Respiratory Failure 2/2 COVID19 PNA  -COVID19 PCR Positive 8/8/20  -CTA Chest negative for PE. +Extensive bilaterally asymmetric and peripherally, predominant ground glass opacities  -repeat labs/trend lymphocytopenia  -trend inflammatory markers   -blood cultures negative  -monitor telemetry  -O2 via NC titrate for O2sat >90% and <96%. Oxygen saturation 88-91% on 50% VM. Pulmonary consult, will change to Vapotherm 40%, ABG ordered  D-dimer < 150  -Self Prone Protocol q20 mins  -trend inflammatory markers. CRP trending down  -ID following, on Remdesivir day 3/5  Increased dose of steroids as per ID recs. Initially was on Decadron 6mg IV daily without improvement, currently patient is on solumderole 60mg [id number], -Tylenol PRN for Fever  -VTE PPX Lovenox 40mg subq BID due to pro-thrombotic state with COVID  -Zinc Sulfate 220mg PO q24, Vitamin c, thiamine for suspected thiamine def  -Isolation Precautions      AGMA  -Repeat ABG  -Monitor BMP    Polycythemia  -Hgb 17.1. Trend CBC, Hb is 16.1 today   s/p IVF    Pseudohyponatremia  -resolved    Hyperglycemia, DM2 uncontrolled  -HGA1c 11.7  -BG elevated on steroids, discussed with Diabetic educator, increase  to Lantus 25 and pre meal 7.   Adjust insulin as per BG levels    Transaminitis  -likely 2/2 Covid v diffuse hepatic steatosis seen on CT  -Trend LFTs  -hold Omega 3 Fatty Acids for now    CAD, HTN  -Continue Lisinopril 10mg PO q24   -Continue ASA 325mg PO Q24    DVT ppx.     Dispo: Once Hypoxemia is better and weaned off supplemental oxygen, likely 48 to 72h

## 2020-08-15 NOTE — PROGRESS NOTE ADULT - SUBJECTIVE AND OBJECTIVE BOX
BOUCHRA HERNANDEZ    [id number]    50y      Male    Patient is a 50y old  Male who presents with a chief complaint of Acute Hypoxemic Respiratory Failure, COVID19 PNA (15 Aug 2020 09:24)      INTERVAL HPI/OVERNIGHT EVENTS:    Patient is feeling little improvement of his SOB, has some dry cough, denies fever, chills, chest pain, he is currently on high flow Vapotherm     REVIEW OF SYSTEMS:    CONSTITUTIONAL: No fever, has fatigue  RESPIRATORY: has cough, improving shortness of breath  CARDIOVASCULAR: No chest pain, palpitations  GASTROINTESTINAL: No abdominal, No nausea, vomiting  NEUROLOGICAL: No headaches,  loss of strength.  MISCELLANEOUS: No joint swelling or pain       Vital Signs Last 24 Hrs  T(C): 37.1 (15 Aug 2020 08:50), Max: 37.2 (15 Aug 2020 00:10)  T(F): 98.7 (15 Aug 2020 08:50), Max: 98.9 (15 Aug 2020 00:10)  HR: 90 (15 Aug 2020 08:50) (60 - 90)  BP: 119/78 (15 Aug 2020 08:50) (105/65 - 120/75)  RR: 22 (15 Aug 2020 08:50) (19 - 24)  SpO2: 90% (15 Aug 2020 09:43) (90% - 100%)    PHYSICAL EXAM:    GENERAL: Middle age male looking in mild respiratory    HEENT: PERRL, +EOMI  NECK: soft, Supple, No JVD,   CHEST/LUNG: Decrease air entry bilaterally; No wheezing  HEART: S1S2+, Regular rate and rhythm; No murmurs  ABDOMEN: Soft, Nontender, Nondistended; Bowel sounds present  EXTREMITIES:  2+ Peripheral Pulses, No edema  SKIN: No rashes or lesions  NEURO: AAOX3, no focal deficits, no motor r sensory loss  PSYCH: normal mood      LABS:                        16.1   8.79  )-----------( 388      ( 15 Aug 2020 09:22 )             48.0     08-15    136  |  95<L>  |  23.0<H>  ----------------------------<  346<H>  4.3   |  24.0  |  0.58    Ca    8.8      15 Aug 2020 09:22  Phos  3.9     08-15  Mg     2.5     08-15    TPro  6.9  /  Alb  3.1<L>  /  TBili  0.3<L>  /  DBili  0.1  /  AST  16  /  ALT  22  /  AlkPhos  66  08-15            I&O's Summary      MEDICATIONS  (STANDING):  ascorbic acid 1500 milliGRAM(s) Oral daily  aspirin enteric coated 325 milliGRAM(s) Oral daily  dextrose 5%. 1000 milliLiter(s) (50 mL/Hr) IV Continuous <Continuous>  dextrose 50% Injectable 12.5 Gram(s) IV Push once  dextrose 50% Injectable 25 Gram(s) IV Push once  dextrose 50% Injectable 25 Gram(s) IV Push once  enoxaparin Injectable 40 milliGRAM(s) SubCutaneous every 12 hours  insulin glargine Injectable (LANTUS) 25 Unit(s) SubCutaneous at bedtime  insulin lispro (HumaLOG) corrective regimen sliding scale   SubCutaneous Before meals and at bedtime  insulin lispro Injectable (HumaLOG) 7 Unit(s) SubCutaneous three times a day before meals  lisinopril 10 milliGRAM(s) Oral daily  methylPREDNISolone sodium succinate Injectable 60 milliGRAM(s) IV Push every 8 hours  remdesivir  IVPB 100 milliGRAM(s) IV Intermittent every 24 hours  remdesivir  IVPB   IV Intermittent   thiamine 100 milliGRAM(s) Oral daily  zinc sulfate 220 milliGRAM(s) Oral daily    MEDICATIONS  (PRN):  acetaminophen   Tablet .. 650 milliGRAM(s) Oral every 4 hours PRN Temp greater or equal to 38.5C (101.3F)  acetaminophen  Suppository .. 650 milliGRAM(s) Rectal every 4 hours PRN Temp greater or equal to 38C (100.4F)  ALBUTerol    90 MICROgram(s) HFA Inhaler 2 Puff(s) Inhalation every 4 hours PRN Shortness of Breath and/or Wheezing  dextrose 40% Gel 15 Gram(s) Oral once PRN Blood Glucose LESS THAN 70 milliGRAM(s)/deciliter  glucagon  Injectable 1 milliGRAM(s) IntraMuscular once PRN Glucose LESS THAN 70 milligrams/deciliter  ondansetron Injectable 4 milliGRAM(s) IV Push every 6 hours PRN Nausea and/or Vomiting

## 2020-08-15 NOTE — PROGRESS NOTE ADULT - SUBJECTIVE AND OBJECTIVE BOX
[facility]  INFECTIOUS DISEASES AND INTERNAL MEDICINE at [place]  =======================================================  Quinn Rivera MD  Diplomates American Board of Internal Medicine and Infectious Diseases  Tel  [phone]  Fax [phone]  =======================================================    [facility]-[id number]  BOUCHRA HERNANDEZ   follow up for:  COVID-19 pneumonia  patient seen and examined.     reports cough and difficulty breathing      I have personally reviewed the labs and data; pertinent labs and data are listed in this note; please see below.   ===================================================  REVIEW OF SYSTEMS:  CONSTITUTIONAL:  No Fever or chills  HEENT:  No diplopia or blurred vision.  No earache, sore throat or runny nose.  CARDIOVASCULAR:  No pressure, squeezing, strangling, tightness, heaviness or aching about the chest, neck, axilla or epigastrium.  RESPIRATORY:  POSITIVE cough, shortness of breath  GASTROINTESTINAL:  No nausea, vomiting or diarrhea.  GENITOURINARY:  No dysuria, frequency or urgency. No Blood in urine  MUSCULOSKELETAL:  no joint aches, no muscle pain  SKIN:  No change in skin, hair or nails.  NEUROLOGIC:  No Headaches, seizures or weakness.  PSYCHIATRIC:  No disorder of thought or mood.  ENDOCRINE:  No heat or cold intolerance  HEMATOLOGICAL:  No easy bruising or bleeding.   =======================================================  Allergies  No Known Allergies    Antibiotics:  remdesivir  IVPB   IV Intermittent   remdesivir  IVPB 100 milliGRAM(s) IV Intermittent every 24 hours    Other medications:  ascorbic acid 1500 milliGRAM(s) Oral daily  aspirin enteric coated 325 milliGRAM(s) Oral daily  dexAMETHasone  Injectable 6 milliGRAM(s) IV Push daily  dextrose 5%. 1000 milliLiter(s) IV Continuous <Continuous>  dextrose 50% Injectable 12.5 Gram(s) IV Push once  dextrose 50% Injectable 25 Gram(s) IV Push once  dextrose 50% Injectable 25 Gram(s) IV Push once  enoxaparin Injectable 40 milliGRAM(s) SubCutaneous every 12 hours  insulin glargine Injectable (LANTUS) 15 Unit(s) SubCutaneous at bedtime  insulin lispro (HumaLOG) corrective regimen sliding scale   SubCutaneous Before meals and at bedtime  insulin lispro Injectable (HumaLOG) 4 Unit(s) SubCutaneous three times a day with meals  lisinopril 10 milliGRAM(s) Oral daily  thiamine 100 milliGRAM(s) Oral daily  zinc sulfate 220 milliGRAM(s) Oral daily    ======================================================  Physical Exam:  ============   Vital Signs Last 24 Hrs  T(C): 36.4 (15 Aug 2020 16:57), Max: 37.2 (15 Aug 2020 00:10)  T(F): 97.6 (15 Aug 2020 16:57), Max: 98.9 (15 Aug 2020 00:10)  HR: 80 (15 Aug 2020 16:57) (60 - 90)  BP: 126/80 (15 Aug 2020 16:57) (105/65 - 126/80)  BP(mean): --  RR: 23 (15 Aug 2020 16:57) (19 - 25)  SpO2: 94% (15 Aug 2020 16:57) (90% - 100%)    General:  No acute distress.  Eye: Pupils are equal, round and reactive to light, Extraocular movements are intact, Normal conjunctiva.  HENT: Normocephalic, Oral mucosa is moist, No pharyngeal erythema, No sinus tenderness.  Neck: Supple, No lymphadenopathy.  Respiratory: Lungs with COARSE breath sounds  Cardiovascular: Normal rate, Regular rhythm,    Gastrointestinal: Soft, Non-tender, Non-distended, Normal bowel sounds.  Genitourinary: No costovertebral angle tenderness.  Lymphatics: No lymphadenopathy neck,   Musculoskeletal: Normal range of motion, Normal strength.  Integumentary: No rash.  Neurologic: Alert, Oriented, No focal deficits, Cranial Nerves II-XII are grossly intact.  Psychiatric: Appropriate mood & affect.  =======================================================      Labs:                                              16.1   8.79  )-----------( 388      ( 15 Aug 2020 09:22 )             48.0   08-15    136  |  95<L>  |  23.0<H>  ----------------------------<  346<H>  4.3   |  24.0  |  0.58    Ca    8.8      15 Aug 2020 09:22  Phos  3.9     08-15  Mg     2.5     08-15    TPro  6.9  /  Alb  3.1<L>  /  TBili  0.3<L>  /  DBili  0.1  /  AST  16  /  ALT  22  /  AlkPhos  66  08-15      Culture - Urine (collected 08-13-20 @ 05:39)  Source: .Urine Clean Catch (Midstream)  Final Report (08-14-20 @ 06:38):    <10,000 CFU/mL Normal Urogenital Karolina      Creatinine, Serum: 0.49 mg/dL (08-13-20 @ 17:52)  Creatinine, Serum: 0.52 mg/dL (08-13-20 @ 09:12)  Creatinine, Serum: 0.69 mg/dL (08-12-20 @ 17:42)    Procalcitonin, Serum: 0.13 ng/mL (08-13-20 @ 09:12)  Procalcitonin, Serum: 0.15 ng/mL (08-12-20 @ 17:42)    D-Dimer Assay, Quantitative: <150 ng/mL DDU (08-13-20 @ 09:13)  D-Dimer Assay, Quantitative: <150 ng/mL DDU (08-12-20 @ 17:42)    Ferritin, Serum: 1391 ng/mL (08-12-20 @ 17:42)    C-Reactive Protein, Serum: 13.83 mg/dL (08-13-20 @ 09:12)  C-Reactive Protein, Serum: 12.22 mg/dL (08-12-20 @ 17:42)    WBC Count: 8.39 K/uL (08-14-20 @ 08:08)  WBC Count: 5.65 K/uL (08-13-20 @ 09:13)  WBC Count: 4.16 K/uL (08-12-20 @ 17:42)      COVID-19 PCR: Detected (08-12-20 @ 17:41)  COVID-19 PCR: Detected (08-08-20 @ 13:29)    Lactate Dehydrogenase, Serum: 428 U/L (08-13-20 @ 09:12)    Alkaline Phosphatase, Serum: 69 U/L (08-13-20 @ 17:52)  Alkaline Phosphatase, Serum: 68 U/L (08-13-20 @ 09:12)  Alkaline Phosphatase, Serum: 73 U/L (08-12-20 @ 17:42)  Alanine Aminotransferase (ALT/SGPT): 31 U/L (08-13-20 @ 17:52)  Alanine Aminotransferase (ALT/SGPT): 30 U/L (08-13-20 @ 09:12)  Alanine Aminotransferase (ALT/SGPT): 37 U/L (08-12-20 @ 17:42)  Aspartate Aminotransferase (AST/SGOT): 32 U/L (08-13-20 @ 17:52)  Aspartate Aminotransferase (AST/SGOT): 31 U/L (08-13-20 @ 09:12)  Aspartate Aminotransferase (AST/SGOT): 44 U/L (08-12-20 @ 17:42)  Bilirubin Total, Serum: 0.2 mg/dL (08-13-20 @ 17:52)  Bilirubin Total, Serum: 0.3 mg/dL (08-13-20 @ 09:12)  Bilirubin Total, Serum: 0.3 mg/dL (08-12-20 @ 17:42)  Bilirubin Direct, Serum: 0.1 mg/dL (08-13-20 @ 17:52)

## 2020-08-15 NOTE — PROGRESS NOTE ADULT - ASSESSMENT
51 y/o M with PMHX HTN, HLD/Hypertriglyercidemia, DM2 BIBEMS to [facility] ER c/o SOB/MIGUEL, non-productive cough, fever/chills, associated with pleuritic CP which began 6 days ago and has progressively worsened. Patient seen in ER 8/8/20 and had COVID19 PCR screening and was rx'd Amoxicillin at that time. COVID19 PCR positive 8/8/20. Patient hypoxic on arrival 02sat noted 86%. Placed on 6L NC with moderate improvement but subsequently desatted with minimal exertion and was placed in prone position. Currently satting 97% on O2 via NC 6L in prone position. +Sick Contacts similar symptoms at home x2 Wife and Daughter.     COVID 19 pneumonia  Acute hypoxic respiratory failure  Fever  Uncontrolled DM      - inflammatory markers elevated  - CTA extensive b/l GGO-no PE     - Continue Remdesivir started under EUA  -   Continue steroids  -    - Data regarding treatment of COVID 19 continues to evolve. Optimized supportive care remains the mainstay of therapy  - Avoid antibiotics unless there is a concern for a bacterial infection  - VTE prophylaxis per current [facility] system COVID 19 protocol  - Check CBC with diff, CMP with LFT's, Inflammatory markers (ESR, CRP, Ferritin, LDH,  procalcitonin)  q48h.  D dimer, lactate, troponin, CK, PT/PTT, type and screen x 1  - Encourage self proning q2h and ambulation as tolerated  - Taper off O2 as tolerated- once tolerating room air would ideally monitor for 24h prior to discharge. Continue self quarantine at home x 14 days from date of positive COVID 19 PCR  - Inpatient Contact/Airborne isolation   OVERALL HE STATES HE FEELS BETTER STILL ON HI FLOW

## 2020-08-15 NOTE — PROGRESS NOTE ADULT - ASSESSMENT
Covid 19 infection with diffuse pneumonia and hypoxic resp failure  O2 requirements high but tolerating    Plan:  Vapotherm HiFlo NCO  dexamethasone  remdesivir  LMWH  f/u d-dimer, ferritin   mobilize

## 2020-08-15 NOTE — PROGRESS NOTE ADULT - SUBJECTIVE AND OBJECTIVE BOX
PULMONARY PROGRESS NOTE      MARILIN HERNANDEZANUJA-[id number]    Patient is a 50y old  Male who presents with a chief complaint of Acute Hypoxemic Respiratory Failure, COVID19 PNA (14 Aug 2020 11:39)      INTERVAL HPI/OVERNIGHT EVENTS:  Feeling better but on 100% vapotherm at 40lpm  Mild cough and chest heaviness    MEDICATIONS  (STANDING):  ascorbic acid 1500 milliGRAM(s) Oral daily  aspirin enteric coated 325 milliGRAM(s) Oral daily  dextrose 5%. 1000 milliLiter(s) (50 mL/Hr) IV Continuous <Continuous>  dextrose 50% Injectable 12.5 Gram(s) IV Push once  dextrose 50% Injectable 25 Gram(s) IV Push once  dextrose 50% Injectable 25 Gram(s) IV Push once  enoxaparin Injectable 40 milliGRAM(s) SubCutaneous every 12 hours  insulin glargine Injectable (LANTUS) 25 Unit(s) SubCutaneous at bedtime  insulin lispro (HumaLOG) corrective regimen sliding scale   SubCutaneous Before meals and at bedtime  insulin lispro Injectable (HumaLOG) 7 Unit(s) SubCutaneous three times a day before meals  lisinopril 10 milliGRAM(s) Oral daily  methylPREDNISolone sodium succinate Injectable 60 milliGRAM(s) IV Push every 8 hours  remdesivir  IVPB 100 milliGRAM(s) IV Intermittent every 24 hours  remdesivir  IVPB   IV Intermittent   thiamine 100 milliGRAM(s) Oral daily  zinc sulfate 220 milliGRAM(s) Oral daily      MEDICATIONS  (PRN):  acetaminophen   Tablet .. 650 milliGRAM(s) Oral every 4 hours PRN Temp greater or equal to 38.5C (101.3F)  acetaminophen  Suppository .. 650 milliGRAM(s) Rectal every 4 hours PRN Temp greater or equal to 38C (100.4F)  ALBUTerol    90 MICROgram(s) HFA Inhaler 2 Puff(s) Inhalation every 4 hours PRN Shortness of Breath and/or Wheezing  dextrose 40% Gel 15 Gram(s) Oral once PRN Blood Glucose LESS THAN 70 milliGRAM(s)/deciliter  glucagon  Injectable 1 milliGRAM(s) IntraMuscular once PRN Glucose LESS THAN 70 milligrams/deciliter  ondansetron Injectable 4 milliGRAM(s) IV Push every 6 hours PRN Nausea and/or Vomiting      Allergies    No Known Allergies    Intolerances        PAST MEDICAL & SURGICAL HISTORY:  DM (diabetes mellitus)  Hypertriglyceridemia  Hypertension  No significant past surgical history      SOCIAL HISTORY  Smoking History:       REVIEW OF SYSTEMS:    CONSTITUTIONAL:  No distress    HEENT:  Eyes:  No diplopia or blurred vision. ENT:  No earache, sore throat or runny nose.    CARDIOVASCULAR:  No pressure, squeezing, tightness, heaviness or aching about the chest; no palpitations.    RESPIRATORY: above    GASTROINTESTINAL:  No nausea, vomiting or diarrhea.    GENITOURINARY:  No dysuria, frequency or urgency.    NEUROLOGIC:  No paresthesias, fasciculations, seizures or weakness.    Extremities: No cyanosis, clubbing or edema    PSYCHIATRIC:  No disorder of thought or mood.    Vital Signs Last 24 Hrs  T(C): 37.2 (15 Aug 2020 00:10), Max: 37.2 (15 Aug 2020 00:10)  T(F): 98.9 (15 Aug 2020 00:10), Max: 98.9 (15 Aug 2020 00:10)  HR: 60 (15 Aug 2020 07:55) (60 - 84)  BP: 108/67 (15 Aug 2020 05:25) (105/65 - 120/75)  BP(mean): --  RR: 24 (15 Aug 2020 07:55) (19 - 24)  SpO2: 95% (15 Aug 2020 07:55) (91% - 100%)    PHYSICAL EXAMINATION:    GENERAL: The patient is awake and alert in no apparent distress.     HEENT: Head is normocephalic and atraumatic. Extraocular muscles are intact. Mucous membranes are moist.    NECK: Supple.    LUNGS: Clear to auscultation without wheezing, rales or rhonchi; respirations unlabored    HEART: Regular rate and rhythm without murmur.    ABDOMEN: Soft, nontender, and nondistended.      EXTREMITIES: Without any cyanosis, clubbing, rash, lesions or edema.    NEUROLOGIC: Grossly intact.    LABS:                        16.4   8.39  )-----------( 317      ( 14 Aug 2020 08:08 )             49.0     08-14    137  |  96<L>  |  18.0  ----------------------------<  272<H>  4.5   |  23.0  |  0.45<L>    Ca    9.2      14 Aug 2020 08:08  Phos  2.8     08-14  Mg     2.3     08-14    TPro  7.3  /  Alb  3.3  /  TBili  0.4  /  DBili  0.1  /  AST  27  /  ALT  25  /  AlkPhos  68  08-14        ABG - ( 14 Aug 2020 11:56 )  pH, Arterial: 7.46  pH, Blood: x     /  pCO2: 37    /  pO2: 48    / HCO3: 26    / Base Excess: 2.1   /  SaO2: 86              D-Dimer Assay, Quantitative (08.13.20 @ 09:13)    D-Dimer Assay, Quantitative: <150 ng/mL DDU            Serum Pro-Brain Natriuretic Peptide: 35 pg/mL (08-12-20 @ 17:42)      Procalcitonin, Serum: 0.10 ng/mL (08-14-20 @ 12:01)  Procalcitonin, Serum: 0.13 ng/mL (08-13-20 @ 09:12)  Procalcitonin, Serum: 0.15 ng/mL (08-12-20 @ 17:42)      MICROBIOLOGY:    RADIOLOGY & ADDITIONAL STUDIES:  All films reviewed on PACS

## 2020-08-16 NOTE — PROGRESS NOTE ADULT - ASSESSMENT
Covid 19 infection with diffuse pneumonia and hypoxic resp failure  O2 requirements high but tolerating  Little clinical change    Plan:  Vapotherm HiFlo NCO  dexamethasone  remdesivir  LMWH  f/u d-dimer, ferritin etc   mobilize

## 2020-08-16 NOTE — PROGRESS NOTE ADULT - SUBJECTIVE AND OBJECTIVE BOX
BOUCHRA HERNANDEZ    [id number]    50y      Male    Patient is a 50y old  Male who presents with a chief complaint of Acute Hypoxemic Respiratory Failure, COVID19 PNA (16 Aug 2020 09:44)      INTERVAL HPI/OVERNIGHT EVENTS:      Patient is feeling little improvement of his SOB, denies fever, chills, chest pain, he is currently on high flow Vapotherm     REVIEW OF SYSTEMS:    CONSTITUTIONAL: No fever, has fatigue  RESPIRATORY: has cough, improving shortness of breath  CARDIOVASCULAR: No chest pain, palpitations  GASTROINTESTINAL: No abdominal, No nausea, vomiting  NEUROLOGICAL: No headaches,  loss of strength.  MISCELLANEOUS: No joint swelling or pain        Vital Signs Last 24 Hrs  T(C): 37.1 (16 Aug 2020 08:20), Max: 37.1 (16 Aug 2020 08:20)  T(F): 98.7 (16 Aug 2020 08:20), Max: 98.7 (16 Aug 2020 08:20)  HR: 75 (16 Aug 2020 08:20) (53 - 85)  BP: 144/90 (16 Aug 2020 08:20) (103/60 - 144/90)  RR: 24 (16 Aug 2020 08:20) (22 - 25)  SpO2: 92% (16 Aug 2020 09:08) (90% - 97%)    PHYSICAL EXAM:    GENERAL: Middle age male looking comfortable   HEENT: PERRL, +EOMI  NECK: soft, Supple, No JVD,   CHEST/LUNG: Decrease air entry bilaterally; No wheezing  HEART: S1S2+, Regular rate and rhythm; No murmurs  ABDOMEN: Soft, Nontender, Nondistended; Bowel sounds present  EXTREMITIES:  2+ Peripheral Pulses, No edema  SKIN: No rashes or lesions  NEURO: AAOX3, no focal deficits, no motor r sensory loss  PSYCH: normal mood      LABS:                        16.1   8.79  )-----------( 388      ( 15 Aug 2020 09:22 )             48.0     08-16    x   |  x   |  x   ----------------------------<  x   x    |  x   |  0.54    Ca    8.8      15 Aug 2020 09:22  Phos  3.9     08-15  Mg     2.5     08-15    TPro  6.5<L>  /  Alb  2.9<L>  /  TBili  0.4  /  DBili  0.1  /  AST  16  /  ALT  20  /  AlkPhos  67  08-16            I&O's Summary      MEDICATIONS  (STANDING):  ascorbic acid 1500 milliGRAM(s) Oral daily  aspirin enteric coated 325 milliGRAM(s) Oral daily  dextrose 5%. 1000 milliLiter(s) (50 mL/Hr) IV Continuous <Continuous>  dextrose 50% Injectable 12.5 Gram(s) IV Push once  dextrose 50% Injectable 25 Gram(s) IV Push once  dextrose 50% Injectable 25 Gram(s) IV Push once  enoxaparin Injectable 40 milliGRAM(s) SubCutaneous every 12 hours  insulin glargine Injectable (LANTUS) 25 Unit(s) SubCutaneous at bedtime  insulin lispro (HumaLOG) corrective regimen sliding scale   SubCutaneous Before meals and at bedtime  insulin lispro Injectable (HumaLOG) 7 Unit(s) SubCutaneous three times a day before meals  lisinopril 10 milliGRAM(s) Oral daily  methylPREDNISolone sodium succinate Injectable 60 milliGRAM(s) IV Push every 8 hours  remdesivir  IVPB 100 milliGRAM(s) IV Intermittent every 24 hours  remdesivir  IVPB   IV Intermittent   thiamine 100 milliGRAM(s) Oral daily  zinc sulfate 220 milliGRAM(s) Oral daily    MEDICATIONS  (PRN):  acetaminophen   Tablet .. 650 milliGRAM(s) Oral every 4 hours PRN Temp greater or equal to 38.5C (101.3F)  acetaminophen  Suppository .. 650 milliGRAM(s) Rectal every 4 hours PRN Temp greater or equal to 38C (100.4F)  ALBUTerol    90 MICROgram(s) HFA Inhaler 2 Puff(s) Inhalation every 4 hours PRN Shortness of Breath and/or Wheezing  dextrose 40% Gel 15 Gram(s) Oral once PRN Blood Glucose LESS THAN 70 milliGRAM(s)/deciliter  glucagon  Injectable 1 milliGRAM(s) IntraMuscular once PRN Glucose LESS THAN 70 milligrams/deciliter  ondansetron Injectable 4 milliGRAM(s) IV Push every 6 hours PRN Nausea and/or Vomiting

## 2020-08-16 NOTE — PROGRESS NOTE ADULT - SUBJECTIVE AND OBJECTIVE BOX
PULMONARY PROGRESS NOTE      MARILIN HERNANDEZANUJA-[id number]    Patient is a 50y old  Male who presents with a chief complaint of Acute Hypoxemic Respiratory Failure, COVID19 PNA (15 Aug 2020 17:11)      INTERVAL HPI/OVERNIGHT EVENTS:  Awake alert on 100% VAPOTHERM AND 40LPM NCO  complains of mild chest heaviness  positioning and eating  Day 4 remdisivir and steroids      MEDICATIONS  (STANDING):  ascorbic acid 1500 milliGRAM(s) Oral daily  aspirin enteric coated 325 milliGRAM(s) Oral daily  dextrose 5%. 1000 milliLiter(s) (50 mL/Hr) IV Continuous <Continuous>  dextrose 50% Injectable 12.5 Gram(s) IV Push once  dextrose 50% Injectable 25 Gram(s) IV Push once  dextrose 50% Injectable 25 Gram(s) IV Push once  enoxaparin Injectable 40 milliGRAM(s) SubCutaneous every 12 hours  insulin glargine Injectable (LANTUS) 25 Unit(s) SubCutaneous at bedtime  insulin lispro (HumaLOG) corrective regimen sliding scale   SubCutaneous Before meals and at bedtime  insulin lispro Injectable (HumaLOG) 7 Unit(s) SubCutaneous three times a day before meals  lisinopril 10 milliGRAM(s) Oral daily  methylPREDNISolone sodium succinate Injectable 60 milliGRAM(s) IV Push every 8 hours  remdesivir  IVPB 100 milliGRAM(s) IV Intermittent every 24 hours  remdesivir  IVPB   IV Intermittent   thiamine 100 milliGRAM(s) Oral daily  zinc sulfate 220 milliGRAM(s) Oral daily      MEDICATIONS  (PRN):  acetaminophen   Tablet .. 650 milliGRAM(s) Oral every 4 hours PRN Temp greater or equal to 38.5C (101.3F)  acetaminophen  Suppository .. 650 milliGRAM(s) Rectal every 4 hours PRN Temp greater or equal to 38C (100.4F)  ALBUTerol    90 MICROgram(s) HFA Inhaler 2 Puff(s) Inhalation every 4 hours PRN Shortness of Breath and/or Wheezing  dextrose 40% Gel 15 Gram(s) Oral once PRN Blood Glucose LESS THAN 70 milliGRAM(s)/deciliter  glucagon  Injectable 1 milliGRAM(s) IntraMuscular once PRN Glucose LESS THAN 70 milligrams/deciliter  ondansetron Injectable 4 milliGRAM(s) IV Push every 6 hours PRN Nausea and/or Vomiting      Allergies    No Known Allergies    Intolerances        PAST MEDICAL & SURGICAL HISTORY:  DM (diabetes mellitus)  Hypertriglyceridemia  Hypertension  No significant past surgical history      SOCIAL HISTORY  Smoking History:       REVIEW OF SYSTEMS:    CONSTITUTIONAL:  No distress    HEENT:  Eyes:  No diplopia or blurred vision. ENT:  No earache, sore throat or runny nose.    CARDIOVASCULAR:  No pressure, squeezing, tightness, heaviness or aching about the chest; no palpitations.    RESPIRATORY:  above    GASTROINTESTINAL:  No nausea, vomiting or diarrhea.    GENITOURINARY:  No dysuria, frequency or urgency.    NEUROLOGIC:  No paresthesias, fasciculations, seizures or weakness.    Extremities: No cyanosis, clubbing or edema    PSYCHIATRIC:  No disorder of thought or mood.    Vital Signs Last 24 Hrs  T(C): 37.1 (16 Aug 2020 08:20), Max: 37.1 (16 Aug 2020 08:20)  T(F): 98.7 (16 Aug 2020 08:20), Max: 98.7 (16 Aug 2020 08:20)  HR: 75 (16 Aug 2020 08:20) (53 - 85)  BP: 144/90 (16 Aug 2020 08:20) (103/60 - 144/90)  BP(mean): --  RR: 24 (16 Aug 2020 08:20) (22 - 25)  SpO2: 92% (16 Aug 2020 09:08) (90% - 97%)    PHYSICAL EXAMINATION:    GENERAL: The patient is awake and alert in no apparent distress.     HEENT: Head is normocephalic and atraumatic. Extraocular muscles are intact. Mucous membranes are moist.    NECK: Supple.    LUNGS: Clear to auscultation without wheezing, rales or rhonchi; respirations unlabored    HEART: Regular rate and rhythm without murmur.    ABDOMEN: Soft, nontender, and nondistended.      EXTREMITIES: Without any cyanosis, clubbing, rash, lesions or edema.    NEUROLOGIC: Grossly intact.    LABS:                        16.1   8.79  )-----------( 388      ( 15 Aug 2020 09:22 )             48.0     08-15    136  |  95<L>  |  23.0<H>  ----------------------------<  346<H>  4.3   |  24.0  |  0.58    Ca    8.8      15 Aug 2020 09:22  Phos  3.9     08-15  Mg     2.5     08-15    TPro  6.9  /  Alb  3.1<L>  /  TBili  0.3<L>  /  DBili  0.1  /  AST  16  /  ALT  22  /  AlkPhos  66  08-15        ABG - ( 14 Aug 2020 11:56 )  pH, Arterial: 7.46  pH, Blood: x     /  pCO2: 37    /  pO2: 48    / HCO3: 26    / Base Excess: 2.1   /  SaO2: 86                CARDIAC MARKERS ( 15 Aug 2020 11:24 )  x     / <0.01 ng/mL / x     / x     / x          D-Dimer Assay, Quantitative: <150 ng/mL DDU (08-15-20 @ 11:24)    Serum Pro-Brain Natriuretic Peptide: 47 pg/mL (08-15-20 @ 11:24)      Procalcitonin, Serum: 0.10 ng/mL (08-14-20 @ 12:01)      MICROBIOLOGY:    RADIOLOGY & ADDITIONAL STUDIES:  Reviewed on pacs

## 2020-08-16 NOTE — PROGRESS NOTE ADULT - ASSESSMENT
51 y/o M with PMHX HTN, HLD/Hypertriglyercidemia, DM2 BIBEMS to [facility] ER c/o SOB/MIGUEL, non-productive cough, fever/chills, associated with pleuritic CP which began 6 days ago and has progressively worsened. Patient seen in ER 8/8/20 and had COVID19 PCR screening and was rx'd Amoxicillin at that time. COVID19 PCR positive 8/8/20. Patient hypoxic on arrival 02sat noted 86%. Placed on 6L NC with moderate improvement but subsequently desatted with minimal exertion and was placed in prone position. Currently satting 97% on O2 via NC 6L in prone position. +Sick Contacts similar symptoms at home x2 Wife and Daughter. No tobacco or drug abuse. No other complaints. Denies current CP, HA/Dizziness/lightheadedness, focal deficits, n/v/d, abd pain, or urinary complaints. ROS negative unless mentioned above.     A/P:  Acute Hypoxemic Respiratory Failure 2/2 COVID19 PNA  -COVID19 PCR Positive 8/8/20  -CTA Chest negative for PE. +Extensive bilaterally asymmetric and peripherally, predominant ground glass opacities  -repeat labs/trend lymphocytopenia  -trend inflammatory markers   -blood cultures negative  -monitor telemetry  -O2 via NC titrate for O2sat >90% and <96%. Oxygen saturation 88-91% on 50% VM. Pulmonary consult, will change to Vapotherm 40%, ABG ordered  D-dimer < 150  -Self Prone Protocol q20 mins  -trend inflammatory markers. CRP trending down  -ID following, on Remdesivir day 4/5, tomorrow is last day  Increased dose of steroids as per ID recs. Initially was on Decadron 6mg IV daily without improvement, currently patient is on solumderole 60mg [id number], -Tylenol PRN for Fever  -VTE PPX Lovenox 40mg subq BID due to pro-thrombotic state with COVID  -Zinc Sulfate 220mg PO q24, Vitamin c, thiamine for suspected thiamine def  -Isolation Precautions, will continue with current therapy      AGMA  -Repeat ABG  -Monitor BMP    Polycythemia  -Hgb 17.1. Trend CBC, Hb is 16.1 today   s/p IVF    Pseudohyponatremia  -resolved    Hyperglycemia, DM2 uncontrolled  -HGA1c 11.7  -BG elevated on steroids, discussed with Diabetic educator, increase  to Lantus 25 and pre meal 7.   Adjust insulin as per BG levels    Transaminitis  -likely 2/2 Covid v diffuse hepatic steatosis seen on CT  -Trend LFTs  -hold Omega 3 Fatty Acids for now    CAD, HTN  -Continue Lisinopril 10mg PO q24   -Continue ASA 325mg PO Q24    DVT ppx.     Dispo: Once Hypoxemia is better and weaned off supplemental oxygen, likely 48 to 72h

## 2020-08-17 NOTE — PROGRESS NOTE ADULT - ASSESSMENT
49 y/o M with PMHX HTN, HLD/Hypertriglyercidemia, DM2 BIBEMS to [facility] ER c/o SOB/MIGUEL, non-productive cough, fever/chills, associated with pleuritic CP which began 6 days ago and has progressively worsened. Patient seen in ER 8/8/20 and had COVID19 PCR screening and was rx'd Amoxicillin at that time. COVID19 PCR positive 8/8/20. Patient hypoxic on arrival 02sat noted 86%. Placed on 6L NC with moderate improvement but subsequently desatted with minimal exertion and was placed in prone position. Currently satting 97% on O2 via NC 6L in prone position. +Sick Contacts similar symptoms at home x2 Wife and Daughter.     COVID 19 pneumonia  Acute hypoxic respiratory failure  Fever  Uncontrolled DM      - inflammatory markers still elevated  - CTA extensive b/l GGO-no PE  - c/w decadron 6 mg daily as per [facility] COVID 19 protocol  - c/w Remdesivir day #5  - Data regarding treatment of COVID 19 continues to evolve. Optimized supportive care remains the mainstay of therapy  - Avoid antibiotics unless there is a concern for a bacterial infection  - VTE prophylaxis per current [facility] system COVID 19 protocol  - Check CBC with diff, CMP with LFT's, Inflammatory markers (ESR, CRP, Ferritin, LDH,  procalcitonin)  q48h.    - Encourage self proning q2h and ambulation as tolerated  - Taper off O2 as tolerated- once tolerating room air would ideally monitor for 24h prior to discharge. Continue self quarantine at home x 14 days from date of positive COVID 19 PCR  - Inpatient Contact/Airborne isolation       Will Follow

## 2020-08-17 NOTE — DIETITIAN INITIAL EVALUATION ADULT. - OTHER INFO
49 y/o M with PMHX HTN, HLD/Hypertriglyercidemia, DM2 BIBEMS to [facility] ER c/o SOB/MIGUEL, non-productive cough, fever/chills, associated with pleuritic CP which began 6 days ago and has progressively worsened. Patient seen in ER 8/8/20 and had COVID19 PCR screening and was rx'd Amoxicillin at that time. COVID19 PCR positive 8/8/20. A1C 11.7 H. No weight hx noted. Poor po intake noted PTA.

## 2020-08-17 NOTE — PROGRESS NOTE ADULT - ASSESSMENT
50M PMH HTN, HLD, DM2 who p/w SOB/MIGUEL, f/w COVID-19 PNA    Impression:  - COVID-19 PNA    Plan:  - Continue to trend inflammatory markers (D-dimer, ferritin, ESR/CRP)  - Monitor O2 requirements - he remains on HFNC 100% at 40LPM  - C/w steroids as ordered  - C/w Remdesivir course  - OOBTC, PT/OT, ambulate  - DVT PPx  - Will continue to follow    Heriberto Skelton M.D.  Pulmonary & Critical Care Attending  [facility]  Pulmonary Medicine at [place]

## 2020-08-17 NOTE — PROGRESS NOTE ADULT - SUBJECTIVE AND OBJECTIVE BOX
BOUCHRA HERNANDEZ    [id number]    50y      Male    Patient is a 50y old  Male who presents with a chief complaint of Acute Hypoxemic Respiratory Failure, COVID19 PNA (17 Aug 2020 12:35)      INTERVAL HPI/OVERNIGHT EVENTS:      Patient is feeling improvement of SOB, denies fever, chills, chest pain, he is currently on high flow Vapotherm, doing well    REVIEW OF SYSTEMS:    CONSTITUTIONAL: No fever, has fatigue  RESPIRATORY: has cough, improved shortness of breath  CARDIOVASCULAR: No chest pain, palpitations  GASTROINTESTINAL: No abdominal, No nausea, vomiting  NEUROLOGICAL: No headaches,  loss of strength.  MISCELLANEOUS: No joint swelling or pain        Vital Signs Last 24 Hrs  T(C): 36.9 (17 Aug 2020 07:30), Max: 36.9 (16 Aug 2020 17:00)  T(F): 98.4 (17 Aug 2020 07:30), Max: 98.5 (16 Aug 2020 17:00)  HR: 58 (17 Aug 2020 07:30) (58 - 79)  BP: 93/62 (17 Aug 2020 07:30) (93/62 - 115/65)  RR: 22 (17 Aug 2020 07:30) (22 - 22)  SpO2: 97% (17 Aug 2020 07:30) (92% - 97%)    PHYSICAL EXAM:    GENERAL: Middle age male looking comfortable   HEENT: PERRL, +EOMI  NECK: soft, Supple, No JVD,   CHEST/LUNG: Decrease air entry bilaterally; No wheezing  HEART: S1S2+, Regular rate and rhythm; No murmurs  ABDOMEN: Soft, Nontender, Nondistended; Bowel sounds present  EXTREMITIES:  2+ Peripheral Pulses, No edema  SKIN: No rashes or lesions  NEURO: AAOX3, no focal deficits, no motor r sensory loss  PSYCH: normal mood      LABS:    08-17    x   |  x   |  x   ----------------------------<  x   x    |  x   |  0.51      TPro  6.4<L>  /  Alb  3.1<L>  /  TBili  0.6  /  DBili  0.1  /  AST  16  /  ALT  22  /  AlkPhos  69  08-17            I&O's Summary    16 Aug 2020 07:01  -  17 Aug 2020 07:00  --------------------------------------------------------  IN: 0 mL / OUT: 450 mL / NET: -450 mL        MEDICATIONS  (STANDING):  ascorbic acid 1500 milliGRAM(s) Oral daily  aspirin enteric coated 325 milliGRAM(s) Oral daily  dextrose 5%. 1000 milliLiter(s) (50 mL/Hr) IV Continuous <Continuous>  dextrose 50% Injectable 12.5 Gram(s) IV Push once  dextrose 50% Injectable 25 Gram(s) IV Push once  dextrose 50% Injectable 25 Gram(s) IV Push once  enoxaparin Injectable 40 milliGRAM(s) SubCutaneous every 12 hours  insulin glargine Injectable (LANTUS) 25 Unit(s) SubCutaneous at bedtime  insulin lispro (HumaLOG) corrective regimen sliding scale   SubCutaneous Before meals and at bedtime  insulin lispro Injectable (HumaLOG) 7 Unit(s) SubCutaneous three times a day before meals  lisinopril 10 milliGRAM(s) Oral daily  melatonin 5 milliGRAM(s) Oral at bedtime  methylPREDNISolone sodium succinate Injectable 60 milliGRAM(s) IV Push every 8 hours  remdesivir  IVPB 100 milliGRAM(s) IV Intermittent every 24 hours  remdesivir  IVPB   IV Intermittent   thiamine 100 milliGRAM(s) Oral daily  zinc sulfate 220 milliGRAM(s) Oral daily    MEDICATIONS  (PRN):  acetaminophen   Tablet .. 650 milliGRAM(s) Oral every 4 hours PRN Temp greater or equal to 38.5C (101.3F)  acetaminophen  Suppository .. 650 milliGRAM(s) Rectal every 4 hours PRN Temp greater or equal to 38C (100.4F)  ALBUTerol    90 MICROgram(s) HFA Inhaler 2 Puff(s) Inhalation every 4 hours PRN Shortness of Breath and/or Wheezing  dextrose 40% Gel 15 Gram(s) Oral once PRN Blood Glucose LESS THAN 70 milliGRAM(s)/deciliter  glucagon  Injectable 1 milliGRAM(s) IntraMuscular once PRN Glucose LESS THAN 70 milligrams/deciliter  ondansetron Injectable 4 milliGRAM(s) IV Push every 6 hours PRN Nausea and/or Vomiting

## 2020-08-17 NOTE — PROGRESS NOTE ADULT - SUBJECTIVE AND OBJECTIVE BOX
PULMONARY PROGRESS NOTE      BOUCHRA HRENANDEZ  MRN-[id number]    Patient is a 50y old  Male who presents with a chief complaint of Acute Hypoxemic Respiratory Failure, COVID19 PNA (16 Aug 2020 15:31)        INTERVAL HPI/OVERNIGHT EVENTS:  No acute events overnight. He feels better overall.     MEDICATIONS  (STANDING):  ascorbic acid 1500 milliGRAM(s) Oral daily  aspirin enteric coated 325 milliGRAM(s) Oral daily  dextrose 5%. 1000 milliLiter(s) (50 mL/Hr) IV Continuous <Continuous>  dextrose 50% Injectable 12.5 Gram(s) IV Push once  dextrose 50% Injectable 25 Gram(s) IV Push once  dextrose 50% Injectable 25 Gram(s) IV Push once  enoxaparin Injectable 40 milliGRAM(s) SubCutaneous every 12 hours  insulin glargine Injectable (LANTUS) 25 Unit(s) SubCutaneous at bedtime  insulin lispro (HumaLOG) corrective regimen sliding scale   SubCutaneous Before meals and at bedtime  insulin lispro Injectable (HumaLOG) 7 Unit(s) SubCutaneous three times a day before meals  lisinopril 10 milliGRAM(s) Oral daily  melatonin 5 milliGRAM(s) Oral at bedtime  methylPREDNISolone sodium succinate Injectable 60 milliGRAM(s) IV Push every 8 hours  remdesivir  IVPB 100 milliGRAM(s) IV Intermittent every 24 hours  remdesivir  IVPB   IV Intermittent   thiamine 100 milliGRAM(s) Oral daily  zinc sulfate 220 milliGRAM(s) Oral daily    MEDICATIONS  (PRN):  acetaminophen   Tablet .. 650 milliGRAM(s) Oral every 4 hours PRN Temp greater or equal to 38.5C (101.3F)  acetaminophen  Suppository .. 650 milliGRAM(s) Rectal every 4 hours PRN Temp greater or equal to 38C (100.4F)  ALBUTerol    90 MICROgram(s) HFA Inhaler 2 Puff(s) Inhalation every 4 hours PRN Shortness of Breath and/or Wheezing  dextrose 40% Gel 15 Gram(s) Oral once PRN Blood Glucose LESS THAN 70 milliGRAM(s)/deciliter  glucagon  Injectable 1 milliGRAM(s) IntraMuscular once PRN Glucose LESS THAN 70 milligrams/deciliter  ondansetron Injectable 4 milliGRAM(s) IV Push every 6 hours PRN Nausea and/or Vomiting    Allergies    No Known Allergies    Intolerances      PAST MEDICAL & SURGICAL HISTORY:  DM (diabetes mellitus)  Hypertriglyceridemia  Hypertension  No significant past surgical history        REVIEW OF SYSTEMS:  Negative except as noted in HPI      Vital Signs Last 24 Hrs  T(C): 36.9 (17 Aug 2020 07:30), Max: 36.9 (16 Aug 2020 17:00)  T(F): 98.4 (17 Aug 2020 07:30), Max: 98.5 (16 Aug 2020 17:00)  HR: 58 (17 Aug 2020 07:30) (58 - 79)  BP: 93/62 (17 Aug 2020 07:30) (93/62 - 115/65)  BP(mean): --  RR: 22 (17 Aug 2020 07:30) (22 - 22)  SpO2: 97% (17 Aug 2020 07:30) (92% - 97%)      PHYSICAL EXAMINATION:  GEN: In no apparent distress  HEENT: NC/AT  NECK: Supple, non-tender  CV: +S1, S2, RRR  RESPIRATORY: CTA B/L, good inspiratory effort, non-labored breathing  ABDOMEN: Soft, NT/ND, +BS  EXTREMITIES: No rashes, lesions, or pitting edema noted  NEURO: No focal deficits, grossly intact  PSYCH: Normal affect      LABS:    08-17    x   |  x   |  x   ----------------------------<  x   x    |  x   |  0.51      TPro  6.4<L>  /  Alb  3.1<L>  /  TBili  0.6  /  DBili  0.1  /  AST  16  /  ALT  22  /  AlkPhos  69  08-17                Serum Pro-Brain Natriuretic Peptide: 47 pg/mL (08-15-20 @ 11:24)            MICROBIOLOGY:      RADIOLOGY & ADDITIONAL STUDIES:  CTA Chest (8/12):  < from: CT Angio Chest w/ IV Cont (08.12.20 @ 22:44) >  IMPRESSION:  No evidence of pulmonary embolism.    Extensive, bilaterally asymmetric and peripherally predominant groundglass opacities concerning for atypical infection, especially a viral etiology such as COVID-19. Alternative etiologies include diffuse alveolar hemorrhage, eosinophilic lung disease, or vasculitis.    < end of copied text >      ECHO:

## 2020-08-17 NOTE — PROGRESS NOTE ADULT - ASSESSMENT
51 y/o M with PMHX HTN, HLD/Hypertriglyercidemia, DM2 BIBEMS to [facility] ER c/o SOB/MIGUEL, non-productive cough, fever/chills, associated with pleuritic CP which began 6 days ago and has progressively worsened. Patient seen in ER 8/8/20 and had COVID19 PCR screening and was rx'd Amoxicillin at that time. COVID19 PCR positive 8/8/20. Patient hypoxic on arrival 02sat noted 86%. Placed on 6L NC with moderate improvement but subsequently desatted with minimal exertion and was placed in prone position. Currently satting 97% on O2 via NC 6L in prone position. +Sick Contacts similar symptoms at home x2 Wife and Daughter. No tobacco or drug abuse. No other complaints. Denies current CP, HA/Dizziness/lightheadedness, focal deficits, n/v/d, abd pain, or urinary complaints. ROS negative unless mentioned above.     A/P:  Acute Hypoxemic Respiratory Failure 2/2 COVID19 PNA  -COVID19 PCR Positive 8/8/20  -CTA Chest negative for PE. +Extensive bilaterally asymmetric and peripherally, predominant ground glass opacities  -repeat labs/trend lymphocytopenia  -trend inflammatory markers   -blood cultures negative  -monitor telemetry  -O2 via NC titrate for O2sat >90% and <96%. Oxygen saturation 88-91% on 50% VM. Pulmonary consult, will change to Vapotherm 40%, ABG ordered  D-dimer < 150  -Self Prone Protocol q20 mins  -trend inflammatory markers. CRP trending down  -ID following, on Remdesivir day 5/5  Increased dose of steroids as per ID recs. Initially was on Decadron 6mg IV daily without improvement, currently patient is on solumderole 60mg [id number], -Tylenol PRN for Fever  -VTE PPX Lovenox 40mg subq BID due to pro-thrombotic state with COVID  -Zinc Sulfate 220mg PO q24, Vitamin c, thiamine for suspected thiamine def  -Isolation Precautions, will continue with current therapy, will wean off slowly from high flow to Venti mask      AGMA  -Repeat ABG  -Monitor BMP    Polycythemia  -Hgb 17.1. Trend CBC, Hb is 16.1 today   s/p IVF    Pseudohyponatremia  -resolved    Hyperglycemia, DM2 uncontrolled  -HGA1c 11.7  -BG elevated on steroids, discussed with Diabetic educator, increase  to Lantus 25 and pre meal 7.   Adjust insulin as per BG levels    Transaminitis  -likely 2/2 Covid v diffuse hepatic steatosis seen on CT  -Trend LFTs  -hold Omega 3 Fatty Acids for now    CAD, HTN  -Continue Lisinopril 10mg PO q24   -Continue ASA 325mg PO Q24    DVT ppx.     Dispo: Once Hypoxemia is better and weaned off supplemental oxygen, likely 48 to 72h

## 2020-08-17 NOTE — PROGRESS NOTE ADULT - SUBJECTIVE AND OBJECTIVE BOX
[facility]  INFECTIOUS DISEASES AND INTERNAL MEDICINE at [place]  =======================================================  Quinn Rivera MD  Diplomates American Board of Internal Medicine and Infectious Diseases  Tel: [phone]      Fax: [phone]  =======================================================    BOUCHRA HERNANDEZ [id number]    Follow up: covid 19 +   [id number]  cough has improved, breathing is better, remains on hiflo 40%    Allergies:  No Known Allergies      Medications:  acetaminophen   Tablet .. 650 milliGRAM(s) Oral every 4 hours PRN  acetaminophen  Suppository .. 650 milliGRAM(s) Rectal every 4 hours PRN  ALBUTerol    90 MICROgram(s) HFA Inhaler 2 Puff(s) Inhalation every 4 hours PRN  ascorbic acid 1500 milliGRAM(s) Oral daily  aspirin enteric coated 325 milliGRAM(s) Oral daily  dextrose 40% Gel 15 Gram(s) Oral once PRN  dextrose 5%. 1000 milliLiter(s) IV Continuous <Continuous>  dextrose 50% Injectable 12.5 Gram(s) IV Push once  dextrose 50% Injectable 25 Gram(s) IV Push once  dextrose 50% Injectable 25 Gram(s) IV Push once  enoxaparin Injectable 40 milliGRAM(s) SubCutaneous every 12 hours  glucagon  Injectable 1 milliGRAM(s) IntraMuscular once PRN  insulin glargine Injectable (LANTUS) 25 Unit(s) SubCutaneous at bedtime  insulin lispro (HumaLOG) corrective regimen sliding scale   SubCutaneous Before meals and at bedtime  insulin lispro Injectable (HumaLOG) 7 Unit(s) SubCutaneous three times a day before meals  lisinopril 10 milliGRAM(s) Oral daily  melatonin 5 milliGRAM(s) Oral at bedtime  methylPREDNISolone sodium succinate Injectable 60 milliGRAM(s) IV Push every 8 hours  ondansetron Injectable 4 milliGRAM(s) IV Push every 6 hours PRN  remdesivir  IVPB 100 milliGRAM(s) IV Intermittent every 24 hours  remdesivir  IVPB   IV Intermittent   thiamine 100 milliGRAM(s) Oral daily  zinc sulfate 220 milliGRAM(s) Oral daily            REVIEW OF SYSTEMS:  CONSTITUTIONAL:  No Fever or chills  HEENT:   No diplopia or blurred vision.  No earache, sore throat or runny nose.  CARDIOVASCULAR:  No pressure, squeezing, strangling, tightness, heaviness or aching about the chest, neck, axilla or epigastrium.  RESPIRATORY:  No cough, shortness of breath  GASTROINTESTINAL:  No nausea, vomiting or diarrhea.  GENITOURINARY:  No dysuria, frequency or urgency. No Blood in urine  MUSCULOSKELETAL:  no joint aches, no muscle pain  SKIN:  No change in skin, hair or nails.  NEUROLOGIC:  No Headaches, seizures or weakness.  PSYCHIATRIC:  No disorder of thought or mood.  ENDOCRINE:  No heat or cold intolerance  HEMATOLOGICAL:  No easy bruising or bleeding.            Physical Exam:  ICU Vital Signs Last 24 Hrs  T(C): 36.9 (17 Aug 2020 07:30), Max: 36.9 (16 Aug 2020 17:00)  T(F): 98.4 (17 Aug 2020 07:30), Max: 98.5 (16 Aug 2020 17:00)  HR: 58 (17 Aug 2020 07:30) (58 - 79)  BP: 93/62 (17 Aug 2020 07:30) (93/62 - 115/65)  BP(mean): --  ABP: --  ABP(mean): --  RR: 22 (17 Aug 2020 07:30) (22 - 22)  SpO2: 97% (17 Aug 2020 07:30) (92% - 97%)      GEN: NAD, pleasant  HEENT: normocephalic and atraumatic. EOMI. PERRL.  Anicteric   NECK: Supple.   LUNGS: Clear to auscultation.  HEART: Regular rate and rhythm without murmur.  ABDOMEN: Soft, nontender, and nondistended.  Positive bowel sounds.    : No CVA tenderness  EXTREMITIES: Without any edema.  MSK: no joint swelling  NEUROLOGIC: Cranial nerves II through XII are grossly intact. No focal deficits  PSYCHIATRIC: Appropriate affect .  SKIN: No Rash      Labs:    < from: CT Angio Chest w/ IV Cont (08.12.20 @ 22:44) >    IMPRESSION:  No evidence of pulmonary embolism.    Extensive, bilaterally asymmetric and peripherally predominant groundglass opacities concerning for atypical infection, especially a viral etiology such as COVID-19. Alternative etiologies include diffuse alveolar hemorrhage, eosinophilic lung disease, or vasculitis.        < end of copied text >

## 2020-08-18 NOTE — PROGRESS NOTE ADULT - SUBJECTIVE AND OBJECTIVE BOX
CC: Acute Hypoxemic Respiratory Failure, COVID19 PNA     INTERVAL HPI/OVERNIGHT EVENTS: PAtient seen and examined. C/O bad dreams after taking Melatonin last night. Denies chest pain, +MIGUEL. No nausea, vomiting, fever, chills. Feeling some improvement. MElatonin discontinued.     Vital Signs Last 24 Hrs  T(C): 36.8 (18 Aug 2020 08:09), Max: 36.8 (17 Aug 2020 15:20)  T(F): 98.2 (18 Aug 2020 08:09), Max: 98.3 (18 Aug 2020 05:12)  HR: 72 (18 Aug 2020 08:09) (64 - 77)  BP: 96/61 (18 Aug 2020 08:09) (96/60 - 118/70)  BP(mean): --  RR: 18 (18 Aug 2020 08:09) (18 - 20)  SpO2: 93% (18 Aug 2020 08:57) (93% - 96%)    PHYSICAL EXAM:    GENERAL: NAD  HEENT: PERRL, +EOMI  NECK: soft, Supple, No JVD,   CHEST/LUNG: Decrease air entry bilaterally; No wheezing. On Vapotherm 80%  HEART: S1S2+, Regular rate and rhythm; No murmurs  ABDOMEN: Soft, Nontender, Nondistended; Bowel sounds present  EXTREMITIES:  2+ Peripheral Pulses, No edema  SKIN: No rashes or lesions  NEURO: AAOX3, no focal deficits, no motor or sensory loss  PSYCH: normal mood      I&O's Detail    17 Aug 2020 07:01  -  18 Aug 2020 07:00  --------------------------------------------------------  IN:  Total IN: 0 mL    OUT:    Voided: 350 mL  Total OUT: 350 mL    Total NET: -350 mL          CARDIAC MARKERS ( 18 Aug 2020 07:00 )  x     / <0.01 ng/mL / x     / x     / x            18 Aug 2020 07:00    x      |  x      |  x      ----------------------------<  x      x       |  x      |  0.42       TPro  6.0    /  Alb  2.8    /  TBili  0.5    /  DBili  0.1    /  AST  16     /  ALT  22     /  AlkPhos  67     18 Aug 2020 07:00      CAPILLARY BLOOD GLUCOSE      POCT Blood Glucose.: 271 mg/dL (18 Aug 2020 11:34)  POCT Blood Glucose.: 239 mg/dL (18 Aug 2020 08:03)  POCT Blood Glucose.: 292 mg/dL (17 Aug 2020 21:32)  POCT Blood Glucose.: 292 mg/dL (17 Aug 2020 16:54)    LIVER FUNCTIONS - ( 18 Aug 2020 07:00 )  Alb: 2.8 g/dL / Pro: 6.0 g/dL / ALK PHOS: 67 U/L / ALT: 22 U/L / AST: 16 U/L / GGT: x               MEDICATIONS  (STANDING):  ascorbic acid 1500 milliGRAM(s) Oral daily  aspirin enteric coated 325 milliGRAM(s) Oral daily  dextrose 5%. 1000 milliLiter(s) (50 mL/Hr) IV Continuous <Continuous>  dextrose 50% Injectable 12.5 Gram(s) IV Push once  dextrose 50% Injectable 25 Gram(s) IV Push once  dextrose 50% Injectable 25 Gram(s) IV Push once  enoxaparin Injectable 40 milliGRAM(s) SubCutaneous every 12 hours  insulin glargine Injectable (LANTUS) 30 Unit(s) SubCutaneous at bedtime  insulin lispro (HumaLOG) corrective regimen sliding scale   SubCutaneous three times a day before meals  insulin lispro (HumaLOG) corrective regimen sliding scale   SubCutaneous at bedtime  insulin lispro Injectable (HumaLOG) 7 Unit(s) SubCutaneous three times a day before meals  methylPREDNISolone sodium succinate Injectable 60 milliGRAM(s) IV Push every 12 hours  thiamine 100 milliGRAM(s) Oral daily  zinc sulfate 220 milliGRAM(s) Oral daily    MEDICATIONS  (PRN):  acetaminophen   Tablet .. 650 milliGRAM(s) Oral every 4 hours PRN Temp greater or equal to 38.5C (101.3F)  acetaminophen  Suppository .. 650 milliGRAM(s) Rectal every 4 hours PRN Temp greater or equal to 38C (100.4F)  ALBUTerol    90 MICROgram(s) HFA Inhaler 2 Puff(s) Inhalation every 4 hours PRN Shortness of Breath and/or Wheezing  dextrose 40% Gel 15 Gram(s) Oral once PRN Blood Glucose LESS THAN 70 milliGRAM(s)/deciliter  glucagon  Injectable 1 milliGRAM(s) IntraMuscular once PRN Glucose LESS THAN 70 milligrams/deciliter  ondansetron Injectable 4 milliGRAM(s) IV Push every 6 hours PRN Nausea and/or Vomiting      RADIOLOGY & ADDITIONAL TESTS:

## 2020-08-18 NOTE — PROGRESS NOTE ADULT - ASSESSMENT
50M PMH HTN, HLD, DM2 who p/w SOB/MIGUEL, f/w COVID-19 PNA    Impression:  - COVID-19 PNA  - Hypoxic respiratory failure    Plan:  - Continue to trend inflammatory markers (D-dimer, ferritin, ESR/CRP)  - Monitor O2 requirements - he remains on HFNC, today on 80%  - C/w steroids as ordered, given improvement has been weaned to Q12H, continue to monitor clinical response  - Trial of awake proning to improve oxygenation, alveolar recruitment  - S/p Remdesivir course  - OOBTC, PT/OT, ambulate  - DVT PPx  - Pt overall doing better    Heriberto Skelton M.D.  Pulmonary & Critical Care Attending  [facility]  Pulmonary Medicine at [place]

## 2020-08-18 NOTE — PROGRESS NOTE ADULT - ASSESSMENT
51 y/o M with PMHX HTN, HLD/Hypertriglyercidemia, DM2 BIBEMS to [facility] ER c/o SOB/MIGUEL, non-productive cough, fever/chills, associated with pleuritic CP which began 6 days ago and has progressively worsened. Patient seen in ER 8/8/20 and had COVID19 PCR screening and was rx'd Amoxicillin at that time. COVID19 PCR positive 8/8/20. Patient hypoxic on arrival 02sat noted 86%. Placed on 6L NC with moderate improvement but subsequently desatted with minimal exertion and was placed in prone position. Currently satting 97% on O2 via NC 6L in prone position. +Sick Contacts similar symptoms at home x2 Wife and Daughter. No tobacco or drug abuse. No other complaints. Denies current CP, HA/Dizziness/lightheadedness, focal deficits, n/v/d, abd pain, or urinary complaints. ROS negative unless mentioned above.     A/P:  Acute Hypoxemic Respiratory Failure 2/2 COVID19 PNA  -COVID19 PCR Positive 8/8/20  -CTA Chest negative for PE. +Extensive bilaterally asymmetric and peripherally, predominant ground glass opacities  -repeat labs/trend lymphocytopenia  -trend inflammatory markers   -blood cultures negative  -monitor telemetry  -O2 via NC titrate for O2sat >90% and <96%. Oxygen saturation 88-91% on 50% VM. Pulmonary following, was changed to Vapotherm now on  80%,   D-dimer < 150  -Self Prone Protocol q20 mins  -trend inflammatory markers. CRP trending down  -ID following, completed Remdesivir   Increased dose of steroids as per ID recs. Initially was on Decadron 6mg IV daily without improvement, changed to Solumedrol 60 mg q8, now decreased to BID  -VTE PPX Lovenox 40mg subq BID due to pro-thrombotic state with COVID  -Zinc Sulfate 220mg PO q24, Vitamin c, thiamine for suspected thiamine def  -Isolation Precautions, will continue with current therapy, will wean off slowly from high flow to Venti mask  -Incentive spirometer      AGMA  -Resolved    Polycythemia  -Hgb 17.1. Trend CBC, hgb normalized  s/p IVF    Pseudohyponatremia  -resolved    Hyperglycemia, DM2 uncontrolled  -HGA1c 11.7  -BG elevated on steroids, discussed with Diabetic educator, increase  to Lantus 30 and continue  pre meal 7.   Adjust insulin as per BG levels. Patient needs to learn to self inject    Transaminitis  -likely 2/2 Covid v diffuse hepatic steatosis seen on CT  -Trend LFTs  -hold Omega 3 Fatty Acids for now    CAD, HTN  -BP low, decrease ACE add hold parameter  -Continue ASA 325mg PO Q24    DVT ppx. Lovenox    Dispo: Pending improvement in oxygenation.

## 2020-08-18 NOTE — PROGRESS NOTE ADULT - ASSESSMENT
51 y/o M with PMHX HTN, HLD/Hypertriglyercidemia, DM2 BIBEMS to [facility] ER c/o SOB/MIGUEL, non-productive cough, fever/chills, associated with pleuritic CP which began 6 days ago and has progressively worsened. Patient seen in ER 8/8/20 and had COVID19 PCR screening and was rx'd Amoxicillin at that time. COVID19 PCR positive 8/8/20. Patient hypoxic on arrival 02sat noted 86%. Placed on 6L NC with moderate improvement but subsequently desatted with minimal exertion and was placed in prone position. Currently satting 97% on O2 via NC 6L in prone position. +Sick Contacts similar symptoms at home x2 Wife and Daughter.     COVID 19 pneumonia  Acute hypoxic respiratory failure  Fever  Uncontrolled DM      - inflammatory markers still elevated, slow improvement  - CTA extensive b/l GGO-no PE  - patient was on decadron, now on solumedrol  - c/w Remdesivir -will extend course  - Data regarding treatment of COVID 19 continues to evolve. Optimized supportive care remains the mainstay of therapy  - Avoid antibiotics unless there is a concern for a bacterial infection  - VTE prophylaxis per current [facility] COVID 19 protocol  - Check CBC with diff, CMP with LFT's, Inflammatory markers (ESR, CRP, Ferritin, LDH,  procalcitonin)  q48h.    - Encourage self proning q2h and ambulation as tolerated  - Taper off O2 as tolerated- once tolerating room air would ideally monitor for 24h prior to discharge. Continue self quarantine at home x 14 days from date of positive COVID 19 PCR  - Inpatient Contact/Airborne isolation       Will Follow

## 2020-08-18 NOTE — PROGRESS NOTE ADULT - SUBJECTIVE AND OBJECTIVE BOX
PULMONARY PROGRESS NOTE      BOUCHRA HERNANDEZ  MRN-[id number]    Patient is a 50y old  Male who presents with a chief complaint of Acute Hypoxemic Respiratory Failure, COVID19 PNA (18 Aug 2020 13:04)        INTERVAL HPI/OVERNIGHT EVENTS:  Pt seen and examined at the bedside. No acute events overnight.    MEDICATIONS  (STANDING):  ascorbic acid 1500 milliGRAM(s) Oral daily  aspirin enteric coated 325 milliGRAM(s) Oral daily  dextrose 5%. 1000 milliLiter(s) (50 mL/Hr) IV Continuous <Continuous>  dextrose 50% Injectable 12.5 Gram(s) IV Push once  dextrose 50% Injectable 25 Gram(s) IV Push once  dextrose 50% Injectable 25 Gram(s) IV Push once  enoxaparin Injectable 40 milliGRAM(s) SubCutaneous every 12 hours  insulin glargine Injectable (LANTUS) 30 Unit(s) SubCutaneous at bedtime  insulin lispro (HumaLOG) corrective regimen sliding scale   SubCutaneous three times a day before meals  insulin lispro (HumaLOG) corrective regimen sliding scale   SubCutaneous at bedtime  insulin lispro Injectable (HumaLOG) 7 Unit(s) SubCutaneous three times a day before meals  methylPREDNISolone sodium succinate Injectable 60 milliGRAM(s) IV Push every 12 hours  thiamine 100 milliGRAM(s) Oral daily  zinc sulfate 220 milliGRAM(s) Oral daily    MEDICATIONS  (PRN):  acetaminophen   Tablet .. 650 milliGRAM(s) Oral every 4 hours PRN Temp greater or equal to 38.5C (101.3F)  acetaminophen  Suppository .. 650 milliGRAM(s) Rectal every 4 hours PRN Temp greater or equal to 38C (100.4F)  ALBUTerol    90 MICROgram(s) HFA Inhaler 2 Puff(s) Inhalation every 4 hours PRN Shortness of Breath and/or Wheezing  dextrose 40% Gel 15 Gram(s) Oral once PRN Blood Glucose LESS THAN 70 milliGRAM(s)/deciliter  glucagon  Injectable 1 milliGRAM(s) IntraMuscular once PRN Glucose LESS THAN 70 milligrams/deciliter  ondansetron Injectable 4 milliGRAM(s) IV Push every 6 hours PRN Nausea and/or Vomiting    Allergies    No Known Allergies    Intolerances    Paper tray only (Unknown)    PAST MEDICAL & SURGICAL HISTORY:  DM (diabetes mellitus)  Hypertriglyceridemia  Hypertension  No significant past surgical history        REVIEW OF SYSTEMS:  Negative except as noted in HPI      Vital Signs Last 24 Hrs  T(C): 36.8 (18 Aug 2020 08:09), Max: 36.8 (17 Aug 2020 15:20)  T(F): 98.2 (18 Aug 2020 08:09), Max: 98.3 (18 Aug 2020 05:12)  HR: 72 (18 Aug 2020 08:09) (64 - 77)  BP: 96/61 (18 Aug 2020 08:09) (96/60 - 118/70)  BP(mean): --  RR: 18 (18 Aug 2020 08:09) (18 - 20)  SpO2: 93% (18 Aug 2020 08:57) (93% - 96%)      PHYSICAL EXAMINATION:  GEN: In no apparent distress  HEENT: NC/AT  NECK: Supple, non-tender  CV: +S1, S2, RRR  RESPIRATORY: Coarse breath sounds, good inspiratory effort, non-labored breathing  ABDOMEN: Soft, NT/ND  EXTREMITIES: No rashes, lesions, or pitting edema noted  NEURO: No focal deficits, grossly intact  PSYCH: Normal affect      LABS:    08-18    x   |  x   |  x   ----------------------------<  x   x    |  x   |  0.42<L>      TPro  6.0<L>  /  Alb  2.8<L>  /  TBili  0.5  /  DBili  0.1  /  AST  16  /  ALT  22  /  AlkPhos  67  08-18          CARDIAC MARKERS ( 18 Aug 2020 07:00 )  x     / <0.01 ng/mL / x     / x     / x          D-Dimer Assay, Quantitative: <150 ng/mL DDU (08-18-20 @ 07:00)    Serum Pro-Brain Natriuretic Peptide: 54 pg/mL (08-18-20 @ 07:00)            MICROBIOLOGY:  COVID-19  Antibody - for prior infection screening (08.14.20 @ 01:01)    COVID-19 IgG Antibody Index: 0.30: Roche ECLIA Total AB (OMA)  NOTE: This result index represents a total antibody measurement,  which  includes IgG, IgA, and IgM  Negative <= 0.99 Index  Positive >= 1.00 Index Index    COVID-19 IgG Antibody Interpretation: Negative: This test has not been reviewed by the FDA by the standard review  process. It has been authorized for emergency use by the FDA. [employer] has validated this test to be accurate.  Negative results do not rule out SARS-CoV-2 infection, particularly in  those who have been in recent contact with the virus. Follow-up testing  with a molecular diagnostic test should be considered to rule out  infection in these individuals.  Results from antibody testing should not be used as thesole basis to  diagnose or exclude SARS-CoV-2 infection, or to inform infection status.  Positive results may rarely be due to past or present infection with  non-SARS-CoV-2 coronavirus strains, such as coronavirus HKU1, NL63, OC43,  or 229E. [facility], through extensive validation  testing, has confirmed that this risk is minimal with this test.        RADIOLOGY & ADDITIONAL STUDIES:  < from: CT Angio Chest w/ IV Cont (08.12.20 @ 22:44) >  IMPRESSION:  No evidence of pulmonary embolism.    Extensive, bilaterally asymmetric and peripherally predominant groundglass opacities concerning for atypical infection, especially a viral etiology such as COVID-19. Alternative etiologies include diffuse alveolar hemorrhage, eosinophilic lung disease, or vasculitis.    < end of copied text >      ECHO:

## 2020-08-18 NOTE — PROGRESS NOTE ADULT - SUBJECTIVE AND OBJECTIVE BOX
[facility]  INFECTIOUS DISEASES AND INTERNAL MEDICINE at [place]  =======================================================  Quinn Rivera MD  Diplomates American Board of Internal Medicine and Infectious Diseases  Tel: [phone]      Fax: [phone]  =======================================================    SOPHIA HERNANDEZO [id number]    Follow up: covid 19   id [id number]  feels better  sitting up in chair  remains on hi stacey 80% /30lpm    Allergies:  No Known Allergies  Paper tray only (Unknown)      Medications:  acetaminophen   Tablet .. 650 milliGRAM(s) Oral every 4 hours PRN  acetaminophen  Suppository .. 650 milliGRAM(s) Rectal every 4 hours PRN  ALBUTerol    90 MICROgram(s) HFA Inhaler 2 Puff(s) Inhalation every 4 hours PRN  ascorbic acid 1500 milliGRAM(s) Oral daily  aspirin enteric coated 325 milliGRAM(s) Oral daily  dextrose 40% Gel 15 Gram(s) Oral once PRN  dextrose 5%. 1000 milliLiter(s) IV Continuous <Continuous>  dextrose 50% Injectable 12.5 Gram(s) IV Push once  dextrose 50% Injectable 25 Gram(s) IV Push once  dextrose 50% Injectable 25 Gram(s) IV Push once  enoxaparin Injectable 40 milliGRAM(s) SubCutaneous every 12 hours  glucagon  Injectable 1 milliGRAM(s) IntraMuscular once PRN  insulin glargine Injectable (LANTUS) 30 Unit(s) SubCutaneous at bedtime  insulin lispro (HumaLOG) corrective regimen sliding scale   SubCutaneous three times a day before meals  insulin lispro (HumaLOG) corrective regimen sliding scale   SubCutaneous at bedtime  insulin lispro Injectable (HumaLOG) 7 Unit(s) SubCutaneous three times a day before meals  methylPREDNISolone sodium succinate Injectable 60 milliGRAM(s) IV Push every 12 hours  ondansetron Injectable 4 milliGRAM(s) IV Push every 6 hours PRN  remdesivir  IVPB 100 milliGRAM(s) IV Intermittent every 24 hours  thiamine 100 milliGRAM(s) Oral daily  zinc sulfate 220 milliGRAM(s) Oral daily            REVIEW OF SYSTEMS:  CONSTITUTIONAL:  No Fever or chills  HEENT:   No diplopia or blurred vision.  No earache, sore throat or runny nose.  CARDIOVASCULAR:  No pressure, squeezing, strangling, tightness, heaviness or aching about the chest, neck, axilla or epigastrium.  RESPIRATORY:  No cough, shortness of breath  GASTROINTESTINAL:  No nausea, vomiting or diarrhea.  GENITOURINARY:  No dysuria, frequency or urgency. No Blood in urine  MUSCULOSKELETAL:  no joint aches, no muscle pain  SKIN:  No change in skin, hair or nails.  NEUROLOGIC:  No Headaches, seizures or weakness.  PSYCHIATRIC:  No disorder of thought or mood.  ENDOCRINE:  No heat or cold intolerance  HEMATOLOGICAL:  No easy bruising or bleeding.            Physical Exam:  ICU Vital Signs Last 24 Hrs  T(C): 36.8 (18 Aug 2020 08:09), Max: 36.8 (18 Aug 2020 05:12)  T(F): 98.2 (18 Aug 2020 08:09), Max: 98.3 (18 Aug 2020 05:12)  HR: 72 (18 Aug 2020 08:09) (64 - 72)  BP: 96/61 (18 Aug 2020 08:09) (96/60 - 105/67)  BP(mean): --  ABP: --  ABP(mean): --  RR: 18 (18 Aug 2020 08:09) (18 - 20)  SpO2: 93% (18 Aug 2020 08:57) (93% - 96%)      GEN: NAD, pleasant  HEENT: normocephalic and atraumatic. EOMI. PERRL.  Anicteric   NECK: Supple.   LUNGS: Clear to auscultation.  HEART: Regular rate and rhythm without murmur.  ABDOMEN: Soft, nontender, and nondistended.  Positive bowel sounds.    : No CVA tenderness  EXTREMITIES: Without any edema.  MSK: no joint swelling  NEUROLOGIC: Cranial nerves II through XII are grossly intact. No focal deficits  PSYCHIATRIC: Appropriate affect .  SKIN: No Rash      Labs:

## 2020-08-19 NOTE — PROVIDER CONTACT NOTE (OTHER) - SITUATION
monitor tech notified RN of episode from NSR to Aflutter then back to NSR. Monitor tech advised aflutter did not last longer then 60 seconds.

## 2020-08-19 NOTE — PROGRESS NOTE ADULT - SUBJECTIVE AND OBJECTIVE BOX
PULMONARY PROGRESS NOTE      MARILIN HERNANDEZANUJA-[id number]    Patient is a 50y old  Male who presents with a chief complaint of Acute Hypoxemic Respiratory Failure, COVID19 PNA (18 Aug 2020 17:27)      INTERVAL HPI/OVERNIGHT EVENTS:  feeling better  completed remdesivir  self positioning  minimal pain  O2 90% and 40lpm vapotherm      MEDICATIONS  (STANDING):  ascorbic acid 1500 milliGRAM(s) Oral daily  aspirin enteric coated 325 milliGRAM(s) Oral daily  dextrose 5%. 1000 milliLiter(s) (50 mL/Hr) IV Continuous <Continuous>  dextrose 50% Injectable 12.5 Gram(s) IV Push once  dextrose 50% Injectable 25 Gram(s) IV Push once  dextrose 50% Injectable 25 Gram(s) IV Push once  enoxaparin Injectable 40 milliGRAM(s) SubCutaneous every 12 hours  insulin glargine Injectable (LANTUS) 30 Unit(s) SubCutaneous at bedtime  insulin lispro (HumaLOG) corrective regimen sliding scale   SubCutaneous three times a day before meals  insulin lispro (HumaLOG) corrective regimen sliding scale   SubCutaneous at bedtime  insulin lispro Injectable (HumaLOG) 7 Unit(s) SubCutaneous three times a day before meals  lisinopril 5 milliGRAM(s) Oral daily  methylPREDNISolone sodium succinate Injectable 60 milliGRAM(s) IV Push every 12 hours  remdesivir  IVPB 100 milliGRAM(s) IV Intermittent every 24 hours  thiamine 100 milliGRAM(s) Oral daily  zinc sulfate 220 milliGRAM(s) Oral daily      MEDICATIONS  (PRN):  acetaminophen   Tablet .. 650 milliGRAM(s) Oral every 4 hours PRN Temp greater or equal to 38.5C (101.3F)  acetaminophen  Suppository .. 650 milliGRAM(s) Rectal every 4 hours PRN Temp greater or equal to 38C (100.4F)  ALBUTerol    90 MICROgram(s) HFA Inhaler 2 Puff(s) Inhalation every 4 hours PRN Shortness of Breath and/or Wheezing  dextrose 40% Gel 15 Gram(s) Oral once PRN Blood Glucose LESS THAN 70 milliGRAM(s)/deciliter  glucagon  Injectable 1 milliGRAM(s) IntraMuscular once PRN Glucose LESS THAN 70 milligrams/deciliter  ondansetron Injectable 4 milliGRAM(s) IV Push every 6 hours PRN Nausea and/or Vomiting      Allergies    No Known Allergies    Intolerances    Paper tray only (Unknown)      PAST MEDICAL & SURGICAL HISTORY:  DM (diabetes mellitus)  Hypertriglyceridemia  Hypertension  No significant past surgical history      SOCIAL HISTORY  Smoking History:       REVIEW OF SYSTEMS:    CONSTITUTIONAL:  No distress    HEENT:  Eyes:  No diplopia or blurred vision. ENT:  No earache, sore throat or runny nose.    CARDIOVASCULAR:  No pressure, squeezing, tightness, heaviness or aching about the chest; no palpitations.    RESPIRATORY:  above    GASTROINTESTINAL:  No nausea, vomiting or diarrhea.    GENITOURINARY:  No dysuria, frequency or urgency.    NEUROLOGIC:  No paresthesias, fasciculations, seizures or weakness.    Extremities: No cyanosis, clubbing or edema    PSYCHIATRIC:  No disorder of thought or mood.    Vital Signs Last 24 Hrs  T(C): 36.6 (19 Aug 2020 08:30), Max: 36.6 (19 Aug 2020 08:30)  T(F): 97.9 (19 Aug 2020 08:30), Max: 97.9 (19 Aug 2020 08:30)  HR: 86 (19 Aug 2020 08:30) (67 - 86)  BP: 125/83 (19 Aug 2020 08:30) (97/57 - 125/83)  BP(mean): --  RR: 19 (19 Aug 2020 08:30) (18 - 20)  SpO2: 93% (19 Aug 2020 08:30) (92% - 95%)    PHYSICAL EXAMINATION:    GENERAL: The patient is awake and alert in no apparent distress.     HEENT: Head is normocephalic and atraumatic. Extraocular muscles are intact. Mucous membranes are moist.    NECK: Supple.    LUNGS: Clear to auscultation without wheezing, rales or rhonchi; respirations unlabored    HEART: Regular rate and rhythm without murmur.    ABDOMEN: Soft, nontender, and nondistended.      EXTREMITIES: Without any cyanosis, clubbing, rash, lesions or edema.    NEUROLOGIC: Grossly intact.    LABS:    08-19    x   |  x   |  x   ----------------------------<  x   x    |  x   |  0.43<L>      TPro  6.0<L>  /  Alb  2.9<L>  /  TBili  0.5  /  DBili  0.1  /  AST  16  /  ALT  23  /  AlkPhos  68  08-19          CARDIAC MARKERS ( 18 Aug 2020 07:00 )  x     / <0.01 ng/mL / x     / x     / x            Serum Pro-Brain Natriuretic Peptide: 54 pg/mL (08-18-20 @ 07:00)    D-Dimer Assay, Quantitative (08.18.20 @ 07:00)    D-Dimer Assay, Quantitative: <150 ng/mL DDU          MICROBIOLOGY:    RADIOLOGY & ADDITIONAL STUDIES:

## 2020-08-19 NOTE — PROGRESS NOTE ADULT - ASSESSMENT
COVID 19 with severe pneumonitis  Improving  No evidence yet of cytokine storm    Plan:  cont rx and plan

## 2020-08-19 NOTE — CHART NOTE - NSCHARTNOTEFT_GEN_A_CORE
Notified by RN pt with episode of questionable arrhythmia on cardiac monitor x1, pt asymptomatic. Strip reviewed with EKG tech, 20 beats of nonsustained Vtach x1 episode with spontaneous conversion to HR 70 NSR. Patient is stable, denies c/o CP, chest pressure, palpitations, dizziness, SOB at rest. +S1/S2, RRR. Lungs without wheeze, rales, rhonchi B/L, Decreased air entry b/L. T 97.6 F oral, /79, P 99, RR 19 unlabored, O2 sat 90% on High-flow NC.  Ordered STAT BMP, Mg, Phosphorus, will f/u on results. RN instructed to continue to monitor pt and notify provider of any changes in pt status. Notified by RN pt with episode of questionable arrhythmia on cardiac monitor x1, pt asymptomatic. Strip reviewed with EKG tech, 20 beats of nonsustained Vtach x1 episode with spontaneous conversion to HR 70 NSR. Patient is stable, denies c/o CP, chest pressure, palpitations, dizziness, SOB at rest. +S1/S2, RRR. Lungs without wheeze, rales, rhonchi B/L, Decreased air entry b/L. T 97.6 F oral, /79, P 99, RR 19 unlabored, O2 sat 90% on High-flow NC.  Ordered STAT BMP, Mg, Phosphorus, will f/u on results. RN instructed to continue to monitor pt and notify provider of any changes in pt status.    Lab results reviewed, corrected Na 132, corrected Ca 9.2, continue to monitor. Labs in AM.

## 2020-08-19 NOTE — PROGRESS NOTE ADULT - ASSESSMENT
51 y/o M with PMHX HTN, HLD/Hypertriglyercidemia, DM2 BIBEMS to [facility] ER c/o SOB/MIGUEL, non-productive cough, fever/chills, associated with pleuritic CP which began 6 days ago and has progressively worsened. Patient seen in ER 8/8/20 and had COVID19 PCR screening and was rx'd Amoxicillin at that time. COVID19 PCR positive 8/8/20. Patient hypoxic on arrival 02sat noted 86%. Placed on 6L NC with moderate improvement but subsequently desatted with minimal exertion and was placed in prone position. Currently satting 97% on O2 via NC 6L in prone position. +Sick Contacts similar symptoms at home x2 Wife and Daughter.     COVID 19 pneumonia  Acute hypoxic respiratory failure  Fever  Uncontrolled DM      - inflammatory markers still elevated, slow improvement. No on fio2 90%  - CTA extensive b/l GGO-no PE  - patient was on decadron, now on solumedrol  - c/w Remdesivir x 10 days total  - Data regarding treatment of COVID 19 continues to evolve. Optimized supportive care remains the mainstay of therapy  - Avoid antibiotics unless there is a concern for a bacterial infection  - VTE prophylaxis per current [facility] COVID 19 protocol  - Check CBC with diff, CMP with LFT's, Inflammatory markers (ESR, CRP, Ferritin, LDH,  procalcitonin)  q48h.    - Encourage self proning q2h and ambulation as tolerated  - Taper off O2 as tolerated- once tolerating room air would ideally monitor for 24h prior to discharge. Continue self quarantine at home x 14 days from date of positive COVID 19 PCR  - Inpatient Contact/Airborne isolation     d/w Dr Villeda  Will Follow

## 2020-08-19 NOTE — PROVIDER CONTACT NOTE (OTHER) - ASSESSMENT
pt resting in chair, no complaints, will continue to monitor. current HR- 71 and NSR.
Pt A+Ox4, states he feels fine and is asymptomatic. No s/s of distress

## 2020-08-19 NOTE — PROGRESS NOTE ADULT - SUBJECTIVE AND OBJECTIVE BOX
BOUCHRA HERNANDEZ    [id number]    50y      Male    Patient is a 50y old  Male who presents with a chief complaint of Acute Hypoxemic Respiratory Failure, COVID19 PNA (19 Aug 2020 11:41)      INTERVAL HPI/OVERNIGHT EVENTS:      Patient is feeling improvement of SOB, he is still requiring  denies fever, chills, chest pain, he is currently on high flow Vapotherm, doing well    REVIEW OF SYSTEMS:    CONSTITUTIONAL: No fever, has fatigue  RESPIRATORY: has cough, improved shortness of breath  CARDIOVASCULAR: No chest pain, palpitations  GASTROINTESTINAL: No abdominal, No nausea, vomiting  NEUROLOGICAL: No headaches,  loss of strength.  MISCELLANEOUS: No joint swelling or pain        Vital Signs Last 24 Hrs  T(C): 36.6 (19 Aug 2020 08:30), Max: 36.6 (19 Aug 2020 08:30)  T(F): 97.9 (19 Aug 2020 08:30), Max: 97.9 (19 Aug 2020 08:30)  HR: 86 (19 Aug 2020 08:30) (67 - 86)  BP: 125/83 (19 Aug 2020 08:30) (97/57 - 125/83)  RR: 19 (19 Aug 2020 08:30) (18 - 20)  SpO2: 93% (19 Aug 2020 08:30) (92% - 95%)    PHYSICAL EXAM:      GENERAL: Middle age male looking comfortable   HEENT: PERRL, +EOMI  NECK: soft, Supple, No JVD,   CHEST/LUNG: Decrease air entry bilaterally; No wheezing  HEART: S1S2+, Regular rate and rhythm; No murmurs  ABDOMEN: Soft, Nontender, Nondistended; Bowel sounds present  EXTREMITIES:  1+ Peripheral Pulses, No edema  SKIN: No rashes or lesions  NEURO: AAOX3, no focal deficits, no motor r sensory loss  PSYCH: normal mood      LABS:    08-19    x   |  x   |  x   ----------------------------<  x   x    |  x   |  0.43<L>      TPro  6.0<L>  /  Alb  2.9<L>  /  TBili  0.5  /  DBili  0.1  /  AST  16  /  ALT  23  /  AlkPhos  68  08-19            I&O's Summary    18 Aug 2020 07:01  -  19 Aug 2020 07:00  --------------------------------------------------------  IN: 0 mL / OUT: 450 mL / NET: -450 mL    19 Aug 2020 07:01  -  19 Aug 2020 15:40  --------------------------------------------------------  IN: 0 mL / OUT: 800 mL / NET: -800 mL        MEDICATIONS  (STANDING):  ascorbic acid 1500 milliGRAM(s) Oral daily  aspirin enteric coated 325 milliGRAM(s) Oral daily  dextrose 5%. 1000 milliLiter(s) (50 mL/Hr) IV Continuous <Continuous>  dextrose 50% Injectable 12.5 Gram(s) IV Push once  dextrose 50% Injectable 25 Gram(s) IV Push once  dextrose 50% Injectable 25 Gram(s) IV Push once  enoxaparin Injectable 40 milliGRAM(s) SubCutaneous every 12 hours  insulin glargine Injectable (LANTUS) 30 Unit(s) SubCutaneous at bedtime  insulin lispro (HumaLOG) corrective regimen sliding scale   SubCutaneous three times a day before meals  insulin lispro (HumaLOG) corrective regimen sliding scale   SubCutaneous at bedtime  insulin lispro Injectable (HumaLOG) 7 Unit(s) SubCutaneous three times a day before meals  lisinopril 5 milliGRAM(s) Oral daily  methylPREDNISolone sodium succinate Injectable 60 milliGRAM(s) IV Push every 12 hours  remdesivir  IVPB 100 milliGRAM(s) IV Intermittent every 24 hours  thiamine 100 milliGRAM(s) Oral daily  zinc sulfate 220 milliGRAM(s) Oral daily    MEDICATIONS  (PRN):  acetaminophen   Tablet .. 650 milliGRAM(s) Oral every 4 hours PRN Temp greater or equal to 38.5C (101.3F)  acetaminophen  Suppository .. 650 milliGRAM(s) Rectal every 4 hours PRN Temp greater or equal to 38C (100.4F)  ALBUTerol    90 MICROgram(s) HFA Inhaler 2 Puff(s) Inhalation every 4 hours PRN Shortness of Breath and/or Wheezing  dextrose 40% Gel 15 Gram(s) Oral once PRN Blood Glucose LESS THAN 70 milliGRAM(s)/deciliter  glucagon  Injectable 1 milliGRAM(s) IntraMuscular once PRN Glucose LESS THAN 70 milligrams/deciliter  ondansetron Injectable 4 milliGRAM(s) IV Push every 6 hours PRN Nausea and/or Vomiting

## 2020-08-19 NOTE — PROGRESS NOTE ADULT - ASSESSMENT
51 y/o M with PMHX HTN, HLD/Hypertriglyercidemia, DM2 BIBEMS to [facility] ER c/o SOB/MIGUEL, non-productive cough, fever/chills, associated with pleuritic CP which began 6 days ago and has progressively worsened. Patient seen in ER 8/8/20 and had COVID19 PCR screening and was rx'd Amoxicillin at that time. COVID19 PCR positive 8/8/20. Patient hypoxic on arrival 02sat noted 86%. Placed on 6L NC with moderate improvement but subsequently desatted with minimal exertion and was placed in prone position. Currently satting 97% on O2 via NC 6L in prone position. +Sick Contacts similar symptoms at home x2 Wife and Daughter. No tobacco or drug abuse. No other complaints. Denies current CP, HA/Dizziness/lightheadedness, focal deficits, n/v/d, abd pain, or urinary complaints. ROS negative unless mentioned above.     A/P:  Acute Hypoxemic Respiratory Failure 2/2 COVID19 PNA  -COVID19 PCR Positive 8/8/20  -CTA Chest negative for PE. +Extensive bilaterally asymmetric and peripherally, predominant ground glass opacities  -repeat labs/trend lymphocytopenia  -trend inflammatory markers   -blood cultures negative  -monitor telemetry  -O2 via NC titrate for O2sat >90% and <96%. Oxygen saturation 88-91% on 50% VM. Pulmonary following, was changed to Vapotherm now on  80%,   D-dimer < 150  -Self Prone Protocol q20 mins  -trend inflammatory markers. CRP trending down  -ID following, continue Remdesivir as [er ID for total of 10 days.  Increased dose of steroids as per ID recs. Initially was on Decadron 6mg IV daily without improvement, changed to Solumedrol 60 mg q8, now decreased to BID, will taper more.   -VTE PPX Lovenox 40mg subq BID due to pro-thrombotic state with COVID  -Zinc Sulfate 220mg PO q24, Vitamin c, thiamine for suspected thiamine def  -Isolation Precautions, will continue with current therapy, will wean off slowly from high flow to Venti mask  -Incentive spirometer      AGMA  -Resolved    Polycythemia  -Hgb 17.1. Trend CBC, hgb normalized  s/p IVF    Pseudohyponatremia  -resolved    Hyperglycemia, DM2 uncontrolled  -HGA1c 11.7  -BG elevated on steroids, discussed with Diabetic educator, increase  to Lantus 30 and continue  pre meal 7.   Adjust insulin as per BG levels. Patient needs to learn to self inject    Transaminitis  -likely 2/2 Covid v diffuse hepatic steatosis seen on CT  -Trend LFTs  -hold Omega 3 Fatty Acids for now.     CAD, HTN  -BP low, decrease ACE add hold parameter  -Continue ASA 325mg PO Q24    DVT ppx. Lovenox    Dispo: Pending improvement in oxygenation.

## 2020-08-20 NOTE — PROGRESS NOTE ADULT - SUBJECTIVE AND OBJECTIVE BOX
PULMONARY PROGRESS NOTE      MARILIN HERNANDEZANUJA-[id number]    Patient is a 50y old  Male who presents with a chief complaint of Acute Hypoxemic Respiratory Failure, COVID19 PNA (19 Aug 2020 16:18)      INTERVAL HPI/OVERNIGHT EVENTS:  Awake alert in NAD  Remains on 90% Vapotherm  restarted on remdesivir    MEDICATIONS  (STANDING):  ascorbic acid 1500 milliGRAM(s) Oral daily  aspirin enteric coated 325 milliGRAM(s) Oral daily  dextrose 5%. 1000 milliLiter(s) (50 mL/Hr) IV Continuous <Continuous>  dextrose 50% Injectable 12.5 Gram(s) IV Push once  dextrose 50% Injectable 25 Gram(s) IV Push once  dextrose 50% Injectable 25 Gram(s) IV Push once  enoxaparin Injectable 40 milliGRAM(s) SubCutaneous every 12 hours  insulin glargine Injectable (LANTUS) 30 Unit(s) SubCutaneous at bedtime  insulin lispro (HumaLOG) corrective regimen sliding scale   SubCutaneous three times a day before meals  insulin lispro (HumaLOG) corrective regimen sliding scale   SubCutaneous at bedtime  insulin lispro Injectable (HumaLOG) 7 Unit(s) SubCutaneous three times a day before meals  lisinopril 5 milliGRAM(s) Oral daily  methylPREDNISolone sodium succinate Injectable 60 milliGRAM(s) IV Push every 12 hours  remdesivir  IVPB 100 milliGRAM(s) IV Intermittent every 24 hours  thiamine 100 milliGRAM(s) Oral daily  zinc sulfate 220 milliGRAM(s) Oral daily      MEDICATIONS  (PRN):  acetaminophen   Tablet .. 650 milliGRAM(s) Oral every 4 hours PRN Temp greater or equal to 38.5C (101.3F)  acetaminophen  Suppository .. 650 milliGRAM(s) Rectal every 4 hours PRN Temp greater or equal to 38C (100.4F)  ALBUTerol    90 MICROgram(s) HFA Inhaler 2 Puff(s) Inhalation every 4 hours PRN Shortness of Breath and/or Wheezing  dextrose 40% Gel 15 Gram(s) Oral once PRN Blood Glucose LESS THAN 70 milliGRAM(s)/deciliter  glucagon  Injectable 1 milliGRAM(s) IntraMuscular once PRN Glucose LESS THAN 70 milligrams/deciliter  ondansetron Injectable 4 milliGRAM(s) IV Push every 6 hours PRN Nausea and/or Vomiting      Allergies    No Known Allergies    Intolerances    Paper tray only (Unknown)      PAST MEDICAL & SURGICAL HISTORY:  DM (diabetes mellitus)  Hypertriglyceridemia  Hypertension  No significant past surgical history      SOCIAL HISTORY  Smoking History:       REVIEW OF SYSTEMS:    CONSTITUTIONAL:  No distress    HEENT:  Eyes:  No diplopia or blurred vision. ENT:  No earache, sore throat or runny nose.    CARDIOVASCULAR:  No pressure, squeezing, tightness, heaviness or aching about the chest; no palpitations.    RESPIRATORY:  above    GASTROINTESTINAL:  No nausea, vomiting or diarrhea.    GENITOURINARY:  No dysuria, frequency or urgency.    NEUROLOGIC:  No paresthesias, fasciculations, seizures or weakness.    Extremities: No cyanosis, clubbing or edema    PSYCHIATRIC:  No disorder of thought or mood.    Vital Signs Last 24 Hrs  T(C): 37 (20 Aug 2020 08:46), Max: 37 (20 Aug 2020 08:46)  T(F): 98.6 (20 Aug 2020 08:46), Max: 98.6 (20 Aug 2020 08:46)  HR: 84 (20 Aug 2020 08:46) (76 - 99)  BP: 105/65 (20 Aug 2020 08:46) (105/65 - 123/79)  BP(mean): --  RR: 20 (20 Aug 2020 08:46) (19 - 20)  SpO2: 90% (20 Aug 2020 09:17) (90% - 97%)    PHYSICAL EXAMINATION:    GENERAL: The patient is awake and alert in no apparent distress.     HEENT: Head is normocephalic and atraumatic. Extraocular muscles are intact. Mucous membranes are moist.    NECK: Supple.    LUNGS: Clear to auscultation without wheezing, rales or rhonchi; respirations unlabored    HEART: Regular rate and rhythm without murmur.    ABDOMEN: Soft, nontender, and nondistended.      EXTREMITIES: Without any cyanosis, clubbing, rash, lesions or edema.    NEUROLOGIC: Grossly intact.    LABS:                        15.7   13.58 )-----------( 532      ( 20 Aug 2020 08:11 )             46.2     08-20    135  |  96<L>  |  15.0  ----------------------------<  192<H>  4.7   |  28.0  |  0.40<L>    Ca    8.6      20 Aug 2020 08:11  Phos  3.0     08-20  Mg     2.3     08-20    TPro  6.0<L>  /  Alb  2.9<L>  /  TBili  0.5  /  DBili  0.1  /  AST  14  /  ALT  22  /  AlkPhos  67  08-20      Ferritin, Serum (08.20.20 @ 08:11)    Ferritin, Serum: 852 ng/mL    C-Reactive Protein, Serum (08.20.20 @ 08:11)    C-Reactive Protein, Serum: 1.01 mg/dL    Lactate Dehydrogenase, Serum (08.20.20 @ 08:11)    Lactate Dehydrogenase, Serum: 341 U/L              Serum Pro-Brain Natriuretic Peptide: 54 pg/mL (08-18-20 @ 07:00)          MICROBIOLOGY:    RADIOLOGY & ADDITIONAL STUDIES:  reviewed on Pacs

## 2020-08-20 NOTE — PROGRESS NOTE ADULT - SUBJECTIVE AND OBJECTIVE BOX
BOUCHRA HERNANDEZ    [id number]    50y      Male    Patient is a 50y old  Male who presents with a chief complaint of Acute Hypoxemic Respiratory Failure, COVID19 PNA (20 Aug 2020 09:18)      INTERVAL HPI/OVERNIGHT EVENTS:    Patient is feeling improvement of SOB, he is still requiring high flow 90%, denies fever, chills, chest pain, he is currently on high flow Vapotherm.     REVIEW OF SYSTEMS:    CONSTITUTIONAL: No fever, has fatigue  RESPIRATORY: has cough, improved shortness of breath  CARDIOVASCULAR: No chest pain, palpitations  GASTROINTESTINAL: No abdominal, No nausea, vomiting  NEUROLOGICAL: No headaches,  loss of strength.  MISCELLANEOUS: No joint swelling or pain        Vital Signs Last 24 Hrs  T(C): 37 (20 Aug 2020 08:46), Max: 37 (20 Aug 2020 08:46)  T(F): 98.6 (20 Aug 2020 08:46), Max: 98.6 (20 Aug 2020 08:46)  HR: 84 (20 Aug 2020 08:46) (76 - 99)  BP: 105/65 (20 Aug 2020 08:46) (105/65 - 123/79)  RR: 20 (20 Aug 2020 08:46) (19 - 20)  SpO2: 90% (20 Aug 2020 09:17) (90% - 97%)    PHYSICAL EXAM:      GENERAL: Middle age male looking comfortable   HEENT: PERRL, +EOMI  NECK: soft, Supple, No JVD,   CHEST/LUNG: Decrease air entry bilaterally; No wheezing  HEART: S1S2+, Regular rate and rhythm; No murmurs  ABDOMEN: Soft, Nontender, Nondistended; Bowel sounds present  EXTREMITIES:  1+ Peripheral Pulses, No edema  SKIN: No rashes or lesions  NEURO: AAOX3, no focal deficits, no motor r sensory loss  PSYCH: normal mood    LABS:                        15.7   13.58 )-----------( 532      ( 20 Aug 2020 08:11 )             46.2     08-20    135  |  96<L>  |  15.0  ----------------------------<  192<H>  4.7   |  28.0  |  0.40<L>    Ca    8.6      20 Aug 2020 08:11  Phos  3.0     08-20  Mg     2.3     08-20    TPro  6.0<L>  /  Alb  2.9<L>  /  TBili  0.5  /  DBili  0.1  /  AST  14  /  ALT  22  /  AlkPhos  67  08-20            I&O's Summary    19 Aug 2020 07:01  -  20 Aug 2020 07:00  --------------------------------------------------------  IN: 120 mL / OUT: 1700 mL / NET: -1580 mL        MEDICATIONS  (STANDING):  ascorbic acid 1500 milliGRAM(s) Oral daily  aspirin enteric coated 325 milliGRAM(s) Oral daily  dextrose 5%. 1000 milliLiter(s) (50 mL/Hr) IV Continuous <Continuous>  dextrose 50% Injectable 12.5 Gram(s) IV Push once  dextrose 50% Injectable 25 Gram(s) IV Push once  dextrose 50% Injectable 25 Gram(s) IV Push once  enoxaparin Injectable 40 milliGRAM(s) SubCutaneous every 12 hours  insulin glargine Injectable (LANTUS) 30 Unit(s) SubCutaneous at bedtime  insulin lispro (HumaLOG) corrective regimen sliding scale   SubCutaneous three times a day before meals  insulin lispro (HumaLOG) corrective regimen sliding scale   SubCutaneous at bedtime  insulin lispro Injectable (HumaLOG) 7 Unit(s) SubCutaneous three times a day before meals  lisinopril 5 milliGRAM(s) Oral daily  methylPREDNISolone sodium succinate Injectable 60 milliGRAM(s) IV Push every 12 hours  remdesivir  IVPB 100 milliGRAM(s) IV Intermittent every 24 hours  thiamine 100 milliGRAM(s) Oral daily  zinc sulfate 220 milliGRAM(s) Oral daily    MEDICATIONS  (PRN):  acetaminophen   Tablet .. 650 milliGRAM(s) Oral every 4 hours PRN Temp greater or equal to 38.5C (101.3F)  acetaminophen  Suppository .. 650 milliGRAM(s) Rectal every 4 hours PRN Temp greater or equal to 38C (100.4F)  ALBUTerol    90 MICROgram(s) HFA Inhaler 2 Puff(s) Inhalation every 4 hours PRN Shortness of Breath and/or Wheezing  dextrose 40% Gel 15 Gram(s) Oral once PRN Blood Glucose LESS THAN 70 milliGRAM(s)/deciliter  glucagon  Injectable 1 milliGRAM(s) IntraMuscular once PRN Glucose LESS THAN 70 milligrams/deciliter  ondansetron Injectable 4 milliGRAM(s) IV Push every 6 hours PRN Nausea and/or Vomiting

## 2020-08-20 NOTE — PROGRESS NOTE ADULT - ASSESSMENT
COVID 19 with severe pneumonitis  Improving  No evidence yet of cytokine storm    Plan:  cont rx and plan per iD  wean O2 as tolerated

## 2020-08-20 NOTE — PROGRESS NOTE ADULT - ASSESSMENT
51 y/o M with PMHX HTN, HLD/Hypertriglyercidemia, DM2 BIBEMS to [facility] ER c/o SOB/MIGUEL, non-productive cough, fever/chills, associated with pleuritic CP which began 6 days ago and has progressively worsened. Patient seen in ER 8/8/20 and had COVID19 PCR screening and was rx'd Amoxicillin at that time. COVID19 PCR positive 8/8/20. Patient hypoxic on arrival 02sat noted 86%. Placed on 6L NC with moderate improvement but subsequently desatted with minimal exertion and was placed in prone position. Currently satting 97% on O2 via NC 6L in prone position. +Sick Contacts similar symptoms at home x2 Wife and Daughter. No tobacco or drug abuse. No other complaints. Denies current CP, HA/Dizziness/lightheadedness, focal deficits, n/v/d, abd pain, or urinary complaints. ROS negative unless mentioned above.     A/P:  Acute Hypoxemic Respiratory Failure 2/2 COVID19 PNA  -COVID19 PCR Positive 8/8/20  -CTA Chest negative for PE. +Extensive bilaterally asymmetric and peripherally, predominant ground glass opacities  -repeat labs/trend lymphocytopenia  -trend inflammatory markers   -blood cultures negative  -monitor telemetry  -O2 via NC titrate for O2sat >90% and <96%. Oxygen saturation 88-91% on 50% VM. Pulmonary following, was changed to Vapotherm now on  90%,   D-dimer < 150, will repeat today if going up with get CT angio  -Self Prone Protocol q20 mins  -trend inflammatory markers. CRP trending down  -ID following, continue Remdesivir as per ID for total of 10 days.  Increased dose of steroids as per ID recs. Initially was on Decadron 6mg IV daily without improvement, changed to Solumedrol 60 mg q8, now decreased to BID, will taper more.   -VTE PPX Lovenox 40mg subq BID due to pro-thrombotic state with COVID  -Zinc Sulfate 220mg PO q24, Vitamin c, thiamine for suspected thiamine def  -Isolation Precautions, will continue with current therapy, will wean off slowly from high flow to Venti mask  -Incentive spirometer      AGMA  -Resolved    Polycythemia  -Hgb 17.1. Trend CBC, hgb normalized  s/p IVF    Pseudohyponatremia  -resolved    Hyperglycemia, DM2 uncontrolled  -HGA1c 11.7  -BG elevated on steroids, discussed with Diabetic educator, increase  to Lantus 30 and continue  pre meal 7.   Adjust insulin as per BG levels. Patient needs to learn to self inject    Transaminitis  -likely 2/2 Covid v diffuse hepatic steatosis seen on CT  -Trend LFTs  -hold Omega 3 Fatty Acids for now.     CAD, HTN  -BP low, decrease ACE add hold parameter  -Continue ASA 325mg PO Q24    DVT ppx. Lovenox    Dispo: Pending improvement in oxygenation.

## 2020-08-20 NOTE — PROGRESS NOTE ADULT - ASSESSMENT
49 y/o M with PMHX HTN, HLD/Hypertriglyercidemia, DM2 BIBEMS to [facility] ER c/o SOB/MIGUEL, non-productive cough, fever/chills, associated with pleuritic CP which began 6 days ago and has progressively worsened. Patient seen in ER 8/8/20 and had COVID19 PCR screening and was rx'd Amoxicillin at that time. COVID19 PCR positive 8/8/20. Patient hypoxic on arrival 02sat noted 86%. Placed on 6L NC with moderate improvement but subsequently desatted with minimal exertion and was placed in prone position. Currently satting 97% on O2 via NC 6L in prone position. +Sick Contacts similar symptoms at home x2 Wife and Daughter.     COVID 19 pneumonia  Acute hypoxic respiratory failure  Fever  Uncontrolled DM      - inflammatory markers still elevated, slow improvement. Now on fio2 90%  - CTA extensive b/l GGO-no PE  - patient was on decadron, now on solumedrol, continue for now  - c/w Remdesivir x 10 days total  - Data regarding treatment of COVID 19 continues to evolve. Optimized supportive care remains the mainstay of therapy  - Avoid antibiotics unless there is a concern for a bacterial infection  - VTE prophylaxis per current [facility] COVID 19 protocol  - Check CBC with diff, CMP with LFT's, Inflammatory markers (ESR, CRP, Ferritin, LDH,  procalcitonin)  q48h.    - Encourage self proning q2h and ambulation as tolerated  - Taper off O2 as tolerated- once tolerating room air would ideally monitor for 24h prior to discharge. Continue self quarantine at home x 14 days from date of positive COVID 19 PCR  - Inpatient Contact/Airborne isolation       Will Follow

## 2020-08-21 NOTE — PROGRESS NOTE ADULT - SUBJECTIVE AND OBJECTIVE BOX
CC: Acute Hypoxemic Respiratory Failure, COVID19 PNA/Diabetes    INTERVAL HPI/OVERNIGHT EVENTS: Patient seen and examined. Oxygen requirement increased to 100% Vapotherm this am as patient oxygen saturation consistently below 88% with -120. Patient able to speak in full sentences and able to eat breakfast. Sitting upright. Denies chest pain, +MIGUEL. No nausea, vomiting, fever, chills.     Vital Signs Last 24 Hrs  T(C): 36.6 (21 Aug 2020 00:03), Max: 36.6 (21 Aug 2020 00:03)  T(F): 97.8 (21 Aug 2020 00:03), Max: 97.8 (21 Aug 2020 00:03)  HR: 97 (21 Aug 2020 09:30) (71 - 101)  BP: 106/65 (21 Aug 2020 07:30) (96/57 - 116/73)  BP(mean): --  RR: 21 (21 Aug 2020 07:30) (20 - 21)  SpO2: 89% (21 Aug 2020 08:26) (89% - 95%)      PHYSICAL EXAM:      GENERAL: NAD  HEENT: PERRL, +EOMI  NECK: soft, Supple, No JVD,   CHEST/LUNG: Decrease air entry bilaterally; No wheezing, faint crackles left side  HEART: S1S2+, Regular rate and rhythm; No murmurs  ABDOMEN: Soft, Nontender, Nondistended; Bowel sounds present  EXTREMITIES:  1+ Peripheral Pulses, No edema  SKIN: No rashes or lesions  NEURO: AAOX3, no focal deficits, no motor or sensory loss  PSYCH: normal mood    I&O's Detail    20 Aug 2020 07:01  -  21 Aug 2020 07:00  --------------------------------------------------------  IN:  Total IN: 0 mL    OUT:    Voided: 200 mL  Total OUT: 200 mL    Total NET: -200 mL          CARDIAC MARKERS ( 21 Aug 2020 07:05 )  x     / <0.01 ng/mL / x     / x     / x                                15.7   13.58 )-----------( 532      ( 20 Aug 2020 08:11 )             46.2     21 Aug 2020 07:05    x      |  x      |  x      ----------------------------<  x      x       |  x      |  0.54     Ca    8.6        20 Aug 2020 08:11  Phos  3.0       20 Aug 2020 01:21  Mg     2.3       20 Aug 2020 08:11    TPro  5.7    /  Alb  2.7    /  TBili  0.5    /  DBili  0.1    /  AST  12     /  ALT  21     /  AlkPhos  70     21 Aug 2020 07:05      CAPILLARY BLOOD GLUCOSE      POCT Blood Glucose.: 158 mg/dL (21 Aug 2020 11:58)  POCT Blood Glucose.: 119 mg/dL (21 Aug 2020 08:13)  POCT Blood Glucose.: 234 mg/dL (20 Aug 2020 21:29)  POCT Blood Glucose.: 226 mg/dL (20 Aug 2020 17:02)    LIVER FUNCTIONS - ( 21 Aug 2020 07:05 )  Alb: 2.7 g/dL / Pro: 5.7 g/dL / ALK PHOS: 70 U/L / ALT: 21 U/L / AST: 12 U/L / GGT: x               MEDICATIONS  (STANDING):  ascorbic acid 1500 milliGRAM(s) Oral daily  aspirin enteric coated 325 milliGRAM(s) Oral daily  dextrose 5%. 1000 milliLiter(s) (50 mL/Hr) IV Continuous <Continuous>  dextrose 50% Injectable 12.5 Gram(s) IV Push once  dextrose 50% Injectable 25 Gram(s) IV Push once  dextrose 50% Injectable 25 Gram(s) IV Push once  enoxaparin Injectable 40 milliGRAM(s) SubCutaneous every 12 hours  insulin glargine Injectable (LANTUS) 25 Unit(s) SubCutaneous at bedtime  insulin lispro (HumaLOG) corrective regimen sliding scale   SubCutaneous three times a day before meals  insulin lispro (HumaLOG) corrective regimen sliding scale   SubCutaneous at bedtime  insulin lispro Injectable (HumaLOG) 7 Unit(s) SubCutaneous three times a day before meals  lisinopril 5 milliGRAM(s) Oral daily  methylPREDNISolone sodium succinate Injectable 60 milliGRAM(s) IV Push every 12 hours  remdesivir  IVPB 100 milliGRAM(s) IV Intermittent every 24 hours  thiamine 100 milliGRAM(s) Oral daily  zinc sulfate 220 milliGRAM(s) Oral daily    MEDICATIONS  (PRN):  acetaminophen   Tablet .. 650 milliGRAM(s) Oral every 4 hours PRN Temp greater or equal to 38.5C (101.3F)  acetaminophen  Suppository .. 650 milliGRAM(s) Rectal every 4 hours PRN Temp greater or equal to 38C (100.4F)  ALBUTerol    90 MICROgram(s) HFA Inhaler 2 Puff(s) Inhalation every 4 hours PRN Shortness of Breath and/or Wheezing  dextrose 40% Gel 15 Gram(s) Oral once PRN Blood Glucose LESS THAN 70 milliGRAM(s)/deciliter  glucagon  Injectable 1 milliGRAM(s) IntraMuscular once PRN Glucose LESS THAN 70 milligrams/deciliter  ondansetron Injectable 4 milliGRAM(s) IV Push every 6 hours PRN Nausea and/or Vomiting      RADIOLOGY & ADDITIONAL TESTS:

## 2020-08-21 NOTE — PROGRESS NOTE ADULT - ASSESSMENT
51 y/o M with PMHX HTN, HLD/Hypertriglyercidemia, DM2 BIBEMS to [facility] ER c/o SOB/MIGUEL, non-productive cough, fever/chills, associated with pleuritic CP which began 6 days ago and has progressively worsened. Patient seen in ER 8/8/20 and had COVID19 PCR screening and was rx'd Amoxicillin at that time. COVID19 PCR positive 8/8/20. Patient hypoxic on arrival 02sat noted 86%. Placed on 6L NC with moderate improvement but subsequently desatted with minimal exertion and was placed in prone position. Currently satting 97% on O2 via NC 6L in prone position. +Sick Contacts similar symptoms at home x2 Wife and Daughter.     COVID 19 pneumonia  Acute hypoxic respiratory failure  Fever  Uncontrolled DM      - inflammatory markers still elevated, slow improvement. Now on fio2 100%  - CTA extensive b/l GGO-no PE  - patient was on decadron, now on solumedrol, continue for now  - c/w Remdesivir x 10 days total  - CTA chest being repeated,  d dimer<150  - Data regarding treatment of COVID 19 continues to evolve. Optimized supportive care remains the mainstay of therapy  - Avoid antibiotics unless there is a concern for a bacterial infection  - VTE prophylaxis per current [facility] COVID 19 protocol  - Check CBC with diff, CMP with LFT's, Inflammatory markers (ESR, CRP, Ferritin, LDH,  procalcitonin)  q48h.    - Encourage self proning q2h and ambulation as tolerated  - Taper off O2 as tolerated- once tolerating room air would ideally monitor for 24h prior to discharge. Continue self quarantine at home x 14 days from date of positive COVID 19 PCR  - Inpatient Contact/Airborne isolation       Will Follow

## 2020-08-21 NOTE — PROGRESS NOTE ADULT - ASSESSMENT
49 y/o M with PMHX HTN, HLD/Hypertriglyercidemia, DM2 BIBEMS to [facility] ER c/o SOB/MIGUEL, non-productive cough, fever/chills, associated with pleuritic CP which began 6 days ago and has progressively worsened. Patient seen in ER 8/8/20 and had COVID19 PCR screening and was rx'd Amoxicillin at that time. COVID19 PCR positive 8/8/20. Patient hypoxic on arrival 02sat noted 86%. Placed on 6L NC with moderate improvement but subsequently desatted with minimal exertion and was placed in prone position. Currently satting 97% on O2 via NC 6L in prone position. +Sick Contacts similar symptoms at home x2 Wife and Daughter. No tobacco or drug abuse. No other complaints. Denies current CP, HA/Dizziness/lightheadedness, focal deficits, n/v/d, abd pain, or urinary complaints. ROS negative unless mentioned above. Patient is day 9 and requiring increase     A/P:  Acute Hypoxemic Respiratory Failure 2/2 COVID19 PNA  -COVID19 PCR Positive 8/8/20  -CTA Chest negative for PE. +Extensive bilaterally asymmetric and peripherally, predominant ground glass opacities  -repeat labs/trend lymphocytopenia  -trend inflammatory markers   -blood cultures negative  -monitor telemetry  -titrate for O2sat >90% and <96%.  Pulmonary following, was changed to Vapotherm now on  100%. D Dimer <150, however oxygen requirement increased, will get CT angio  -Self Prone Protocol q20 mins  -trend inflammatory markers. CRP trending down  -ID following, continue Remdesivir as per ID for total of 10 days.  Increased dose of steroids as per ID recs. Initially was on Decadron 6mg IV daily without improvement, changed to Solumedrol 60 mg q8, now decreased to BID.   -VTE PPX Lovenox 40mg subq BID due to pro-thrombotic state with COVID  -Zinc Sulfate 220mg PO q24, Vitamin c, thiamine for suspected thiamine def  -Isolation Precautions, will continue with current therapy, will wean off slowly from high flow to Venti mask  -Incentive spirometer      AGMA  -Resolved    Polycythemia  -Hgb 17.1. Trend CBC, hgb normalized  s/p IVF    Pseudohyponatremia  -resolved    Hyperglycemia, DM2 uncontrolled  -HGA1c 11.7  -BG elevated on steroids, discussed with Diabetic educator, change Lantus 25 and continue  pre meal 7.   Adjust insulin as per BG levels. Patient needs to learn to self inject    Transaminitis  -likely 2/2 Covid v diffuse hepatic steatosis seen on CT  -Trend LFTs  -hold Omega 3 Fatty Acids for now.     CAD, HTN  -BPon low side,  ACE decreased and  hold parameter added  -Continue ASA 325mg PO Q24    DVT ppx. Lovenox    Dispo: Pending improvement in oxygenation.

## 2020-08-21 NOTE — PROGRESS NOTE ADULT - ASSESSMENT
50M PMH HTN, HLD, DM2 who presents with SOB, cough, found with COVID-19 PNA    Impression:  - COVID-19 PNA  - Hypoxic respiratory failure    Recommendations:  - Pt symptomatically stable, but still requiring high levels of supplemental O2  - On 100% FiO2 on HFNC  - Keep OOBTC, ambulate, PT/OT  - Trend inflammatory markers  - C/w steroids as ordered  - C/w Remdesivir - has been extended to 10d course  - When in bed trial of awake proning  - Monitor closely  - DVT PPx  - Will continue to follow    Heriberto Skelton M.D.  Pulmonary & Critical Care Attending  [facility]  Pulmonary Medicine at [place]  T: [phone]  F: [phone]

## 2020-08-21 NOTE — PROGRESS NOTE ADULT - SUBJECTIVE AND OBJECTIVE BOX
[facility]  INFECTIOUS DISEASES AND INTERNAL MEDICINE at [place]  =======================================================  Quinn Rivera MD  Diplomates American Board of Internal Medicine and Infectious Diseases  Tel: [phone]      Fax: [phone]  =======================================================    BOUCHRA HERNANDEZ [id number]    Follow up: COVID 19  on fio2 100% hi stacey  spo2 93 %  feels well and remains optimistic    Allergies:  No Known Allergies  Paper tray only (Unknown)      Medications:  acetaminophen   Tablet .. 650 milliGRAM(s) Oral every 4 hours PRN  acetaminophen  Suppository .. 650 milliGRAM(s) Rectal every 4 hours PRN  ALBUTerol    90 MICROgram(s) HFA Inhaler 2 Puff(s) Inhalation every 4 hours PRN  ascorbic acid 1500 milliGRAM(s) Oral daily  aspirin enteric coated 325 milliGRAM(s) Oral daily  dextrose 40% Gel 15 Gram(s) Oral once PRN  dextrose 5%. 1000 milliLiter(s) IV Continuous <Continuous>  dextrose 50% Injectable 12.5 Gram(s) IV Push once  dextrose 50% Injectable 25 Gram(s) IV Push once  dextrose 50% Injectable 25 Gram(s) IV Push once  enoxaparin Injectable 40 milliGRAM(s) SubCutaneous every 12 hours  glucagon  Injectable 1 milliGRAM(s) IntraMuscular once PRN  insulin glargine Injectable (LANTUS) 25 Unit(s) SubCutaneous at bedtime  insulin lispro (HumaLOG) corrective regimen sliding scale   SubCutaneous three times a day before meals  insulin lispro (HumaLOG) corrective regimen sliding scale   SubCutaneous at bedtime  insulin lispro Injectable (HumaLOG) 7 Unit(s) SubCutaneous three times a day before meals  lisinopril 5 milliGRAM(s) Oral daily  methylPREDNISolone sodium succinate Injectable 60 milliGRAM(s) IV Push every 12 hours  ondansetron Injectable 4 milliGRAM(s) IV Push every 6 hours PRN  remdesivir  IVPB 100 milliGRAM(s) IV Intermittent every 24 hours  thiamine 100 milliGRAM(s) Oral daily  zinc sulfate 220 milliGRAM(s) Oral daily            REVIEW OF SYSTEMS:  CONSTITUTIONAL:  No Fever or chills  HEENT:   No diplopia or blurred vision.  No earache, sore throat or runny nose.  CARDIOVASCULAR:  No pressure, squeezing, strangling, tightness, heaviness or aching about the chest, neck, axilla or epigastrium.  RESPIRATORY:  No cough, shortness of breath  GASTROINTESTINAL:  No nausea, vomiting or diarrhea.  GENITOURINARY:  No dysuria, frequency or urgency. No Blood in urine  MUSCULOSKELETAL:  no joint aches, no muscle pain  SKIN:  No change in skin, hair or nails.  NEUROLOGIC:  No Headaches, seizures or weakness.  PSYCHIATRIC:  No disorder of thought or mood.  ENDOCRINE:  No heat or cold intolerance  HEMATOLOGICAL:  No easy bruising or bleeding.            Physical Exam:  ICU Vital Signs Last 24 Hrs  T(C): 36.9 (21 Aug 2020 15:41), Max: 36.9 (21 Aug 2020 15:41)  T(F): 98.5 (21 Aug 2020 15:41), Max: 98.5 (21 Aug 2020 15:41)  HR: 77 (21 Aug 2020 15:41) (74 - 101)  BP: 104/64 (21 Aug 2020 15:41) (96/57 - 116/73)  BP(mean): --  ABP: --  ABP(mean): --  RR: 20 (21 Aug 2020 15:41) (20 - 21)  SpO2: 96% (21 Aug 2020 15:41) (89% - 96%)      GEN: NAD, pleasant  HEENT: normocephalic and atraumatic. EOMI. PERRL.  Anicteric   NECK: Supple.   LUNGS: Clear to auscultation.  HEART: Regular rate and rhythm without murmur.  ABDOMEN: Soft, nontender, and nondistended.  Positive bowel sounds.    : No CVA tenderness  EXTREMITIES: Without any edema.  MSK: no joint swelling  NEUROLOGIC: Cranial nerves II through XII are grossly intact. No focal deficits  PSYCHIATRIC: Appropriate affect .  SKIN: No Rash      Labs:

## 2020-08-22 NOTE — PROGRESS NOTE ADULT - NSHPATTENDINGPLANDISCUSS_GEN_ALL_CORE
medical team
Patient and RN
Patient and RN
Patient, RN
patient, NP and RN
Patient and RN
patient, NP and RN
pt, rn, NP, pulm, ID

## 2020-08-22 NOTE — PROGRESS NOTE ADULT - ASSESSMENT
50M PMH HTN, HLD, DM2 who presents with SOB, cough, found with COVID-19 PNA    Impression:  - COVID-19 PNA  - Hypoxic respiratory failure    Recommendations:  - Pt symptomatically stable, but still requiring high levels of supplemental O2  - On 100% FiO2 on HFNC  - Keep OOBTC, ambulate, PT/OT  - Trend inflammatory markers  - C/w steroids as ordered  - C/w Remdesivir - has been extended to 10d course  - When in bed trial of awake proning  - Monitor closely  - DVT PPx  - Will continue to follow    Heriberto Skelton M.D.  Pulmonary & Critical Care Attending  [facility]  Pulmonary Medicine at [place]  T: [phone]  F: [phone]

## 2020-08-22 NOTE — PROGRESS NOTE ADULT - SUBJECTIVE AND OBJECTIVE BOX
PULMONARY PROGRESS NOTE      BOUCHRA HERNANDEZ  MRN-[id number]    Patient is a 50y old  Male who presents with a chief complaint of Acute Hypoxemic Respiratory Failure, COVID19 PNA (22 Aug 2020 11:07)        INTERVAL HPI/OVERNIGHT EVENTS:  Pt seen and examined at the bedside. He reports feeling stable, not acutely short of breath. Denies productive cough.    MEDICATIONS  (STANDING):  ascorbic acid 1500 milliGRAM(s) Oral daily  aspirin enteric coated 325 milliGRAM(s) Oral daily  dextrose 5%. 1000 milliLiter(s) (50 mL/Hr) IV Continuous <Continuous>  dextrose 50% Injectable 12.5 Gram(s) IV Push once  dextrose 50% Injectable 25 Gram(s) IV Push once  dextrose 50% Injectable 25 Gram(s) IV Push once  enoxaparin Injectable 40 milliGRAM(s) SubCutaneous every 12 hours  insulin glargine Injectable (LANTUS) 25 Unit(s) SubCutaneous at bedtime  insulin lispro (HumaLOG) corrective regimen sliding scale   SubCutaneous three times a day before meals  insulin lispro (HumaLOG) corrective regimen sliding scale   SubCutaneous at bedtime  insulin lispro Injectable (HumaLOG) 7 Unit(s) SubCutaneous three times a day before meals  lisinopril 5 milliGRAM(s) Oral daily  methylPREDNISolone sodium succinate Injectable 60 milliGRAM(s) IV Push every 12 hours  remdesivir  IVPB 100 milliGRAM(s) IV Intermittent every 24 hours  thiamine 100 milliGRAM(s) Oral daily  zinc sulfate 220 milliGRAM(s) Oral daily    MEDICATIONS  (PRN):  acetaminophen   Tablet .. 650 milliGRAM(s) Oral every 4 hours PRN Temp greater or equal to 38.5C (101.3F)  acetaminophen  Suppository .. 650 milliGRAM(s) Rectal every 4 hours PRN Temp greater or equal to 38C (100.4F)  ALBUTerol    90 MICROgram(s) HFA Inhaler 2 Puff(s) Inhalation every 4 hours PRN Shortness of Breath and/or Wheezing  dextrose 40% Gel 15 Gram(s) Oral once PRN Blood Glucose LESS THAN 70 milliGRAM(s)/deciliter  glucagon  Injectable 1 milliGRAM(s) IntraMuscular once PRN Glucose LESS THAN 70 milligrams/deciliter  ondansetron Injectable 4 milliGRAM(s) IV Push every 6 hours PRN Nausea and/or Vomiting    Allergies    No Known Allergies    Intolerances    Paper tray only (Unknown)    PAST MEDICAL & SURGICAL HISTORY:  DM (diabetes mellitus)  Hypertriglyceridemia  Hypertension  No significant past surgical history        REVIEW OF SYSTEMS:  Negative except as noted in HPI      Vital Signs Last 24 Hrs  T(C): 37.1 (22 Aug 2020 07:30), Max: 37.1 (22 Aug 2020 07:30)  T(F): 98.8 (22 Aug 2020 07:30), Max: 98.8 (22 Aug 2020 07:30)  HR: 86 (22 Aug 2020 07:30) (61 - 99)  BP: 113/62 (22 Aug 2020 07:30) (103/59 - 127/86)  BP(mean): --  RR: 19 (22 Aug 2020 07:30) (19 - 20)  SpO2: 88% (22 Aug 2020 09:30) (88% - 97%)      PHYSICAL EXAMINATION:  GEN: In no apparent distress, able to communicate in full sentences  HEENT: NC/AT  NECK: Supple, non-tender; R-neck crepitus noted  CV: +S1, S2, RRR  RESPIRATORY: B/L coarse breath sounds with good inspiratory effort, non-labored breathing  ABDOMEN: Soft, NT/ND, +BS  EXTREMITIES: No rashes, lesions, or pitting edema noted  NEURO: No focal deficits, grossly intact  PSYCH: Normal affect      LABS:    08-22    x   |  x   |  x   ----------------------------<  x   x    |  x   |  0.54      TPro  6.1<L>  /  Alb  3.0<L>  /  TBili  0.6  /  DBili  0.2  /  AST  13  /  ALT  23  /  AlkPhos  84  08-22          CARDIAC MARKERS ( 21 Aug 2020 07:05 )  x     / <0.01 ng/mL / x     / x     / x            Serum Pro-Brain Natriuretic Peptide: 40 pg/mL (08-21-20 @ 07:05)          MICROBIOLOGY:  COVID-19  Antibody - for prior infection screening (08.14.20 @ 01:01)    COVID-19 IgG Antibody Index: 0.30: Roche ECLIA Total AB (OMA)  NOTE: This result index represents a total antibody measurement,  which  includes IgG, IgA, and IgM  Negative <= 0.99 Index  Positive >= 1.00 Index Index    COVID-19 IgG Antibody Interpretation: Negative: This test has not been reviewed by the FDA by the standard review  process. It has been authorized for emergency use by the FDA. [facility] has validated this test to be accurate.  Negative results do not rule out SARS-CoV-2 infection, particularly in  those who have been in recent contact with the virus. Follow-up testing  with a molecular diagnostic test should be considered to rule out  infection in these individuals.  Results from antibody testing should not be used as thesole basis to  diagnose or exclude SARS-CoV-2 infection, or to inform infection status.  Positive results may rarely be due to past or present infection with  non-SARS-CoV-2 coronavirus strains, such as coronavirus HKU1, NL63, OC43,  or 229E. [facility], through extensive validation  testing, has confirmed that this risk is minimal with this test.        RADIOLOGY & ADDITIONAL STUDIES:  < from: CT Angio Chest w/ IV Cont (08.21.20 @ 16:45) >  FINDINGS:    LUNGS AND AIRWAYS: Patent central airways. Probable tracheal diverticulum again noted. Diffuse bilateral lung opacities including diffuse bilateral groundglass opacities with more dense areas of consolidation in the lower lungs, increased since 8/12/2020.  PLEURA: No pleural effusion.  MEDIASTINUM AND LUH: There is new pneumomediastinum including the prevascular space, aorticopulmonary window, paratracheal and subcarinal region, along the right heart border, and adjacent to the esophagus, greatest distally. Additional droplets of air are seen in the subcutaneous tissues of the right lower neck.  VESSELS: Evaluation of the pulmonary arteries demonstrates no pulmonary lesion. Thoracic aorta normal in caliber.  HEART: Heart size is normal. No pericardial effusion.  CHEST WALL AND LOWER NECK: See above. No enlarged axillary lymph nodes.  VISUALIZED UPPER ABDOMEN: Fatty infiltration of the liver.  BONES: Degenerative changes in the spine.    IMPRESSION:  No pulmonary embolism.    New pneumomediastinum and subcutaneous emphysema in the right lower neck.    Diffuse bilateral lung opacities, worsened since 8/12/2020 compatible with multifocal infection including viral pneumonia.    < end of copied text >

## 2020-08-22 NOTE — PROGRESS NOTE ADULT - SUBJECTIVE AND OBJECTIVE BOX
BOUCHRA HERNANDEZ    [id number]    50y      Male    Patient is a 50y old  Male who presents with a chief complaint of Acute Hypoxemic Respiratory Failure, COVID19 PNA (21 Aug 2020 16:21)      INTERVAL HPI/OVERNIGHT EVENTS:    Patient is feeling improvement of SOB, he is still requiring high flow 90%, denies fever, chills, chest pain, he is currently on high flow Vapotherm.     REVIEW OF SYSTEMS:    CONSTITUTIONAL: No fever, has fatigue  RESPIRATORY: has cough, improved shortness of breath  CARDIOVASCULAR: No chest pain, palpitations  GASTROINTESTINAL: No abdominal, No nausea, vomiting  NEUROLOGICAL: No headaches,  loss of strength.  MISCELLANEOUS: No joint swelling or pain        Vital Signs Last 24 Hrs  T(C): 37.1 (22 Aug 2020 07:30), Max: 37.1 (22 Aug 2020 07:30)  T(F): 98.8 (22 Aug 2020 07:30), Max: 98.8 (22 Aug 2020 07:30)  HR: 86 (22 Aug 2020 07:30) (61 - 99)  BP: 113/62 (22 Aug 2020 07:30) (103/59 - 127/86)  BP(mean): --  RR: 19 (22 Aug 2020 07:30) (19 - 20)  SpO2: 88% (22 Aug 2020 09:30) (88% - 97%)    PHYSICAL EXAM:  GENERAL: Middle age male looking comfortable   HEENT: PERRL, +EOMI  NECK: soft, Supple, No JVD,   CHEST/LUNG: Decrease air entry bilaterally; No wheezing  HEART: S1S2+, Regular rate and rhythm; No murmurs  ABDOMEN: Soft, Nontender, Nondistended; Bowel sounds present  EXTREMITIES:  1+ Peripheral Pulses, No edema  SKIN: No rashes or lesions  NEURO: AAOX3, no focal deficits, no motor r sensory loss  PSYCH: normal mood        LABS:    08-22    x   |  x   |  x   ----------------------------<  x   x    |  x   |  0.54      TPro  6.1<L>  /  Alb  3.0<L>  /  TBili  0.6  /  DBili  0.2  /  AST  13  /  ALT  23  /  AlkPhos  84  08-22            I&O's Summary    21 Aug 2020 07:01  -  22 Aug 2020 07:00  --------------------------------------------------------  IN: 0 mL / OUT: 825 mL / NET: -825 mL        MEDICATIONS  (STANDING):  ascorbic acid 1500 milliGRAM(s) Oral daily  aspirin enteric coated 325 milliGRAM(s) Oral daily  dextrose 5%. 1000 milliLiter(s) (50 mL/Hr) IV Continuous <Continuous>  dextrose 50% Injectable 12.5 Gram(s) IV Push once  dextrose 50% Injectable 25 Gram(s) IV Push once  dextrose 50% Injectable 25 Gram(s) IV Push once  enoxaparin Injectable 40 milliGRAM(s) SubCutaneous every 12 hours  insulin glargine Injectable (LANTUS) 25 Unit(s) SubCutaneous at bedtime  insulin lispro (HumaLOG) corrective regimen sliding scale   SubCutaneous three times a day before meals  insulin lispro (HumaLOG) corrective regimen sliding scale   SubCutaneous at bedtime  insulin lispro Injectable (HumaLOG) 7 Unit(s) SubCutaneous three times a day before meals  lisinopril 5 milliGRAM(s) Oral daily  methylPREDNISolone sodium succinate Injectable 60 milliGRAM(s) IV Push every 12 hours  remdesivir  IVPB 100 milliGRAM(s) IV Intermittent every 24 hours  thiamine 100 milliGRAM(s) Oral daily  zinc sulfate 220 milliGRAM(s) Oral daily    MEDICATIONS  (PRN):  acetaminophen   Tablet .. 650 milliGRAM(s) Oral every 4 hours PRN Temp greater or equal to 38.5C (101.3F)  acetaminophen  Suppository .. 650 milliGRAM(s) Rectal every 4 hours PRN Temp greater or equal to 38C (100.4F)  ALBUTerol    90 MICROgram(s) HFA Inhaler 2 Puff(s) Inhalation every 4 hours PRN Shortness of Breath and/or Wheezing  dextrose 40% Gel 15 Gram(s) Oral once PRN Blood Glucose LESS THAN 70 milliGRAM(s)/deciliter  glucagon  Injectable 1 milliGRAM(s) IntraMuscular once PRN Glucose LESS THAN 70 milligrams/deciliter  ondansetron Injectable 4 milliGRAM(s) IV Push every 6 hours PRN Nausea and/or Vomiting BOUCHRA HERNANDEZ    [id number]    50y      Male    Patient is a 50y old  Male who presents with a chief complaint of Acute Hypoxemic Respiratory Failure, COVID19 PNA (21 Aug 2020 16:21)      INTERVAL HPI/OVERNIGHT EVENTS:    Patient is feeling improvement of SOB, he is still requiring high flow 100%, he does not feel any worsening, denies fever, chills, chest pain, he is currently on high flow Vapotherm.     REVIEW OF SYSTEMS:    CONSTITUTIONAL: No fever, has fatigue  RESPIRATORY: has cough, improved shortness of breath  CARDIOVASCULAR: No chest pain, palpitations  GASTROINTESTINAL: No abdominal, No nausea, vomiting  NEUROLOGICAL: No headaches,  loss of strength.  MISCELLANEOUS: No joint swelling or pain        Vital Signs Last 24 Hrs  T(C): 37.1 (22 Aug 2020 07:30), Max: 37.1 (22 Aug 2020 07:30)  T(F): 98.8 (22 Aug 2020 07:30), Max: 98.8 (22 Aug 2020 07:30)  HR: 86 (22 Aug 2020 07:30) (61 - 99)  BP: 113/62 (22 Aug 2020 07:30) (103/59 - 127/86)  RR: 19 (22 Aug 2020 07:30) (19 - 20)  SpO2: 88% (22 Aug 2020 09:30) (88% - 97%)    PHYSICAL EXAM:  GENERAL: Middle age male looking comfortable   HEENT: PERRL, +EOMI  NECK: soft, Supple, No JVD,   CHEST/LUNG: Decrease air entry bilaterally; No wheezing  HEART: S1S2+, Regular rate and rhythm; No murmurs  ABDOMEN: Soft, Nontender, Nondistended; Bowel sounds present  EXTREMITIES:  1+ Peripheral Pulses, No edema  SKIN: No rashes or lesions  NEURO: AAOX3, no focal deficits, no motor r sensory loss  PSYCH: normal mood        LABS:    08-22    x   |  x   |  x   ----------------------------<  x   x    |  x   |  0.54      TPro  6.1<L>  /  Alb  3.0<L>  /  TBili  0.6  /  DBili  0.2  /  AST  13  /  ALT  23  /  AlkPhos  84  08-22            I&O's Summary    21 Aug 2020 07:01  -  22 Aug 2020 07:00  --------------------------------------------------------  IN: 0 mL / OUT: 825 mL / NET: -825 mL        MEDICATIONS  (STANDING):  ascorbic acid 1500 milliGRAM(s) Oral daily  aspirin enteric coated 325 milliGRAM(s) Oral daily  dextrose 5%. 1000 milliLiter(s) (50 mL/Hr) IV Continuous <Continuous>  dextrose 50% Injectable 12.5 Gram(s) IV Push once  dextrose 50% Injectable 25 Gram(s) IV Push once  dextrose 50% Injectable 25 Gram(s) IV Push once  enoxaparin Injectable 40 milliGRAM(s) SubCutaneous every 12 hours  insulin glargine Injectable (LANTUS) 25 Unit(s) SubCutaneous at bedtime  insulin lispro (HumaLOG) corrective regimen sliding scale   SubCutaneous three times a day before meals  insulin lispro (HumaLOG) corrective regimen sliding scale   SubCutaneous at bedtime  insulin lispro Injectable (HumaLOG) 7 Unit(s) SubCutaneous three times a day before meals  lisinopril 5 milliGRAM(s) Oral daily  methylPREDNISolone sodium succinate Injectable 60 milliGRAM(s) IV Push every 12 hours  remdesivir  IVPB 100 milliGRAM(s) IV Intermittent every 24 hours  thiamine 100 milliGRAM(s) Oral daily  zinc sulfate 220 milliGRAM(s) Oral daily    MEDICATIONS  (PRN):  acetaminophen   Tablet .. 650 milliGRAM(s) Oral every 4 hours PRN Temp greater or equal to 38.5C (101.3F)  acetaminophen  Suppository .. 650 milliGRAM(s) Rectal every 4 hours PRN Temp greater or equal to 38C (100.4F)  ALBUTerol    90 MICROgram(s) HFA Inhaler 2 Puff(s) Inhalation every 4 hours PRN Shortness of Breath and/or Wheezing  dextrose 40% Gel 15 Gram(s) Oral once PRN Blood Glucose LESS THAN 70 milliGRAM(s)/deciliter  glucagon  Injectable 1 milliGRAM(s) IntraMuscular once PRN Glucose LESS THAN 70 milligrams/deciliter  ondansetron Injectable 4 milliGRAM(s) IV Push every 6 hours PRN Nausea and/or Vomiting

## 2020-08-22 NOTE — PROGRESS NOTE ADULT - ASSESSMENT
49 y/o M with PMHX HTN, HLD/Hypertriglyercidemia, DM2 BIBEMS to [facility] ER c/o SOB/MIGUEL, non-productive cough, fever/chills, associated with pleuritic CP which began 6 days ago and has progressively worsened. Patient seen in ER 8/8/20 and had COVID19 PCR screening and was rx'd Amoxicillin at that time. COVID19 PCR positive 8/8/20. Patient hypoxic on arrival 02sat noted 86%. Placed on 6L NC with moderate improvement but subsequently desatted with minimal exertion and was placed in prone position. Currently satting 97% on O2 via NC 6L in prone position. +Sick Contacts similar symptoms at home x2 Wife and Daughter. No tobacco or drug abuse. No other complaints. Denies current CP, HA/Dizziness/lightheadedness, focal deficits, n/v/d, abd pain, or urinary complaints. ROS negative unless mentioned above. Patient is day 9 and requiring increase     A/P:  Acute Hypoxemic Respiratory Failure 2/2 COVID19 PNA  -COVID19 PCR Positive 8/8/20  -CTA Chest negative for PE. +Extensive bilaterally asymmetric and peripherally, predominant ground glass opacities  -repeat labs/trend lymphocytopenia  -trend inflammatory markers   -blood cultures negative  -monitor telemetry  -titrate for O2sat >90% and <96%.  Pulmonary following, was changed to Vapotherm now on  100%. D Dimer <150, however oxygen requirement increased, will get CT angio  -Self Prone Protocol q20 mins  -trend inflammatory markers. CRP trending down  -ID following, continue Remdesivir as per ID for total of 10 days.  Increased dose of steroids as per ID recs. Initially was on Decadron 6mg IV daily without improvement, changed to Solumedrol 60 mg q8, now decreased to BID.   -VTE PPX Lovenox 40mg subq BID due to pro-thrombotic state with COVID  -Zinc Sulfate 220mg PO q24, Vitamin c, thiamine for suspected thiamine def  -Isolation Precautions, will continue with current therapy, will wean off slowly from high flow to Venti mask  -Incentive spirometer      AGMA  -Resolved    Polycythemia  -Hgb 17.1. Trend CBC, hgb normalized  s/p IVF    Pseudohyponatremia  -resolved    Hyperglycemia, DM2 uncontrolled  -HGA1c 11.7  -BG elevated on steroids, discussed with Diabetic educator, change Lantus 25 and continue  pre meal 7.   Adjust insulin as per BG levels. Patient needs to learn to self inject    Transaminitis  -likely 2/2 Covid v diffuse hepatic steatosis seen on CT  -Trend LFTs  -hold Omega 3 Fatty Acids for now.     CAD, HTN  -BPon low side,  ACE decreased and  hold parameter added  -Continue ASA 325mg PO Q24    DVT ppx. Lovenox    Dispo: Pending improvement in oxygenation. 51 y/o M with PMHX HTN, HLD/Hypertriglyercidemia, DM2 BIBEMS to [facility] ER c/o SOB/MIGUEL, non-productive cough, fever/chills, associated with pleuritic CP which began 6 days ago and has progressively worsened. Patient seen in ER 8/8/20 and had COVID19 PCR screening and was rx'd Amoxicillin at that time. COVID19 PCR positive 8/8/20. Patient hypoxic on arrival 02sat noted 86%. Placed on 6L NC with moderate improvement but subsequently desatted with minimal exertion and was placed in prone position. Currently satting 97% on O2 via NC 6L in prone position. +Sick Contacts similar symptoms at home x2 Wife and Daughter. No tobacco or drug abuse. No other complaints. Denies current CP, HA/Dizziness/lightheadedness, focal deficits, n/v/d, abd pain, or urinary complaints. ROS negative unless mentioned above. Patient is day 9 and requiring increase     A/P:  Acute Hypoxemic Respiratory Failure 2/2 COVID19 PNA  -COVID19 PCR Positive 8/8/20  -CTA Chest negative for PE. +Extensive bilaterally asymmetric and peripherally, predominant ground glass opacities  -repeat labs/trend lymphocytopenia  -trend inflammatory markers   -blood cultures negative  -monitor telemetry  -titrate for O2sat >90% and <96%.  Pulmonary following, was changed to Vapotherm now on  100%. D Dimer <150, however oxygen requirement increased, CT angio of chest done showed No pulmonary embolism, New pneumomediastinum and subcutaneous emphysema in the right lower neck.  Diffuse bilateral lung opacities, worsened since 8/12/2020 compatible with multifocal infection including viral pneumonia.  will follow pulmonary and ID team  encouraged-Self Prone Protocol q20 mins  -trend inflammatory markers. CRP trending down  -ID following, continue Remdesivir as per ID for total of 10 days.  Increased dose of steroids as per ID recs. Initially was on Decadron 6mg IV daily without improvement, changed to Solumedrol 60 mg q8, now decreased to BID, -VTE PPX Lovenox 40mg subq BID due to pro-thrombotic state with COVID, -Zinc Sulfate 220mg PO q24, Vitamin c, thiamine for suspected thiamine def  -Isolation Precautions, will continue with current therapy, -Incentive spirometer      AGMA  -Resolved    Polycythemia  -Hgb 17.1. Trend CBC, hgb normalized  s/p IVF    Pseudohyponatremia  -resolved    Hyperglycemia, DM2 uncontrolled  -HGA1c 11.7  -BG elevated on steroids, discussed with Diabetic educator, change Lantus 25 and continue  pre meal 7.   Adjust insulin as per BG levels. Patient needs to learn to self inject    Transaminitis  -likely 2/2 Covid v diffuse hepatic steatosis seen on CT  -Trend LFTs  -hold Omega 3 Fatty Acids for now.     CAD, HTN  -BPon low side,  ACE decreased and  hold parameter added  -Continue ASA 325mg PO Q24    DVT ppx. Lovenox    Dispo: Pending improvement in oxygenation.

## 2020-08-23 NOTE — PROGRESS NOTE ADULT - SUBJECTIVE AND OBJECTIVE BOX
PULMONARY PROGRESS NOTE      BOUCHRA HERNANDEZ  MRN-[id number]    Patient is a 50y old  Male who presents with a chief complaint of Acute Hypoxemic Respiratory Failure, COVID19 PNA (23 Aug 2020 13:05)        INTERVAL HPI/OVERNIGHT EVENTS:  Pt seen and examined at the bedside. He feels about the same. Reports having some neck pain. Not coughing up sputum.    MEDICATIONS  (STANDING):  ascorbic acid 1500 milliGRAM(s) Oral daily  aspirin enteric coated 325 milliGRAM(s) Oral daily  chlorhexidine 2% Cloths 1 Application(s) Topical <User Schedule>  dexAMETHasone  Injectable 5 milliGRAM(s) IV Push once  dexAMETHasone  IVPB 10 milliGRAM(s) IV Intermittent daily  dextrose 5%. 1000 milliLiter(s) (50 mL/Hr) IV Continuous <Continuous>  dextrose 50% Injectable 12.5 Gram(s) IV Push once  dextrose 50% Injectable 25 Gram(s) IV Push once  dextrose 50% Injectable 25 Gram(s) IV Push once  enoxaparin Injectable 40 milliGRAM(s) SubCutaneous every 12 hours  insulin glargine Injectable (LANTUS) 25 Unit(s) SubCutaneous at bedtime  insulin lispro (HumaLOG) corrective regimen sliding scale   SubCutaneous three times a day before meals  insulin lispro (HumaLOG) corrective regimen sliding scale   SubCutaneous at bedtime  insulin lispro Injectable (HumaLOG) 7 Unit(s) SubCutaneous three times a day before meals  lisinopril 5 milliGRAM(s) Oral daily  thiamine 100 milliGRAM(s) Oral daily  zinc sulfate 220 milliGRAM(s) Oral daily    MEDICATIONS  (PRN):  acetaminophen   Tablet .. 650 milliGRAM(s) Oral every 4 hours PRN Temp greater or equal to 38.5C (101.3F)  acetaminophen  Suppository .. 650 milliGRAM(s) Rectal every 4 hours PRN Temp greater or equal to 38C (100.4F)  ALBUTerol    90 MICROgram(s) HFA Inhaler 2 Puff(s) Inhalation every 4 hours PRN Shortness of Breath and/or Wheezing  dextrose 40% Gel 15 Gram(s) Oral once PRN Blood Glucose LESS THAN 70 milliGRAM(s)/deciliter  glucagon  Injectable 1 milliGRAM(s) IntraMuscular once PRN Glucose LESS THAN 70 milligrams/deciliter  ondansetron Injectable 4 milliGRAM(s) IV Push every 6 hours PRN Nausea and/or Vomiting    Allergies    No Known Allergies    Intolerances    Paper tray only (Unknown)    PAST MEDICAL & SURGICAL HISTORY:  DM (diabetes mellitus)  Hypertriglyceridemia  Hypertension  No significant past surgical history        REVIEW OF SYSTEMS:  Negative except as noted in HPI      Vital Signs Last 24 Hrs  T(C): 36.9 (23 Aug 2020 04:59), Max: 36.9 (23 Aug 2020 04:59)  T(F): 98.4 (23 Aug 2020 04:59), Max: 98.4 (23 Aug 2020 04:59)  HR: 67 (23 Aug 2020 04:59) (67 - 103)  BP: 104/63 (23 Aug 2020 04:59) (104/63 - 119/75)  BP(mean): --  RR: 19 (23 Aug 2020 04:59) (18 - 19)  SpO2: 86% (23 Aug 2020 09:23) (86% - 97%)      PHYSICAL EXAMINATION:  GEN: In no apparent distress, able to communicate in full sentences  HEENT: NC/AT  NECK: Supple, non-tender; R-neck crepitus noted  CV: +S1, S2, RRR  RESPIRATORY: B/L coarse breath sounds with good inspiratory effort, non-labored breathing  ABDOMEN: Soft, NT/ND, +BS  EXTREMITIES: No rashes, lesions, or pitting edema noted  NEURO: No focal deficits, grossly intact  PSYCH: Normal affect      LABS:    08-23    x   |  x   |  x   ----------------------------<  x   x    |  x   |  0.47<L>      TPro  6.1<L>  /  Alb  2.9<L>  /  TBili  0.5  /  DBili  0.1  /  AST  15  /  ALT  24  /  AlkPhos  86  08-23                Serum Pro-Brain Natriuretic Peptide: 40 pg/mL (08-21-20 @ 07:05)          MICROBIOLOGY:  COVID-19  Antibody - for prior infection screening (08.14.20 @ 01:01)    COVID-19 IgG Antibody Index: 0.30: Roche ECLIA Total AB ([facility])  NOTE: This result index represents a total antibody measurement,  which  includes IgG, IgA, and IgM  Negative <= 0.99 Index  Positive >= 1.00 Index Index    COVID-19 IgG Antibody Interpretation: Negative: This test has not been reviewed by the FDA by the standard review  process. It has been authorized for emergency use by the FDA. [place] has validated this test to be accurate.  Negative results do not rule out SARS-CoV-2 infection, particularly in  those who have been in recent contact with the virus. Follow-up testing  with a molecular diagnostic test should be considered to rule out  infection in these individuals.  Results from antibody testing should not be used as thesole basis to  diagnose or exclude SARS-CoV-2 infection, or to inform infection status.  Positive results may rarely be due to past or present infection with  non-SARS-CoV-2 coronavirus strains, such as coronavirus HKU1, NL63, OC43,  or 229E. [place], through extensive validation  testing, has confirmed that this risk is minimal with this test.        RADIOLOGY & ADDITIONAL STUDIES:  < from: CT Angio Chest w/ IV Cont (08.21.20 @ 16:45) >  FINDINGS:    LUNGS AND AIRWAYS: Patent central airways. Probable tracheal diverticulum again noted. Diffuse bilateral lung opacities including diffuse bilateral groundglass opacities with more dense areas of consolidation in the lower lungs, increased since 8/12/2020.  PLEURA: No pleural effusion.  MEDIASTINUM AND LUH: There is new pneumomediastinum including the prevascular space, aorticopulmonary window, paratracheal and subcarinal region, along the right heart border, and adjacent to the esophagus, greatest distally. Additional droplets of air are seen in the subcutaneous tissues of the right lower neck.  VESSELS: Evaluation of the pulmonary arteries demonstrates no pulmonary lesion. Thoracic aorta normal in caliber.  HEART: Heart size is normal. No pericardial effusion.  CHEST WALL AND LOWER NECK: See above. No enlarged axillary lymph nodes.  VISUALIZED UPPER ABDOMEN: Fatty infiltration of the liver.  BONES: Degenerative changes in the spine.    IMPRESSION:  No pulmonary embolism.    New pneumomediastinum and subcutaneous emphysema in the right lower neck.    Diffuse bilateral lung opacities, worsened since 8/12/2020 compatible with multifocal infection including viral pneumonia.    The findings were discussed with Dr. Villeda on 8/21/2020 5:19 PM    < end of copied text >      ECHO:

## 2020-08-23 NOTE — PROGRESS NOTE ADULT - ASSESSMENT
49 y/o M with PMHX HTN, HLD/Hypertriglyercidemia, DM2 BIBEMS to [facility] ER c/o SOB/MIGUEL, non-productive cough, fever/chills, associated with pleuritic CP which began 6 days ago and has progressively worsened. Patient seen in ER 8/8/20 and had COVID19 PCR screening and was rx'd Amoxicillin at that time. COVID19 PCR positive 8/8/20. Patient hypoxic on arrival 02sat noted 86%. Placed on 6L NC with moderate improvement but subsequently desatted with minimal exertion and was placed in prone position. Currently satting 97% on O2 via NC 6L in prone position. +Sick Contacts similar symptoms at home x2 Wife and Daughter. No tobacco or drug abuse. No other complaints. Denies current CP, HA/Dizziness/lightheadedness, focal deficits, n/v/d, abd pain, or urinary complaints. ROS negative unless mentioned above. Patient is day 9 and requiring increase     *Acute hypoxemic respiratory failure secondary to Covid pneumonia  CTA neg for PE, extensive bilateral pna and ground glass opacities  Repeat CTA , New pneumomediastinum and subcutaneous emphysema in the right lower neck and worsening opacities  Blood culture neg   Pul and ID consult appreciated  self pron q20min   Cont Remdesivir per ID for total 10d   Increased dose of steroid per ID   Isolation precaution   Incentive spirometry     *AGMA  resolved    *Polycythemia  Resolved   s/p IVF     *DM   HgA1C 11.7   Lantus 25   premeal 7     *Transaminitis  likely secondary to covid   trend LFT     *CAD, HTN  bp on low side  ACE decreased  cont ASA 325q24    *DVT ppx   Lovenox

## 2020-08-23 NOTE — PROGRESS NOTE ADULT - SUBJECTIVE AND OBJECTIVE BOX
HPI  Pt is a 51yo M admitted to [facility] for covid pna  Pt was seen and examined at bedside. Pt states he is feeling better today than yesterday. Pt overnight desat to 60s, and this morning low 80s and now upper 80s. Instructed pt to stay prone and use incentive spirometry.     Vital Signs Last 24 Hrs  T(C): 36.9 (23 Aug 2020 04:59), Max: 36.9 (23 Aug 2020 04:59)  T(F): 98.4 (23 Aug 2020 04:59), Max: 98.4 (23 Aug 2020 04:59)  HR: 67 (23 Aug 2020 04:59) (67 - 103)  BP: 104/63 (23 Aug 2020 04:59) (104/63 - 119/75)  BP(mean): --  RR: 19 (23 Aug 2020 04:59) (18 - 19)  SpO2: 86% (23 Aug 2020 09:23) (86% - 97%)    I&O's Summary    22 Aug 2020 07:01  -  23 Aug 2020 07:00  --------------------------------------------------------  IN: 0 mL / OUT: 600 mL / NET: -600 mL        CAPILLARY BLOOD GLUCOSE      POCT Blood Glucose.: 176 mg/dL (23 Aug 2020 12:40)  POCT Blood Glucose.: 170 mg/dL (23 Aug 2020 08:17)  POCT Blood Glucose.: 266 mg/dL (22 Aug 2020 20:58)  POCT Blood Glucose.: 221 mg/dL (22 Aug 2020 18:13)      PHYSICAL EXAM:    GENERAL: Middle age male looking comfortable   HEENT: PERRL, +EOMI  NECK: soft, Supple, No JVD,   CHEST/LUNG: Decrease air entry bilaterally; No wheezing  HEART: S1S2+, Regular rate and rhythm; No murmurs  ABDOMEN: Soft, Nontender, Nondistended; Bowel sounds present  EXTREMITIES:  1+ Peripheral Pulses, No edema  SKIN: No rashes or lesions  NEURO: AAOX3, no focal deficits, no motor r sensory loss  PSYCH: normal mood    MEDICATIONS:  MEDICATIONS  (STANDING):  ascorbic acid 1500 milliGRAM(s) Oral daily  aspirin enteric coated 325 milliGRAM(s) Oral daily  dextrose 5%. 1000 milliLiter(s) (50 mL/Hr) IV Continuous <Continuous>  dextrose 50% Injectable 12.5 Gram(s) IV Push once  dextrose 50% Injectable 25 Gram(s) IV Push once  dextrose 50% Injectable 25 Gram(s) IV Push once  enoxaparin Injectable 40 milliGRAM(s) SubCutaneous every 12 hours  insulin glargine Injectable (LANTUS) 25 Unit(s) SubCutaneous at bedtime  insulin lispro (HumaLOG) corrective regimen sliding scale   SubCutaneous three times a day before meals  insulin lispro (HumaLOG) corrective regimen sliding scale   SubCutaneous at bedtime  insulin lispro Injectable (HumaLOG) 7 Unit(s) SubCutaneous three times a day before meals  lisinopril 5 milliGRAM(s) Oral daily  methylPREDNISolone sodium succinate Injectable 60 milliGRAM(s) IV Push every 12 hours  thiamine 100 milliGRAM(s) Oral daily  zinc sulfate 220 milliGRAM(s) Oral daily      LABS: All Labs Reviewed:    08-23    x   |  x   |  x   ----------------------------<  x   x    |  x   |  0.47<L>      TPro  6.1<L>  /  Alb  2.9<L>  /  TBili  0.5  /  DBili  0.1  /  AST  15  /  ALT  24  /  AlkPhos  86  08-23          Blood Culture:     RADIOLOGY/EKG:    DVT PPX:    ADVANCED DIRECTIVE:    DISPOSITION:

## 2020-08-23 NOTE — CONSULT NOTE ADULT - SUBJECTIVE AND OBJECTIVE BOX
Patient is a 50y old  Male who presents with a chief complaint of Acute Hypoxemic Respiratory Failure, COVID19 PNA (23 Aug 2020 12:55)      BRIEF HOSPITAL COURSE: 51 y/o male with pmhx of HTN, HLD, DM II who was admitted on 8/12 with COVID-19 pneumonia with hospital course complicated by acute hypoxic respiratory failure and ARDS. He has received remdesivir, solumedrol, thiamine, zinc, folic acid. His sats have been stable on HFNC 100% until today when he desatted to 82% and did not improve, prompting MICU consult. Placed patient on 100% NRB on top of HFNC with improvement in sats to 92%. His inflammatory markers are increasing. he is mildly tachypneic to 30 and tachycardic to 103 but appears comfortable and is speaking in full sentences. Transfer to [address] [place] [address] ICU.    Events last 24 hours: as above    PAST MEDICAL & SURGICAL HISTORY:  DM (diabetes mellitus)  Hypertriglyceridemia  Hypertension  No significant past surgical history    Allergies    No Known Allergies    Intolerances    Paper tray only (Unknown)    FAMILY HISTORY:  No pertinent family history in first degree relatives      Family history otherwise noncontributory.      Review of Systems:  CONSTITUTIONAL: No fever, chills, or fatigue  EYES: No eye pain, visual disturbances, or discharge  ENMT:  No difficulty hearing, tinnitus, vertigo; No sinus or throat pain  NECK: No pain or stiffness  RESPIRATORY: No cough, wheezing, chills or hemoptysis; No shortness of breath  CARDIOVASCULAR: No chest pain, palpitations, dizziness, or leg swelling  GASTROINTESTINAL: No abdominal or epigastric pain. No nausea, vomiting, or hematemesis; No diarrhea or constipation. No melena or hematochezia.  GENITOURINARY: No dysuria, frequency, hematuria, or incontinence  NEUROLOGICAL: No headaches, memory loss, loss of strength, numbness, or tremors  SKIN: No itching, burning, rashes, or lesions   MUSCULOSKELETAL: No joint pain or swelling; No muscle, back, or extremity pain  PSYCHIATRIC: No depression, anxiety, mood swings, or difficulty sleeping    All other review of systems negative except as mentioned in HPI.      Social History: no illicit drug use. no smoking.      Medications:    lisinopril 5 milliGRAM(s) Oral daily    ALBUTerol    90 MICROgram(s) HFA Inhaler 2 Puff(s) Inhalation every 4 hours PRN    acetaminophen   Tablet .. 650 milliGRAM(s) Oral every 4 hours PRN  acetaminophen  Suppository .. 650 milliGRAM(s) Rectal every 4 hours PRN  ondansetron Injectable 4 milliGRAM(s) IV Push every 6 hours PRN      aspirin enteric coated 325 milliGRAM(s) Oral daily  enoxaparin Injectable 40 milliGRAM(s) SubCutaneous every 12 hours        dexAMETHasone  Injectable 5 milliGRAM(s) IV Push once  dexAMETHasone  IVPB 10 milliGRAM(s) IV Intermittent daily  dextrose 40% Gel 15 Gram(s) Oral once PRN  dextrose 50% Injectable 12.5 Gram(s) IV Push once  dextrose 50% Injectable 25 Gram(s) IV Push once  dextrose 50% Injectable 25 Gram(s) IV Push once  glucagon  Injectable 1 milliGRAM(s) IntraMuscular once PRN  insulin glargine Injectable (LANTUS) 25 Unit(s) SubCutaneous at bedtime  insulin lispro (HumaLOG) corrective regimen sliding scale   SubCutaneous three times a day before meals  insulin lispro (HumaLOG) corrective regimen sliding scale   SubCutaneous at bedtime  insulin lispro Injectable (HumaLOG) 7 Unit(s) SubCutaneous three times a day before meals    ascorbic acid 1500 milliGRAM(s) Oral daily  dextrose 5%. 1000 milliLiter(s) IV Continuous <Continuous>  thiamine 100 milliGRAM(s) Oral daily  zinc sulfate 220 milliGRAM(s) Oral daily      chlorhexidine 2% Cloths 1 Application(s) Topical <User Schedule>            ICU Vital Signs Last 24 Hrs  T(C): 36.9 (23 Aug 2020 04:59), Max: 36.9 (23 Aug 2020 04:59)  T(F): 98.4 (23 Aug 2020 04:59), Max: 98.4 (23 Aug 2020 04:59)  HR: 67 (23 Aug 2020 04:59) (67 - 103)  BP: 104/63 (23 Aug 2020 04:59) (104/63 - 119/75)  BP(mean): --  ABP: --  ABP(mean): --  RR: 19 (23 Aug 2020 04:59) (18 - 19)  SpO2: 86% (23 Aug 2020 09:23) (86% - 97%)    Vital Signs Last 24 Hrs  T(C): 36.9 (23 Aug 2020 04:59), Max: 36.9 (23 Aug 2020 04:59)  T(F): 98.4 (23 Aug 2020 04:59), Max: 98.4 (23 Aug 2020 04:59)  HR: 67 (23 Aug 2020 04:59) (67 - 103)  BP: 104/63 (23 Aug 2020 04:59) (104/63 - 119/75)  BP(mean): --  RR: 19 (23 Aug 2020 04:59) (18 - 19)  SpO2: 86% (23 Aug 2020 09:23) (86% - 97%)        I&O's Detail    22 Aug 2020 07:01  -  23 Aug 2020 07:00  --------------------------------------------------------  IN:  Total IN: 0 mL    OUT:    Voided: 600 mL  Total OUT: 600 mL    Total NET: -600 mL            LABS:    08-23    x   |  x   |  x   ----------------------------<  x   x    |  x   |  0.47<L>      TPro  6.1<L>  /  Alb  2.9<L>  /  TBili  0.5  /  DBili  0.1  /  AST  15  /  ALT  24  /  AlkPhos  86  08-23          CAPILLARY BLOOD GLUCOSE      POCT Blood Glucose.: 176 mg/dL (23 Aug 2020 12:40)        CULTURES:      Physical Examination:    GENERAL: No acute distress.      EYES: Pupils equal, reactive to light.  Symmetric.    EARS, NOSE, THROAT: Normal; supple neck, no lymphadenopathy; trachea midline    PULM: coarse bilaterally, no significant sputum production. No use of accessory respiratory muscles.    CVS: sinus tachycardia, no murmurs, rubs, or gallops    GI: Soft, nondistended, nontender, normoactive bowel sounds, no masses, no guarding    EXTREMITIES: No edema. DP and radial pulses 2+ bilaterally.    SKIN: Warm and well perfused, no rashes noted.    NEURO: Alert, oriented, interactive, nonfocal    DEVICES: none    RADIOLOGY: reviewed    CRITICAL CARE TIME SPENT: 40 minutes of critical care time spent providing medical care for patient's acute illness/conditions that impairs at least one vital organ system and/or poses a high risk of imminent or life threatening deterioration in the patient's condition. It includes time spent evaluating and treating the patient's acute illness as well as time spent reviewing labs, radiology, discussing goals of care with patient and/or patient's family, and discussing the case with a multidisciplinary team in an effort to prevent further life threatening deterioration or end organ damage. This time is independent of any procedures performed.

## 2020-08-23 NOTE — CONSULT NOTE ADULT - ASSESSMENT
49 y/o male with pmxh of HTN, HLD, DM II now with acute hypoxic respiratory failure and severe ARDS secondary to COVID-19 pneumonia.       NEURO: no active issues.     CVS: acute worsening and rise in inflammatory markers. check troponin and TTE r/o covid induced cardiomyopathy. CTA negative for PE x 2, most recently on 8/21. lisinopril decreased today, trend bp.     PULM: sats improved with 100% NRB added on top of 100% HFNC at 40 LPM. currently appears comfortable therefore will hold off on intubation due to association with increased mortality. switch to decadron 10 day course.     GI: consistent carb liquid diet  RENAL: stable renal function  ID: check d-dimer. ID following, considering addition of actemra as he has completed remdesivir.   ENDO: accuchecks ACHS with lantus, premeal, and insulin coverage scale. goal 140-180.   HEME: lovenox 40 mg BID.  DISPO: full code transfer to ICU    discussed with icu dr salvador 51 y/o male with pmxh of HTN, HLD, DM II now with acute hypoxic respiratory failure and severe ARDS secondary to COVID-19 pneumonia.       NEURO: no active issues.     CVS: acute worsening and rise in inflammatory markers. check troponin and TTE r/o covid induced cardiomyopathy. CTA negative for PE x 2, most recently on 8/21. lisinopril decreased today, trend bp.     PULM: sats improved with 100% NRB added on top of 100% HFNC at 40 LPM. currently appears comfortable therefore will hold off on intubation due to association with increased mortality. switch to decadron 10 day course. self prone as tolerated    GI: consistent carb liquid diet  RENAL: stable renal function  ID: check d-dimer. ID following, considering addition of actemra as he has completed remdesivir.   ENDO: accuchecks ACHS with lantus, premeal, and insulin coverage scale. goal 140-180.   HEME: lovenox 40 mg BID.  DISPO: full code transfer to ICU    discussed with icu dr salvador

## 2020-08-23 NOTE — PROGRESS NOTE ADULT - ASSESSMENT
50M PMH HTN, HLD, DM2 who presents with SOB, cough, found with COVID-19 PNA    Impression:  - COVID-19 PNA  - Hypoxic respiratory failure  - Cytokine storm  - Pneumomediastinum    Recommendations:  - Given persistent O2 requirements, with minimal improvement and worsening of inflammatory markers, will transfer pt to MICU  - Pt is on 100% Vapotherm, added 100% NRB on top of this with improvement in O2 saturations  - Goal SpO2 >=87%  - Continue awake proning  - C/w steroids  - C/w trending inflammatory markers  - Discussed w ID re: adding tocilizumab - would check PCT and if negative, would consider starting it; acknowledging there will be a risk of infection  - Likely minimal benefit of Remdesivir at this point in time  - Would check TTE to monitor for viral-induced cardiomyopathy  - DVT PPx  - [facility]    Heriberto Skelton M.D.  Pulmonary & Critical Care Attending  [facility]  Pulmonary Medicine at [place]  [address]  [place] [address]  T: [phone]  F: [phone]

## 2020-08-23 NOTE — PROGRESS NOTE ADULT - ATTENDING COMMENTS
A total of 25 minutes were spent on the entire encounter. Greater than 50% of the time was spent reviewing labs, notes, orders, radiographic studies, as well as counseling and coordinating care with the relevant multidisciplinary team, including with the primary and consulting providers.
A total of 30 minutes were spent on the entire encounter. Greater than 50% of the time was spent reviewing labs, notes, orders, radiographic studies, as well as counseling and coordinating care with the relevant multidisciplinary team, including with the primary and consulting providers.
A total of 35 minutes were spent on the entire encounter. Greater than 50% of the time was spent reviewing labs, notes, orders, radiographic studies, as well as counseling and coordinating care with the relevant multidisciplinary team, including with the primary and consulting providers.
Patient is seen and evaluated, case discussed with NP agree with assessment and plan  Patient is doing ok, he does not feel any worsening   Decrease air entry b/l on lung exam  he require more oxygen, will get CT angio to better understand his situation.
Patient is seen and evaluated, case discussed with NP agree with assessment and plan  Patient is doing ok, he does not feel any worsening   Decrease air entry b/l on lung exam  he require more oxygen, will get CT angio to better understand his situation.
Patient is seen and evaluated, case discussed with NP, agree with assessment and Plan  Patient is feeling ok, he is being weaned off from high flow  CTA b/l, some decrease air entry   Will continue with Supplemental oxygen and supportive measure.
I have personally seen and examined patient.  Above note reviewed and discussed with NP, modified where appropriate. Pt c/o SOB which is worse than yesterday. Oxygen saturation 88-91% on 50% VM. Pulmonary consult called, will change to Vapotherm 40%, ABG ordered  D-dimer < 150. Self Prone Protocol q20 mins. trend inflammatory markers. CRP trending down. ID following, on Remdesivir day 2/5  Increased dose of steroids as per ID recs. Initially was on Decadron 6mg IV daily without improvement. -Tylenol PRN for Fever  VTE PPX Lovenox 40mg subq BID due to pro-thrombotic state with COVID. Zinc Sulfate 220mg PO q24, Vitamin c, thiamine for suspected thiamine def  Insulin doses adjusted as per BG levels. A1c 11. Diabetic teaching ordered.    PHYSICAL EXAM-  GENERAL: mild resp distress, +vapotherm  HEAD:  Atraumatic, Normocephalic  EYES: EOMI, PERRLA, conjunctiva and sclera clear  NECK: Supple, No JVD  NERVOUS SYSTEM:  Alert & Oriented X3, Motor Strength 5/5 B/L upper and lower extremities; DTRs 2+ intact and symmetric  CHEST/LUNG: decreased BS b/l  HEART: Regular rate and rhythm; No murmurs, rubs, or gallops  ABDOMEN: Soft, Nontender, Nondistended; Bowel sounds present  EXTREMITIES:  2+ Peripheral Pulses, No clubbing, cyanosis, or edema  SKIN: No rashes or lesions.

## 2020-08-24 NOTE — PROGRESS NOTE ADULT - SUBJECTIVE AND OBJECTIVE BOX
Patient is a 50y old  Male who presents with a chief complaint of Acute Hypoxemic Respiratory Failure, COVID19 PNA (23 Aug 2020 13:26)      BRIEF HOSPITAL COURSE:   50M with PMHx of HLD, DM, HTN who was admitted with hypoxia and SOB. Found to have COVID-19 PNA. Escalating O2 requirements, placed on HFNC.      Events last 24 hours: Worsening CT scan findings, inflammatory markers rising, ongoing desats, requiring NRB on top of HFNC for desats. Upgraded to ICU earlier yesterday, IV lasix given with ~1500ml diuresis. Pt with improvements in hypoxia, weaned off NRB to HFNC, hemodynamics stable. States he is feeling better now.      PAST MEDICAL & SURGICAL HISTORY:  DM (diabetes mellitus)  Hypertriglyceridemia  Hypertension  No significant past surgical history        Hosp day #12d    HFNC 70L and 100% FiO2      Vital signs / Reviewed and Physical Exam Performed where pertinent and urgently required    Lab / Radiology  studies / ABG / Meds -  reviewed and interpreted into the assessment and treatment plan.    I&O's Summary    22 Aug 2020 07:01  -  23 Aug 2020 07:00  --------------------------------------------------------  IN: 0 mL / OUT: 600 mL / NET: -600 mL    23 Aug 2020 07:01  -  24 Aug 2020 01:58  --------------------------------------------------------  IN: 170 mL / OUT: 2025 mL / NET: -1855 mL        Impression:  1. acute hypoxemic respiratory failure  2. COVID-19 PNA  3. ARDS  4. diabetes mellitus/hyperglycemia    Plan:  Neuro - nonfocal, avoid neurosuppressants    CV -  hemodynamics stable          cont anti-HTN          Echo in am to evaluate LV function            Pulm -  actively titrating FiO2 to keep sats>87%, utilization of higher flow rates for PEEP recruitment             completed Remdemsivir             cont decadron             encouraging self proning as tolerates [id number]             remains at high risk for decompensation with need for intubation if worsens             f/u with Pulm    GI -  PO diet as tolerates    Renal - Cr stable, shoot for negative fluid balance as tolerated by hemodynamics and renal fx, avoiding fluid challenges, trend Cr    Heme -  Pharmacologic DVT PPx  in addition to SCD's, no signs of active bleeding, CTA chest without signs of PE    ID - check procal, cont to monitor off abx, evaluating for possible Actemera use pending r/o superinfection, f/u with ID    Endo -  Aggressive glycemic control to maintains euglycemia, still with BS >200, increase lantus to 27u daily, ISS coverage, adjustments based on 24hour requirements.      COVID 19 specific considerations and therapeutic  options based on the available and rapidly changing literature    35  Minutes of critical care time spent in the management of this critically ill COVID-19 patient, patient with continuous assessments and interventions based on the interpretation of multiple databases.

## 2020-08-24 NOTE — CHART NOTE - NSCHARTNOTEFT_GEN_A_CORE
Source: Patient [ ]  Family [ ]   other [ x]    Current Diet: Diet, Consistent Carbohydrate Full Liquid (08-23-20 @ 13:18)    PO intake: likely not meeting estimated energy needs     Current Weight:   (8/12) 186 lbs  -Obtain daily wts, continue to monitor. Generalized 1+ edema noted.     Pertinent Medications: MEDICATIONS  (STANDING):  ascorbic acid 1500 milliGRAM(s) Oral daily  aspirin enteric coated 325 milliGRAM(s) Oral daily  chlorhexidine 2% Cloths 1 Application(s) Topical <User Schedule>  dexAMETHasone  IVPB 10 milliGRAM(s) IV Intermittent daily  dextrose 5%. 1000 milliLiter(s) (50 mL/Hr) IV Continuous <Continuous>  dextrose 50% Injectable 12.5 Gram(s) IV Push once  dextrose 50% Injectable 25 Gram(s) IV Push once  dextrose 50% Injectable 25 Gram(s) IV Push once  enoxaparin Injectable 40 milliGRAM(s) SubCutaneous every 12 hours  insulin glargine Injectable (LANTUS) 27 Unit(s) SubCutaneous at bedtime  insulin lispro (HumaLOG) corrective regimen sliding scale   SubCutaneous three times a day before meals  insulin lispro (HumaLOG) corrective regimen sliding scale   SubCutaneous at bedtime  insulin lispro Injectable (HumaLOG) 7 Unit(s) SubCutaneous three times a day before meals  lisinopril 5 milliGRAM(s) Oral daily  thiamine 100 milliGRAM(s) Oral daily  zinc sulfate 220 milliGRAM(s) Oral daily    MEDICATIONS  (PRN):  acetaminophen   Tablet .. 650 milliGRAM(s) Oral every 4 hours PRN Temp greater or equal to 38.5C (101.3F)  acetaminophen  Suppository .. 650 milliGRAM(s) Rectal every 4 hours PRN Temp greater or equal to 38C (100.4F)  ALBUTerol    90 MICROgram(s) HFA Inhaler 2 Puff(s) Inhalation every 4 hours PRN Shortness of Breath and/or Wheezing  dextrose 40% Gel 15 Gram(s) Oral once PRN Blood Glucose LESS THAN 70 milliGRAM(s)/deciliter  glucagon  Injectable 1 milliGRAM(s) IntraMuscular once PRN Glucose LESS THAN 70 milligrams/deciliter  ondansetron Injectable 4 milliGRAM(s) IV Push every 6 hours PRN Nausea and/or Vomiting    Pertinent Labs: CBC Full  -  ( 23 Aug 2020 14:40 )  WBC Count : 17.23 K/uL  RBC Count : 5.23 M/uL  Hemoglobin : 16.0 g/dL  Hematocrit : 48.1 %  Platelet Count - Automated : 594 K/uL  Mean Cell Volume : 92.0 fl  Mean Cell Hemoglobin : 30.6 pg  Mean Cell Hemoglobin Concentration : 33.3 gm/dL  Auto Neutrophil # : x  Auto Lymphocyte # : x  Auto Monocyte # : x  Auto Eosinophil # : x  Auto Basophil # : x  Auto Neutrophil % : x  Auto Lymphocyte % : x  Auto Monocyte % : x  Auto Eosinophil % : x  Auto Basophil % : x  08-24 Nan/a   Glu n/a   K+ n/a   Cr  0.58 mg/dL BUN n/a   Phos n/a   Alb 3.3 g/dL PAB n/a       Skin: intact    Estimated Needs:   [x ] no change since previous assessment  [ ] recalculated:     Current Nutrition Diagnosis: Pt remains at high nutrition risk and meets criteria for Increased Nutrient Needs related to increased physiological demand to promote healing as evidenced by acute hypoxic respiratory failure and ARDS 2/2 COVID19. Pt transferred to COVID ICU today, per documentation pt with improvements in hypoxia, weaned off NRB to HFNC. Pt now receiving full liquid diet. Last documented BM 8/23.     Recommendations:   1) Advanced diet to consistent carbohydrate when medically feasible  2) Add glucerna TID to optimize po intake and provide an additional 220kcal, 10g protein per serving   3) Encourage po intake and HBV protein  4) Monitor wts and labs  5) Continue thiamine and zinc    Monitoring and Evaluation:   [x ] PO intake [ x] Tolerance to diet prescription [X] Weights  [X] Follow up per protocol [X] Labs:

## 2020-08-24 NOTE — PROGRESS NOTE ADULT - SUBJECTIVE AND OBJECTIVE BOX
[facility]  INFECTIOUS DISEASES AND INTERNAL MEDICINE at [place]  =======================================================  Quinn Rivera MD  Diplomates American Board of Internal Medicine and Infectious Diseases  Tel: [phone]      Fax: [phone]  =======================================================    HERNANDEZSOPHIA HERRERAO [id number]    Follow up: covid  transferred to ICU  now only on hiflo  feels "good"    Allergies:  No Known Allergies  Paper tray only (Unknown)      Medications:  acetaminophen   Tablet .. 650 milliGRAM(s) Oral every 4 hours PRN  acetaminophen  Suppository .. 650 milliGRAM(s) Rectal every 4 hours PRN  ALBUTerol    90 MICROgram(s) HFA Inhaler 2 Puff(s) Inhalation every 4 hours PRN  ascorbic acid 1500 milliGRAM(s) Oral daily  aspirin enteric coated 325 milliGRAM(s) Oral daily  chlorhexidine 2% Cloths 1 Application(s) Topical <User Schedule>  dexAMETHasone  IVPB 10 milliGRAM(s) IV Intermittent daily  dextrose 40% Gel 15 Gram(s) Oral once PRN  dextrose 5%. 1000 milliLiter(s) IV Continuous <Continuous>  dextrose 50% Injectable 12.5 Gram(s) IV Push once  dextrose 50% Injectable 25 Gram(s) IV Push once  dextrose 50% Injectable 25 Gram(s) IV Push once  enoxaparin Injectable 40 milliGRAM(s) SubCutaneous every 12 hours  glucagon  Injectable 1 milliGRAM(s) IntraMuscular once PRN  insulin glargine Injectable (LANTUS) 27 Unit(s) SubCutaneous at bedtime  insulin lispro (HumaLOG) corrective regimen sliding scale   SubCutaneous three times a day before meals  insulin lispro (HumaLOG) corrective regimen sliding scale   SubCutaneous at bedtime  insulin lispro Injectable (HumaLOG) 7 Unit(s) SubCutaneous three times a day before meals  lisinopril 5 milliGRAM(s) Oral daily  ondansetron Injectable 4 milliGRAM(s) IV Push every 6 hours PRN  thiamine 100 milliGRAM(s) Oral daily  zinc sulfate 220 milliGRAM(s) Oral daily            REVIEW OF SYSTEMS:  CONSTITUTIONAL:  No Fever or chills  HEENT:   No diplopia or blurred vision.  No earache, sore throat or runny nose.  CARDIOVASCULAR:  No pressure, squeezing, strangling, tightness, heaviness or aching about the chest, neck, axilla or epigastrium.  RESPIRATORY:  No cough, shortness of breath  GASTROINTESTINAL:  No nausea, vomiting or diarrhea.  GENITOURINARY:  No dysuria, frequency or urgency. No Blood in urine  MUSCULOSKELETAL:  no joint aches, no muscle pain  SKIN:  No change in skin, hair or nails.  NEUROLOGIC:  No Headaches, seizures or weakness.  PSYCHIATRIC:  No disorder of thought or mood.  ENDOCRINE:  No heat or cold intolerance  HEMATOLOGICAL:  No easy bruising or bleeding.            Physical Exam:  ICU Vital Signs Last 24 Hrs  T(C): 36.7 (24 Aug 2020 12:00), Max: 37.2 (24 Aug 2020 08:00)  T(F): 98.1 (24 Aug 2020 12:00), Max: 98.9 (24 Aug 2020 08:00)  HR: 70 (24 Aug 2020 15:31) (66 - 96)  BP: 105/71 (24 Aug 2020 13:00) (87/52 - 120/74)  BP(mean): 68 (23 Aug 2020 21:00) (64 - 76)  ABP: --  ABP(mean): --  RR: 28 (24 Aug 2020 13:00) (22 - 35)  SpO2: 86% (24 Aug 2020 15:31) (86% - 99%)      GEN: NAD, pleasant on h i flow  HEENT: normocephalic and atraumatic. EOMI. PERRL.  Anicteric   NECK: Supple.   LUNGS: Clear to auscultation.  HEART: Regular rate and rhythm without murmur.  ABDOMEN: Soft, nontender, and nondistended.  Positive bowel sounds.    : No CVA tenderness  EXTREMITIES: Without any edema.  MSK: no joint swelling  NEUROLOGIC: Cranial nerves II through XII are grossly intact. No focal deficits  PSYCHIATRIC: Appropriate affect .  SKIN: No Rash      Labs:                          16.0   17.23 )-----------( 594      ( 23 Aug 2020 14:40 )             48.1   08-24    x   |  x   |  x   ----------------------------<  x   x    |  x   |  0.58    Ca    8.9      23 Aug 2020 14:40  Phos  3.7     08-23  Mg     2.3     08-23    TPro  6.6  /  Alb  3.3  /  TBili  0.6  /  DBili  0.2  /  AST  16  /  ALT  28  /  AlkPhos  98  08-24    < from: CT Angio Chest w/ IV Cont (08.21.20 @ 16:45) >  IMPRESSION:  No pulmonary embolism.    New pneumomediastinum and subcutaneous emphysema in the right lower neck.    Diffuse bilateral lung opacities, worsened since 8/12/2020 compatible with multifocal infection including viral pneumonia.    The findings were discussed with Dr. Villeda on 8/21/2020 5:19 PM      < end of copied text >

## 2020-08-24 NOTE — PROGRESS NOTE ADULT - ASSESSMENT
Acute hypoxic respiratory failure   ARDS  COVID 19 +  Viral/ multifocal PNA  Pneumomediastinum    Remains on HFNC 100% +/- NRB, self proning as tolerated. On Decadron and inh albuterol PRN. Maintain SpO2 > 88%. Conservative management for the pneumomediastinum. Will hold off on intubation till absolutely necessary.   Completed Remdesivir (10 day course). ID f/u. Trend inflammatory markers q48hrs  Lasix 40mg IVP PRN to maintain net negative fluid balance   Avoid nephrotoxic agents. Strict I&O with Cr monitoring   Aggressive glycemic control, maintain BG < 180mg/dl. On lantus 27U HS, Humalog 7U TID AC and ISS  DVT ppx w/ Lovenox 40mg BID  On consistent card diet. Aspiration precautions  Contact/airborne isolation

## 2020-08-24 NOTE — PROGRESS NOTE ADULT - SUBJECTIVE AND OBJECTIVE BOX
Patient is a 50y old  Male who presents with a chief complaint of Acute Hypoxemic Respiratory Failure, COVID19 PNA (24 Aug 2020 01:19)    BRIEF HOSPITAL COURSE:   51 y/o male with pmhx of HTN, HLD, DM II who was admitted on 8/12 with COVID-19 pneumonia with hospital course complicated by acute hypoxic respiratory failure and ARDS. He has received remdesivir, solumedrol, thiamine, zinc, folic acid. His sats have been stable on HFNC 100% until today when he desatted to 82% and did not improve, prompting MICU consult. Placed patient on 100% NRB on top of HFNC with improvement in sats to 92%. His inflammatory markers are increasing. he is mildly tachypneic to 30 and tachycardic to 103 but appears comfortable and is speaking in full sentences. Transfer to [address] [place] [address] ICU.    Events last 24 hours:   SOB improved. Afebrile. Remains on HFNC, taken off NRB. No acute events overnight  Recent CTA w/ no PE, diffuse GGO and R sided pneumomediastinum    PAST MEDICAL & SURGICAL HISTORY:  DM (diabetes mellitus)  Hypertriglyceridemia  Hypertension  No significant past surgical history    Review of Systems:  CONSTITUTIONAL: No fever, chills, or fatigue  EYES: No eye pain, visual disturbances, or discharge  ENMT:  No difficulty hearing, tinnitus, vertigo; No sinus or throat pain  NECK: No pain or stiffness  RESPIRATORY: No cough, wheezing, chills or hemoptysis; +ve shortness of breath  CARDIOVASCULAR: No chest pain, palpitations, dizziness, or leg swelling  GASTROINTESTINAL: No abdominal or epigastric pain. No nausea, vomiting, or hematemesis; No diarrhea or constipation. No melena or hematochezia.  GENITOURINARY: No dysuria, frequency, hematuria, or incontinence  NEUROLOGICAL: No headaches, memory loss, loss of strength, numbness, or tremors  SKIN: No itching, burning, rashes, or lesions   MUSCULOSKELETAL: No joint pain or swelling; No muscle, back, or extremity pain  PSYCHIATRIC: No depression, anxiety, mood swings, or difficulty sleeping    Physical Examination:    ICU Vital Signs Last 24 Hrs  T(C): 36.2 (24 Aug 2020 04:00), Max: 36.2 (24 Aug 2020 00:00)  T(F): 97.2 (24 Aug 2020 04:00), Max: 97.2 (24 Aug 2020 00:00)  HR: 88 (24 Aug 2020 07:00) (66 - 98)  BP: 97/64 (24 Aug 2020 07:00) (87/52 - 119/85)  BP(mean): 68 (23 Aug 2020 21:00) (64 - 81)  ABP: --  ABP(mean): --  RR: 25 (24 Aug 2020 07:00) (22 - 30)  SpO2: 88% (24 Aug 2020 07:00) (86% - 99%)      General: No acute distress.      Neuro: AAO*3, No motor, sensory, or cranial nerve deficit    HEENT: Pupils equal, reactive to light, Moist oral mucosa    PULM: Clear. No significant adventitious breath sounds     CVS: Regular rhythm and controlled rate, no murmurs, rubs, or gallops    ABD: Soft, nondistended, nontender, normoactive bowel sounds    EXT: No b/l LE edema, nontender with pedal pulse palpable     SKIN: Warm and well perfused, no acute rashes       Medications:    lisinopril 5 milliGRAM(s) Oral daily    ALBUTerol    90 MICROgram(s) HFA Inhaler 2 Puff(s) Inhalation every 4 hours PRN    acetaminophen   Tablet .. 650 milliGRAM(s) Oral every 4 hours PRN  acetaminophen  Suppository .. 650 milliGRAM(s) Rectal every 4 hours PRN  ondansetron Injectable 4 milliGRAM(s) IV Push every 6 hours PRN      aspirin enteric coated 325 milliGRAM(s) Oral daily  enoxaparin Injectable 40 milliGRAM(s) SubCutaneous every 12 hours        dexAMETHasone  IVPB 10 milliGRAM(s) IV Intermittent two times a day  dextrose 40% Gel 15 Gram(s) Oral once PRN  dextrose 50% Injectable 12.5 Gram(s) IV Push once  dextrose 50% Injectable 25 Gram(s) IV Push once  dextrose 50% Injectable 25 Gram(s) IV Push once  glucagon  Injectable 1 milliGRAM(s) IntraMuscular once PRN  insulin glargine Injectable (LANTUS) 27 Unit(s) SubCutaneous at bedtime  insulin lispro (HumaLOG) corrective regimen sliding scale   SubCutaneous three times a day before meals  insulin lispro (HumaLOG) corrective regimen sliding scale   SubCutaneous at bedtime  insulin lispro Injectable (HumaLOG) 7 Unit(s) SubCutaneous three times a day before meals    ascorbic acid 1500 milliGRAM(s) Oral daily  dextrose 5%. 1000 milliLiter(s) IV Continuous <Continuous>  thiamine 100 milliGRAM(s) Oral daily  zinc sulfate 220 milliGRAM(s) Oral daily      chlorhexidine 2% Cloths 1 Application(s) Topical <User Schedule>            I&O's Detail    23 Aug 2020 07:01  -  24 Aug 2020 07:00  --------------------------------------------------------  IN:    Oral Fluid: 360 mL    Solution: 100 mL  Total IN: 460 mL    OUT:    Voided: 2025 mL  Total OUT: 2025 mL    Total NET: -1565 mL            RADIOLOGY/ Microbiology/ Labs: reviewed

## 2020-08-24 NOTE — PROGRESS NOTE ADULT - ASSESSMENT
49 y/o M with PMHX HTN, HLD/Hypertriglyercidemia, DM2 BIBEMS to [facility] ER c/o SOB/MIGUEL, non-productive cough, fever/chills, associated with pleuritic CP which began 6 days ago and has progressively worsened. Patient seen in ER 8/8/20 and had COVID19 PCR screening and was rx'd Amoxicillin at that time. COVID19 PCR positive 8/8/20. Patient hypoxic on arrival 02sat noted 86%. Placed on 6L NC with moderate improvement but subsequently desatted with minimal exertion and was placed in prone position. Currently satting 97% on O2 via NC 6L in prone position. +Sick Contacts similar symptoms at home x2 Wife and Daughter.     COVID 19 pneumonia  Acute hypoxic respiratory failure  Fever  Uncontrolled DM      - inflammatory markers elevated, moved to MICU on 8/23 due to worsening hypoxia. briefly on NRB + hiflo. Decadron added. No improved, off of NRB, spo2 91%  - CTA extensive b/l GGO-no PE, repeat CTA 8/21 showed pneumomediastinum/sct emphysema, worsening b/l opacities  - s/p remdesivir x 10 days  - PCT negative, BCX negative  - discussed use of actemra with patient-currently insufficient data for recommendations for/or against anti IL-1/anti IL-6. Would be concerned about infections/bacteremias as a complication. As patient appears clinically improved today would c/w decadron for now and monitor. Can consider if worsening, patient is agreeable to try  - Avoid antibiotics unless there is a concern for a bacterial infection  - VTE prophylaxis per current [facility] COVID 19 protocol  - Check CBC with diff, CMP with LFT's, Inflammatory markers (ESR, CRP, Ferritin, LDH,  procalcitonin)  q48h.    - Encourage self proning q2h and ambulation as tolerated  - Taper off O2 as tolerated- once tolerating room air would ideally monitor for 24h prior to discharge. Continue self quarantine at home x 14 days from date of positive COVID 19 PCR  - Inpatient Contact/Airborne isolation     d/w Dr Gibson  Will Follow

## 2020-08-25 NOTE — PROGRESS NOTE ADULT - SUBJECTIVE AND OBJECTIVE BOX
Patient is a 50y old  Male who presents with a chief complaint of Acute Hypoxemic Respiratory Failure, COVID19 PNA (24 Aug 2020 15:28)      BRIEF HOSPITAL COURSE:  50M with PMHx of HLD, DM, HTN who was admitted with hypoxia and SOB. Found to have COVID-19 PNA. Escalating O2 requirements, placed on HFNC.  Worsening CT scan findings, inflammatory markers rising, ongoing desats, requiring NRB on top of HFNC for desats. Upgraded to ICU, IV lasix given with ~1500ml diuresis.     Events last 24 hours: Worsening hypoxia yesterday, required placement back on NRB in addition to HFNC, overnight with continued desats with dual O2 therapy, attempted partial and full proning without improvements. Pt with increased WOB as well. Placed on NIPPV. Improved sats and WOB.     PAST MEDICAL & SURGICAL HISTORY:  DM (diabetes mellitus)  Hypertriglyceridemia  Hypertension  No significant past surgical history        Hosp day #13d      I&O's Summary    23 Aug 2020 07:01  -  24 Aug 2020 07:00  --------------------------------------------------------  IN: 460 mL / OUT: 2025 mL / NET: -1565 mL    24 Aug 2020 07:01  -  25 Aug 2020 00:34  --------------------------------------------------------  IN: 50 mL / OUT: 1200 mL / NET: -1150 mL        Vital signs / Reviewed and Physical Exam Performed where pertinent and urgently required    Lab / Radiology  studies / ABG / Meds -  reviewed and interpreted into the assessment and treatment plan.    Impression:  1. acute hypoxemic respiratory failure  2. COVID-19 PNA  3. ARDS  4. diabetes mellitus/hyperglycemia    Plan:  Neuro - nonfocal, avoid neurosuppressants    CV -  hemodynamics stable          cont anti-HTN          Echo showed nml function            Pulm -  actively titrating FiO2 to keep sats>87%, placement on NIPPV for WOB and EPAP alveolar recruitment in negative pressure isolation room             completed Remdemsivir             cont decadron, will increase dosing back to 10mg BID             encouraging self proning as tolerates [id number]             remains at high risk for decompensation, if unable to maintain on NIPPV will need to escalate to intubation             f/u with Pulm    GI -  PO, NPO for now while on NIPPV    Renal - Cr stable, repeat IV lasix x 1, cont attempts for negative fluid balance as tolerated by hemodynamics and renal fx, avoiding fluid challenges, trend Cr    Heme -  Pharmacologic DVT PPx  in addition to SCD's, no signs of active bleeding, CTA chest without signs of PE    ID - procal negative, cont to monitor off abx, f/u with ID    Endo -  Aggressive glycemic control to maintains euglycemia, BS improved with increase lantus, cont ISS coverage, adjustments based on 24hour requirements.      35  Minutes of critical care time spent in the management of this critically ill COVID-19 patient, patient with continuous assessments and interventions based on the interpretation of multiple databases.

## 2020-08-25 NOTE — PROGRESS NOTE ADULT - ASSESSMENT
Acute hypoxic respiratory failure   ARDS  COVID 19 +  Viral/ multifocal PNA  Pneumomediastinum    Remains dependant on BiPAP which was started overnight. Currently on 15/12/100%. Not tolerating self proning. On Decadron and given stat lasix today. Target SpO2 > 88%. Conservative management for the pneumomediastinum. CXR requested  Completed Remdesivir (10 day course). ID f/u. Trend inflammatory markers q48hrs  Lasix 40mg today, maintain net negative fluid balance. EF ~ 55%  Avoid nephrotoxic agents. Strict I&O with Cr monitoring   Aggressive glycemic control, maintain BG < 180mg/dl. On lantus 30U HS, Humalog 10U TID AC and ISS  DVT ppx w/ Lovenox 40mg BID  Keep NPO for now  Contact/airborne isolation    His respiratory status is very tenuous and is very likely that he will be intubated in the next 24hrs. Will hold off on intubation till absolutely necessary. This was discussed w/ pt and his daughter Yvonne # [phone]  Critical Care time: 35 minutes assessing presenting problems of acute illness that poses high probability of life threatening deterioration or end organ damage/dysfunction.  Medical decision making including Initiating plan of care, reviewing data, reviewing radiology, discussing with multidisciplinary team, non inclusive of procedures, discussing goals of care with patient/family Acute hypoxic respiratory failure   ARDS  COVID 19 +  Viral/ multifocal PNA  Pneumomediastinum    Remains dependant on BiPAP which was started overnight. Currently on 15/12/100%. Not tolerating self proning. On Decadron and given stat lasix today. Target SpO2 > 88%. Conservative management for the pneumomediastinum. CXR requested  Completed Remdesivir (10 day course). ID f/u. Trend inflammatory markers q48hrs  Lasix 40mg today, maintain net negative fluid balance. EF ~ 55%  Avoid nephrotoxic agents. Strict I&O with Cr monitoring   Aggressive glycemic control, maintain BG < 180mg/dl. On lantus 30U HS, Humalog 10U TID AC and ISS  DVT ppx w/ Lovenox 40mg BID  Keep NPO for now  Contact/airborne isolation    His respiratory status is very tenuous and is very likely that he will be intubated in the next 24-48hrs. Will hold off on intubation till absolutely necessary. This was discussed w/ pt and his daughter Yvonne # [phone]  Critical Care time: 35 minutes assessing presenting problems of acute illness that poses high probability of life threatening deterioration or end organ damage/dysfunction.  Medical decision making including Initiating plan of care, reviewing data, reviewing radiology, discussing with multidisciplinary team, non inclusive of procedures, discussing goals of care with patient/family

## 2020-08-25 NOTE — PROGRESS NOTE ADULT - SUBJECTIVE AND OBJECTIVE BOX
Patient is a 50y old  Male who presents with a chief complaint of Acute Hypoxemic Respiratory Failure, COVID19 PNA (24 Aug 2020 01:19)    BRIEF HOSPITAL COURSE:   49 y/o male with pmhx of HTN, HLD, DM II who was admitted on 8/12 with COVID-19 pneumonia with hospital course complicated by acute hypoxic respiratory failure and ARDS. He has received remdesivir, solumedrol, thiamine, zinc, folic acid. His sats have been stable on HFNC 100% until today when he desatted to 82% and did not improve, prompting MICU consult. Placed patient on 100% NRB on top of HFNC with improvement in sats to 92%. His inflammatory markers are increasing. he is mildly tachypneic to 30 and tachycardic to 103 but appears comfortable and is speaking in full sentences. Transfer to [address] [place] [address] ICU.    Events last 24 hours:   SOB and increased WOB over the past 24hrs. Switched to BiPAP overnight. Afebrile  Recent CTA w/ no PE, diffuse GGO and R sided pneumomediastinum    PAST MEDICAL & SURGICAL HISTORY:  DM (diabetes mellitus)  Hypertriglyceridemia  Hypertension  No significant past surgical history    Review of Systems:  CONSTITUTIONAL: No fever, chills, or fatigue  EYES: No eye pain, visual disturbances, or discharge  ENMT:  No difficulty hearing, tinnitus, vertigo; No sinus or throat pain  NECK: No pain or stiffness  RESPIRATORY: No cough, wheezing, chills or hemoptysis; +ve shortness of breath  CARDIOVASCULAR: No chest pain, palpitations, dizziness, or leg swelling  GASTROINTESTINAL: No abdominal or epigastric pain. No nausea, vomiting, or hematemesis; No diarrhea or constipation. No melena or hematochezia.  GENITOURINARY: No dysuria, frequency, hematuria, or incontinence  NEUROLOGICAL: No headaches, memory loss, loss of strength, numbness, or tremors  SKIN: No itching, burning, rashes, or lesions   MUSCULOSKELETAL: No joint pain or swelling; No muscle, back, or extremity pain  PSYCHIATRIC: No depression, anxiety, mood swings, or difficulty sleeping    Physical Examination:    ICU Vital Signs Last 24 Hrs  T(C): 37 (25 Aug 2020 05:00), Max: 37.1 (24 Aug 2020 15:54)  T(F): 98.6 (25 Aug 2020 05:00), Max: 98.7 (24 Aug 2020 15:54)  HR: 124 (25 Aug 2020 10:00) (70 - 130)  BP: 98/71 (25 Aug 2020 10:00) (91/58 - 135/74)  BP(mean): 77 (25 Aug 2020 10:00) (73 - 92)  ABP: --  ABP(mean): --  RR: 45 (25 Aug 2020 10:00) (25 - 46)  SpO2: 86% (25 Aug 2020 10:00) (84% - 91%)      General: Mild to moderate distress.      Neuro: Moving all extremities. Communicates with gestures    HEENT: Pupils equal, reactive to light, Moist oral mucosa    PULM: Decreased bilaterally air entry    CVS: Regular rhythm and controlled rate, no murmurs, rubs, or gallops    ABD: Soft, nondistended, nontender, normoactive bowel sounds    EXT: No b/l LE edema, nontender with pedal pulse palpable     SKIN: Warm and well perfused, no acute rashes       Medications:    lisinopril 5 milliGRAM(s) Oral daily    ALBUTerol    90 MICROgram(s) HFA Inhaler 2 Puff(s) Inhalation every 4 hours PRN    acetaminophen   Tablet .. 650 milliGRAM(s) Oral every 4 hours PRN  acetaminophen  Suppository .. 650 milliGRAM(s) Rectal every 4 hours PRN  ondansetron Injectable 4 milliGRAM(s) IV Push every 6 hours PRN      aspirin enteric coated 325 milliGRAM(s) Oral daily  enoxaparin Injectable 40 milliGRAM(s) SubCutaneous every 12 hours        dexAMETHasone  IVPB 10 milliGRAM(s) IV Intermittent two times a day  dextrose 40% Gel 15 Gram(s) Oral once PRN  dextrose 50% Injectable 12.5 Gram(s) IV Push once  dextrose 50% Injectable 25 Gram(s) IV Push once  dextrose 50% Injectable 25 Gram(s) IV Push once  glucagon  Injectable 1 milliGRAM(s) IntraMuscular once PRN  insulin glargine Injectable (LANTUS) 27 Unit(s) SubCutaneous at bedtime  insulin lispro (HumaLOG) corrective regimen sliding scale   SubCutaneous three times a day before meals  insulin lispro (HumaLOG) corrective regimen sliding scale   SubCutaneous at bedtime  insulin lispro Injectable (HumaLOG) 7 Unit(s) SubCutaneous three times a day before meals    ascorbic acid 1500 milliGRAM(s) Oral daily  dextrose 5%. 1000 milliLiter(s) IV Continuous <Continuous>  thiamine 100 milliGRAM(s) Oral daily  zinc sulfate 220 milliGRAM(s) Oral daily      chlorhexidine 2% Cloths 1 Application(s) Topical <User Schedule>            I&O's Detail    23 Aug 2020 07:01  -  24 Aug 2020 07:00  --------------------------------------------------------  IN:    Oral Fluid: 360 mL    Solution: 100 mL  Total IN: 460 mL    OUT:    Voided: 2025 mL  Total OUT: 2025 mL    Total NET: -1565 mL            RADIOLOGY/ Microbiology/ Labs: reviewed

## 2020-08-26 NOTE — CHART NOTE - NSCHARTNOTEFT_GEN_A_CORE
eICU consulted for patient.  COVID positive with worsening hypoxemia. Recommended CXR / ABG.  ABG showed 7.3/46/51/21 BE-4 (hypoxemic respiratory failure with mild metabolic acidosis)  CXR (my read) showed pneumothorax and pneumomedistinum. PTX is new. L lung field with significant opacification.  Recommend transfer to ICU, followed by urgent intubation and placement of chest tube.  Will follow.

## 2020-08-26 NOTE — CONSULT NOTE ADULT - ASSESSMENT
50M with COVID pnuemonia, s/p cardiac arrest intubation, transferred to ICU, with multiple organ failure, worsening inflammatory markers, lactic acidosis, vent dependent respiratory failure with grave prognosis.     #1 Ventilator dependence - COVID pnuemonia. poor prognosis  #2 Kidney failure - getting HD now  #3 cardiac arrest - poor prognosis, prolonged.   #4 Shock - multiple pressors. poor prognosis  #5 Encounter for palliative care - grave prognosis, patient acutely decompensated, post cardiac arrest, unlikely to survive. Family has been updated by ICU team and they are all here visiting.

## 2020-08-26 NOTE — PROGRESS NOTE ADULT - ASSESSMENT
Cardiac arrest ~ ROSC 20min  R PTX s/p chest tube  Severe anion gap metabolic acidosis  Lactate acidosis  Shock liver/transaminitis  Acute hypoxic respiratory failure   Shock , ?Distributive vs cardiogenic  ARDS  COVID 19 +  Viral/ multifocal PNA  Pneumomediastinum    Patient seen and examined     Neuro: Maintain RAAS b/w 0 to -3. Currently sedated w/ ketamine and Precedex. Also started on hypothermia protocol  Cardiovascular: Aim for MAP target 65. On three pressors, Levophed @ 1.2, Epi @ 1.0 and Vasopressin Also bloused w/ fluids and started on bicarb gtt. Continue Amiodarone gtt. Trend enzymes  Resp/Chest: Maintain SpO2 > 92%. On Volume AC. Chest tube to suction. Hold off on SAT/SBT for now. Vent bundle. Repeat ABG later today  GI/Nutrition: Keep NPO. IV PPI   ID: Completed Remdesivir (10 day course). ID f/u. Trend inflammatory markers q48hrs. Will hold off on Abx  Renal: D/w renal regarding need for HD given persist acidosis, lactemia and high pressors requirement. Avoid nephrotoxic agents. Strict I&O  Endocrinology: Maintain Blood sugar < 180.  On lantus 30U HS and ISS/ Stopped Humalog. Pt remains on steroids and bicarb gtt  Haem/Oncology:  DVT ppx w/ HSQ    Case was discussed w/ daughter, Yvonne and one of his other sons extensively. Poor prognosis given 3 pressor shock, MOF, severe acidemia and possibility of repeat cardiac was conveyed.  Palliative care consulted/  Critical Care time: 45 minutes assessing presenting problems of acute illness that poses high probability of life threatening deterioration or end organ damage/dysfunction.  Medical decision making including Initiating plan of care, reviewing data, reviewing radiology, discussing with multidisciplinary team, non inclusive of procedures

## 2020-08-26 NOTE — PROCEDURE NOTE - NSPOSTCAREGUIDE_GEN_A_CORE
Care for catheter as per unit/ICU protocols/Verbal/written post procedure instructions were given to patient/caregiver/Instructed patient/caregiver to follow-up with primary care physician/Instructed patient/caregiver regarding signs and symptoms of infection/Keep the cast/splint/dressing clean and dry
Instructed patient/caregiver regarding signs and symptoms of infection/Keep the cast/splint/dressing clean and dry/Care for catheter as per unit/ICU protocols/Instructed patient/caregiver to follow-up with primary care physician/Verbal/written post procedure instructions were given to patient/caregiver
Keep the cast/splint/dressing clean and dry/Verbal/written post procedure instructions were given to patient/caregiver/Instructed patient/caregiver to follow-up with primary care physician/Instructed patient/caregiver regarding signs and symptoms of infection/Care for catheter as per unit/ICU protocols

## 2020-08-26 NOTE — PROGRESS NOTE ADULT - ASSESSMENT
51 y/o M with PMHX HTN, HLD/Hypertriglyercidemia, DM2 BIBEMS to [facility] ER c/o SOB/MIGUEL, non-productive cough, fever/chills, associated with pleuritic CP which began 6 days ago and has progressively worsened. Patient seen in ER 8/8/20 and had COVID19 PCR screening and was rx'd Amoxicillin at that time. COVID19 PCR positive 8/8/20. Patient hypoxic on arrival 02sat noted 86%. Placed on 6L NC with moderate improvement but subsequently desatted with minimal exertion and was placed in prone position. Currently satting 97% on O2 via NC 6L in prone position. +Sick Contacts similar symptoms at home x2 Wife and Daughter.     COVID 19 pneumonia  Acute hypoxic respiratory failure  Pneumothorax  s/p cardiac arrest  shock  MOF    - s/p cardiac arrest, now intubated  - s/p remdesivir x 10 days ,on decadron  - c/w supportive care  - palliative f/u    signing off.

## 2020-08-26 NOTE — PROCEDURE NOTE - NSTIMEOUT_GEN_A_CORE
DOCUMENT CREATED: 2018  1308h  NAME: Adonay Triana (Boy)  CLINIC NUMBER: [id number]  ADMITTED: 2018  HOSPITAL NUMBER: [id number]  DATE OF SERVICE: 2018     AGE: 7 days. POSTMENSTRUAL AGE: 33 weeks 0 days. CURRENT WEIGHT: 1.895 kg (Up   10gm) (4 lb 3 oz) (33.4 percentile). WEIGHT GAIN: 3.8 percent decrease since   birth.        VITAL SIGNS & PHYSICAL EXAM  WEIGHT: 1.895kg (33.4 percentile)  BED: Isolette. TEMP: 98.1-98.6. HR: 129-163. RR: 32-78. BP: 71/36-73/35  URINE   OUTPUT: X8. STOOL: X6.  HEENT: Anterior fontanelle soft and flat. NC and NGT in place without   irritation.  RESPIRATORY: Breath sounds equal and clear bilaterally. Unlabored respiratory   effort.  CARDIAC: Regular rate and rhythm without murmur. Capillary refill brisk.  ABDOMEN: Soft, round with active bowel sounds. Dried umbilical stump in place.  : Normal  male features.  NEUROLOGIC: Appropriate tone and activity.  EXTREMITIES: Good range of motion in all extremities.  SKIN: Pink with good integrity..     NEW FLUID INTAKE  Based on 1.895kg.  FEEDS: Human Milk -  20 kcal/oz 35ml NG q3h  INTAKE OVER PAST 24 HOURS: 145ml/kg/d. TOLERATING FEEDS: Well. ORAL FEEDS: Every   other feeding. TOLERATING ORAL FEEDS: Fair. COMMENTS: Gained weight. Voiding   and stooling adequately. Received 149ml/kg/day for 99cal/kg/day. PLANS: Continue   current feeds.     CURRENT MEDICATIONS  Multivitamins with iron 0.5ml NGT BID started on 2018     RESPIRATORY SUPPORT  SUPPORT: Nasal cannula since 2018  FLOW: 0.5 l/min  FiO2: 0.21-0.28  CBG 2018  04:40h: pH:7.31  pCO2:52  pO2:45  Bicarb:26.2  BE:0.0  APNEA SPELLS: 0 in the last 24 hours. BRADYCARDIA SPELLS: 0 in the last 24   hours.     CURRENT PROBLEMS & DIAGNOSES  PREMATURITY - 28-37 WEEKS  ONSET: 2018  STATUS: Active  COMMENTS: Day of life 7 or 33 weeks corrected gestational age. Stable   temperatures in isolette.  PLANS: Provide developmentally supportive care as  Patient's first and last name, , procedure, and correct site confirmed prior to the start of procedure. tolerated. Start multivitamin   with iron.  RESPIRATORY DISTRESS SYNDROME  ONSET: 2018  STATUS: Active  COMMENTS: S/P curosurf x1. Remains 1LPM nasal cannula with FiO2<30% in past 24   hours. Adequate AM CBG.  PLANS: Wean to 0.5 LPM and follow closely clinically.     TRACKING   SCREENING: Last study on 2018: Pending.  FURTHER SCREENING: Car seat screen indicated and hearing screen indicated.     NOTE CREATORS  DAILY ATTENDING: Sadie De Leon MD  PREPARED BY: Sadie De Leon MD                 Electronically Signed by Sadie De Leon MD on 2018 [id number].

## 2020-08-26 NOTE — PROGRESS NOTE ADULT - SUBJECTIVE AND OBJECTIVE BOX
pt is a 50 year old gentleman, covid + with multiple medical problems. called by PA for an emergency intubation for impending respiratory failure. VS , /72, sat 87%. tachypneic on bipap. pt started on 0.1 ug/kg/min of levophed.   10mg etomidate, 20mg propofol, 100 mg succinylcholine administered. glidescope #3 . grade 1 view. 8.0 mm ETT placed atraumatically. tube taped 23 cm at the lips.  VSS stable after intubation. /70, , Sat 89% on 100% FiO2. As I was leaving the room, i noticed a wide complex arrythmia developing.  code blue activated.     Dr. Juan Momin

## 2020-08-26 NOTE — PROGRESS NOTE ADULT - SUBJECTIVE AND OBJECTIVE BOX
49 y/o male with pmhx of HTN, HLD, DM II who was admitted on 8/12 with COVID-19 pneumonia with hospital course complicated by acute hypoxic respiratory failure and ARDS. He has received remdesivir, solumedrol, thiamine, zinc, folic acid. A few days ago patient was transferred to the ICU for worsening hypoxic respiratory failure placed on HFNC and attempted to prone. His respiratory status remained poor, he remained tachypneic, tachycardic with increased WOB. CT scan on 8/21 showed right pneumomediastinum. Night of 8/24 patient was upgraded to BiPAP due to worsening hypoxia and WOB. He remained on BIPAP 100% FiO2 throughout 8/25 with increasing EPAP requirements, worsening SpO2 and worsening sinus tachycardia. At the start of my shift on 8/25 @ 19:00 patient was seen and examined at the bedside. He was on BiPAP 15/12 FiO2 100%, RR 35-45, and appeared to have increase WOB. When asked how he felt he gave a thumbs up. Throughout the night he was frequently reassessed by myself and nursing staff. Around 0145 he was noted to have increasing tachycardia to 160 sinus, which was significantly higher than previous (130s). STAT ABG and CXR were ordered in discussion with E-ICU. ABG showed severe hypoxia with pH 7.3, CXR with large right PTX about 50%, no tension physiology, BP at that time was 121/90. At this point intubation was necessary in order to control his TV and breathing rate. PTX most likely developed from the high TV he was pulling while on BiPAP in coordination with his poor lung compliance from his COVID 19 PNA and severe lung injury. E-ICU was contacted again who recommended intubation and chest tube placement. Decision was made to first intubate the patient prior to chest tube placement as his respiratory status was tenuous. He was unable to sit up, or sit still to facilitate chest tube placement safely. Giving sedation to the patient would have been risky without securing an airway. 51 y/o male with pmhx of HTN, HLD, DM II who was admitted on 8/12 with COVID-19 pneumonia with hospital course complicated by acute hypoxic respiratory failure and ARDS. He has received remdesivir, solumedrol, thiamine, zinc, folic acid. A few days ago patient was transferred to the ICU for worsening hypoxic respiratory failure placed on HFNC and attempted to prone. His respiratory status remained poor, he remained tachypneic, tachycardic with increased WOB. CT scan on 8/21 showed right pneumomediastinum. Night of 8/24 patient was upgraded to BiPAP due to worsening hypoxia and WOB. He remained on BIPAP 100% FiO2 throughout 8/25 with increasing EPAP requirements, worsening SpO2 and worsening sinus tachycardia. At the start of my shift on 8/25 @ 19:00 patient was seen and examined at the bedside. He was on BiPAP 15/12 FiO2 100%, RR 35-45, and appeared to have increase WOB. When asked how he felt he gave a thumbs up. Throughout the night he was frequently reassessed by myself and nursing staff. Around 0145 he was noted to have increasing tachycardia to 160 sinus, which was significantly higher than previous (130s). STAT ABG and CXR were ordered in discussion with E-ICU. ABG showed severe hypoxia with pH 7.3, CXR with large right PTX about 50%, no tension physiology, BP at that time was 121/90. At this point intubation was necessary in order to control his TV and breathing rate. PTX most likely developed from the high TV he was pulling while on BiPAP in coordination with his poor lung compliance from his COVID 19 PNA and severe lung injury. E-ICU was contacted again who recommended intubation and chest tube placement. Decision was made to first intubate the patient prior to chest tube placement as his respiratory status was tenuous. He was unable to sit up, or sit still to facilitate chest tube placement safely. Giving sedation to the patient would have been risky without securing an airway given his already severely poor respiratory status. Intubation was preformed by anesthesia using etomidate, propofol, and Succinylcholine. Airway was secured on first attempt, without encountering issues. Shortly after the intubation patient was noted to develop a wide complex rhythm, bradycardia, and then lost a pulse. CPR was started immediately, followed by epi, bicarb, and calcium. Chest tube was placed by myself at the bedside urgently within 1 minute of arrest, air was observed to leak out and once connected to the chest tube, air bubbles were noted. CPR was being preformed continuously, with pulse checks every 2min followed by epi pushes, bicarb. During two pulse checks he was noted to be in v-fib and was shocked twice with ROSC obtained after second shock, downtime was appox. 15-20min. After first V-fib arrythmia 300mg of amio was pushed, he also got another bicarb and calcium. After ROSC patient was start on levophed and epi gtts, central line and a line were placed emergently in leftfemoral area. CXR post arrest showed a properly placed ET tube, NGT, and Right sided chest tube with reinflation of the lung. Post arrest labs with severe metabolic and respiratory acidosis, lactate 18, severe transaminitis and SUNI. Bicarb amps x2 pushed, followed by bicarb gtt, pressors titrated to maintain MAP >65. propofol infusion started. POCUS post arrest showed LV function which appeared to be preserved, IVC roughly 2cm without resp variation. During code he did get 2L IVF. Amio gtt was also started post arrest @ 1mg/min. Vent settings increased to 34/400/100%/5. PaO2 90 on ABG with SpO2 90%. Attempted to call darek Russell at number [phone] however it went straight to voice mail twice and it was 1 hour between calls. Message left to please call [facility] back ASAP regarding her fathers condition.     Problem List:  1) acute hypoxic and hypercapnic resp failure  2) metabolic acidosis   3) lactic acidosis   4) shock state, most likely cardiac vs. septic  5) Right PTX  6) Transaminitis   7) SUNI  8) cardiac arrest 2/2 ? tension PTX, ? hyperkalemia      CRITICAL CARE TIME: 74 min with additional 30min assessing presenting problems of acute illness, which pose high probability of life threatening deterioration or end organ damage/dysfunction, as well as medical decision making including initiating plan of care, reviewing data, reviewing radiologic exams, discussing with multidisciplinary team,  discussing goals of care with patient/family, and writing this note.  Non-inclusive of procedures performed, 51 y/o male with pmhx of HTN, HLD, DM II who was admitted on 8/12 with COVID-19 pneumonia with hospital course complicated by acute hypoxic respiratory failure and ARDS. He has received remdesivir, solumedrol, thiamine, zinc, folic acid. A few days ago patient was transferred to the ICU for worsening hypoxic respiratory failure placed on HFNC and attempted to prone. His respiratory status remained poor, he remained tachypneic, tachycardic with increased WOB. CT scan on 8/21 showed right pneumomediastinum. Night of 8/24 patient was upgraded to BiPAP due to worsening hypoxia and WOB. He remained on BIPAP 100% FiO2 throughout 8/25 with increasing EPAP requirements, worsening SpO2 and worsening sinus tachycardia. At the start of my shift on 8/25 @ 19:00 patient was seen and examined at the bedside. He was on BiPAP 15/12 FiO2 100%, RR 35-45, and appeared to have increase WOB. When asked how he felt he gave a thumbs up. Throughout the night he was frequently reassessed by myself and nursing staff. Around 0145 he was noted to have increasing tachycardia to 160 sinus, which was significantly higher than previous (130s). STAT ABG and CXR were ordered in discussion with E-ICU. ABG showed severe hypoxia with pH 7.3, CXR with large right PTX about 50%, no tension physiology, BP at that time was 121/90. At this point intubation was necessary in order to control his TV and breathing rate. PTX most likely developed from the high TV he was pulling while on BiPAP in coordination with his poor lung compliance from his COVID 19 PNA and severe lung injury. E-ICU was contacted again who recommended intubation and chest tube placement. Decision was made to first intubate the patient prior to chest tube placement as his respiratory status was tenuous. He was unable to sit up, or sit still to facilitate chest tube placement safely. Giving sedation to the patient would have been risky without securing an airway given his already severely poor respiratory status. Intubation was preformed by anesthesia using etomidate, propofol, and Succinylcholine. Airway was secured on first attempt, without encountering issues. Shortly after the intubation patient was noted to develop a wide complex rhythm, bradycardia, and then lost a pulse. CPR was started immediately, followed by epi, bicarb, and calcium. Chest tube was placed by myself at the bedside urgently within 1 minute of arrest, air was observed to leak out and once connected to the chest tube, air bubbles were noted. CPR was being preformed continuously, with pulse checks every 2min followed by epi pushes, bicarb. During two pulse checks he was noted to be in v-fib and was shocked twice with ROSC obtained after second shock, downtime was appox. 15-20min. After first V-fib arrythmia 300mg of amio was pushed, he also got another bicarb and calcium. After ROSC patient was start on levophed and epi gtts, central line and a line were placed emergently in leftfemoral area. CXR post arrest showed a properly placed ET tube, NGT, and Right sided chest tube with reinflation of the lung. Post arrest labs with severe metabolic and respiratory acidosis, lactate 18, severe transaminitis and SUNI. Bicarb amps x2 pushed, followed by bicarb gtt, pressors titrated to maintain MAP >65. propofol infusion started. POCUS post arrest showed LV function which appeared to be preserved, IVC roughly 2cm without resp variation. During code he did get 2L IVF. Amio gtt was also started post arrest @ 1mg/min. Vent settings increased to 34/400/100%/5. PaO2 90 on ABG with SpO2 90%. Attempted to call daughter Yvonne at number [phone] however it went straight to voice mail twice and it was 1 hour between calls. Message left to please call [facility] back ASAP regarding her fathers condition.     Problem List:  1) acute hypoxic and hypercapnic resp failure  2) metabolic acidosis   3) lactic acidosis   4) shock state, most likely cardiac vs. septic  5) Right PTX  6) Transaminitis   7) SUNI  8) cardiac arrest 2/2 ? tension PTX, ? hyperkalemia      CRITICAL CARE TIME: 74 min with additional 30min assessing presenting problems of acute illness, which pose high probability of life threatening deterioration or end organ damage/dysfunction, as well as medical decision making including initiating plan of care, reviewing data, reviewing radiologic exams, discussing with multidisciplinary team,  discussing goals of care with patient/family, and writing this note.  Non-inclusive of procedures performed,     Spoke to Concetta at number [phone] who is patient's other daughter, she answered the phone and I explained to her what happened to her father overnight, and how he breathing became much worse, developed a PTX, went on the ventilator and then his heart stopped. I explained that we were able to restart his heart, however he is in critical condition and it is unknown if he will be able to get better. I offered my sincerest condolences and told her to please call back if they needed anything or had anymore questions. I also told them to please call back if they would like to come in to see him as we can make arrangements for that. She states that she will talk to her sister Yvonne and then call back if they needed anything. 49 y/o male with pmhx of HTN, HLD, DM II who was admitted on 8/12 with COVID-19 pneumonia with hospital course complicated by acute hypoxic respiratory failure and ARDS. He has received remdesivir, solumedrol, thiamine, zinc, folic acid. A few days ago patient was transferred to the ICU for worsening hypoxic respiratory failure placed on HFNC and attempted to prone. His respiratory status remained poor, he remained tachypneic, tachycardic with increased WOB. CT scan on 8/21 showed right pneumomediastinum. Night of 8/24 patient was upgraded to BiPAP due to worsening hypoxia and WOB. He remained on BIPAP 100% FiO2 throughout 8/25 with increasing EPAP requirements, worsening SpO2 and worsening sinus tachycardia. At the start of my shift on 8/25 @ 19:00 patient was seen and examined at the bedside. He was on BiPAP 15/12 FiO2 100%, RR 35-45, and appeared to have increase WOB. When asked how he felt he gave a thumbs up. Throughout the night he was frequently reassessed by myself and nursing staff. Around 0145 he was noted to have increasing tachycardia to 160 sinus, which was significantly higher than previous (130s). STAT ABG and CXR were ordered in discussion with E-ICU. ABG showed severe hypoxia with pH 7.3, CXR with large right PTX about 50%, no tension physiology, BP at that time was 121/90. At this point intubation was necessary in order to control his TV and breathing rate. PTX most likely developed from the high TV he was pulling while on BiPAP in coordination with his poor lung compliance from his COVID 19 PNA and severe lung injury. E-ICU was contacted again who recommended intubation and chest tube placement. Decision was made to first intubate the patient prior to chest tube placement as his respiratory status was tenuous. He was unable to sit up, or sit still to facilitate chest tube placement safely. Giving sedation to the patient would have been risky without securing an airway given his already severely poor respiratory status. Intubation was preformed by anesthesia using etomidate, propofol, and Succinylcholine. Airway was secured on first attempt, without encountering issues. Shortly after the intubation patient was noted to develop a wide complex rhythm, bradycardia, and then lost a pulse. CPR was started immediately, followed by epi, bicarb, and calcium. Chest tube was placed by myself at the bedside urgently within 1 minute of arrest, air was observed to leak out and once connected to the chest tube, air bubbles were noted. CPR was being preformed continuously, with pulse checks every 2min followed by epi pushes, bicarb. During two pulse checks he was noted to be in v-fib and was shocked twice with ROSC obtained after second shock, downtime was appox. 15-20min. After first V-fib arrythmia 300mg of amio was pushed, he also got another bicarb and calcium. After ROSC patient was start on levophed and epi gtts, central line and a line were placed emergently in leftfemoral area. CXR post arrest showed a properly placed ET tube, NGT, and Right sided chest tube with reinflation of the lung. Post arrest labs with severe metabolic and respiratory acidosis, lactate 18, severe transaminitis and SUNI. Bicarb amps x2 pushed, followed by bicarb gtt, pressors titrated to maintain MAP >65. propofol infusion started. POCUS post arrest showed LV function which appeared to be preserved, IVC roughly 2cm without resp variation. During code he did get 2L IVF. Amio gtt was also started post arrest @ 1mg/min. Vent settings increased to 34/400/100%/5. PaO2 90 on ABG with SpO2 90%. Attempted to call daughter Yvonne at number [phone] however it went straight to voice mail twice and it was 1 hour between calls. Message left to please call [facility] back ASAP regarding her fathers condition.     Problem List:  1) acute hypoxic and hypercapnic resp failure  2) metabolic acidosis   3) lactic acidosis   4) shock state, most likely cardiac vs. septic  5) Right PTX  6) Transaminitis   7) SUNI  8) cardiac arrest 2/2 ? tension PTX, ? hyperkalemia      CRITICAL CARE TIME: 74 min with additional 30min assessing presenting problems of acute illness, which pose high probability of life threatening deterioration or end organ damage/dysfunction, as well as medical decision making including initiating plan of care, reviewing data, reviewing radiologic exams, discussing with multidisciplinary team,  discussing goals of care with patient/family, and writing this note.  Non-inclusive of procedures performed,     Spoke to Concetta at number [phone] who is patient's other daughter, she answered the phone and I explained to her what happened to her father overnight, and how he breathing became much worse, developed a PTX, went on the ventilator and then his heart stopped. I explained that we were able to restart his heart, however he is in critical condition and it is unknown if he will be able to get better. I offered my sincerest condolences and told her to please call back if they needed anything or had anymore questions. I also told them to please call back if they would like to come in to see him as we can make arrangements for that. She states that she will talk to her sister Yvonne and then call back if they needed anything.     Spoke to daughter Yvonne shortly after I spoke to Concetta and explained the same thing as I did to Concetta. Yvonne is making plans to come in to see her father.     Repeat ABG with improvement in ventilation and oxygenation but worsening lactic acidosis, mild improve in pH. Bp is tenuous despite levophed, epi, and vaso. Would like to wean epi however if epi is weaned down BP drops to MAP below 60s, unable to titrate pressors down. will start ketamine and prcedex infusions and stop propofol infusion in an effort to improve BP. I also put a call out to nephrology about the need for CVVHD I asked for a STAT call back, I am waiting to hear back from them, call placed @ 06:25

## 2020-08-26 NOTE — PROGRESS NOTE ADULT - SUBJECTIVE AND OBJECTIVE BOX
Patient is a 50y old  Male who presents with a chief complaint of Acute Hypoxemic Respiratory Failure, COVID19 PNA (24 Aug 2020 01:19)    BRIEF HOSPITAL COURSE:   49 y/o male with pmhx of HTN, HLD, DM II who was admitted on 8/12 with COVID-19 pneumonia with hospital course complicated by acute hypoxic respiratory failure and ARDS. He has received remdesivir, solumedrol, thiamine, zinc, folic acid. His sats have been stable on HFNC 100% until today when he desatted to 82% and did not improve, prompting MICU consult. Placed patient on 100% NRB on top of HFNC with improvement in sats to 92%. His inflammatory markers are increasing. he is mildly tachypneic to 30 and tachycardic to 103 but appears comfortable and is speaking in full sentences. Transfer to [address] [place] [address] ICU.    Events last 24 hours:   Found to have R PTX overnight and pt coded for ~ 15-20min soon after intubation.   Currently on three pressors, bicarb and amiodarone gtt  Started on hypothermia protocol as well    PAST MEDICAL & SURGICAL HISTORY:  DM (diabetes mellitus)  Hypertriglyceridemia  Hypertension  No significant past surgical history    Review of Systems:  Unable to assess given metation     Physical Examination:    ICU Vital Signs Last 24 Hrs  T(C): 38.6 (26 Aug 2020 13:00), Max: 38.7 (26 Aug 2020 12:00)  T(F): 101.5 (26 Aug 2020 13:00), Max: 101.7 (26 Aug 2020 12:00)  HR: 117 (26 Aug 2020 13:00) (117 - 166)  BP: 131/65 (26 Aug 2020 09:00) (73/46 - 179/142)  BP(mean): 84 (26 Aug 2020 09:00) (51 - 151)  ABP: 121/55 (26 Aug 2020 13:00) (79/28 - 131/64)  ABP(mean): 70 (26 Aug 2020 13:00) (42 - 84)  RR: 36 (26 Aug 2020 13:00) (26 - 42)  SpO2: 100% (26 Aug 2020 13:00) (84% - 100%)      Neuro: Sedated and could not assess neuro status    HEENT: Pupils equal, sluggish     PULM: Decreased bilaterally air entry    CVS: Regular rhythm and controlled rate, no murmurs, rubs, or gallops    ABD: Soft, nondistended, nontender, normoactive bowel sounds    EXT: No b/l LE edema, nontender with pedal pulse palpable     SKIN: Warm and well perfused, no acute rashes     MEDICATIONS  (STANDING):  aMIOdarone Infusion 0.5 mG/Min (16.7 mL/Hr) IV Continuous <Continuous>  ascorbic acid 1500 milliGRAM(s) Oral daily  aspirin enteric coated 325 milliGRAM(s) Oral daily  chlorhexidine 0.12% Liquid 15 milliLiter(s) Oral Mucosa every 12 hours  chlorhexidine 2% Cloths 1 Application(s) Topical <User Schedule>  CRRT Treatment    <Continuous>  dexAMETHasone  IVPB 10 milliGRAM(s) IV Intermittent daily  dexMEDEtomidine Infusion 0.3 MICROgram(s)/kG/Hr (6.33 mL/Hr) IV Continuous <Continuous>  dextrose 5% 1000 milliLiter(s) (125 mL/Hr) IV Continuous <Continuous>  dextrose 5%. 1000 milliLiter(s) (50 mL/Hr) IV Continuous <Continuous>  dextrose 50% Injectable 12.5 Gram(s) IV Push once  dextrose 50% Injectable 25 Gram(s) IV Push once  dextrose 50% Injectable 25 Gram(s) IV Push once  EPINEPHrine    Infusion 0.4 MICROgram(s)/kG/Min (63.3 mL/Hr) IV Continuous <Continuous>  heparin   Injectable 5000 Unit(s) SubCutaneous every 8 hours  heparin  Infusion Syringe 300 Unit(s)/Hr (0.6 mL/Hr) CRRT <Continuous>  insulin glargine Injectable (LANTUS) 30 Unit(s) SubCutaneous at bedtime  insulin lispro (HumaLOG) corrective regimen sliding scale   SubCutaneous three times a day before meals  insulin lispro (HumaLOG) corrective regimen sliding scale   SubCutaneous at bedtime  ketamine Infusion. 0.25 mG/kG/Hr (2.11 mL/Hr) IV Continuous <Continuous>  lisinopril 5 milliGRAM(s) Oral daily  norepinephrine Infusion 0.05 MICROgram(s)/kG/Min (3.96 mL/Hr) IV Continuous <Continuous>  Phoxillum Filtration BK 4 / 2.5 5000 milliLiter(s) (1000 mL/Hr) CRRT <Continuous>  Phoxillum Filtration BK 4 / 2.5 5000 milliLiter(s) (2000 mL/Hr) CRRT <Continuous>  Phoxillum Filtration BK 4 / 2.5 5000 milliLiter(s) (1500 mL/Hr) CRRT <Continuous>  thiamine 100 milliGRAM(s) Oral daily  vasopressin Infusion 0.04 Unit(s)/Min (2.4 mL/Hr) IV Continuous <Continuous>  zinc sulfate 220 milliGRAM(s) Oral daily    MEDICATIONS  (PRN):  acetaminophen   Tablet .. 650 milliGRAM(s) Oral every 4 hours PRN Temp greater or equal to 38.5C (101.3F)  acetaminophen  Suppository .. 650 milliGRAM(s) Rectal every 4 hours PRN Temp greater or equal to 38C (100.4F)  ALBUTerol    90 MICROgram(s) HFA Inhaler 2 Puff(s) Inhalation every 4 hours PRN Shortness of Breath and/or Wheezing  dextrose 40% Gel 15 Gram(s) Oral once PRN Blood Glucose LESS THAN 70 milliGRAM(s)/deciliter  glucagon  Injectable 1 milliGRAM(s) IntraMuscular once PRN Glucose LESS THAN 70 milligrams/deciliter  morphine  - Injectable 2 milliGRAM(s) IV Push every 4 hours PRN distress/anxiety  ondansetron Injectable 4 milliGRAM(s) IV Push every 6 hours PRN Nausea and/or Vomiting    I&O's Summary    25 Aug 2020 07:01  -  26 Aug 2020 07:00  --------------------------------------------------------  IN: 2724.8 mL / OUT: 1560 mL / NET: 1164.8 mL    26 Aug 2020 07:01  -  26 Aug 2020 13:13  --------------------------------------------------------  IN: 2957.6 mL / OUT: 3 mL / NET: 2954.6 mL

## 2020-08-26 NOTE — CONSULT NOTE ADULT - REASON FOR ADMISSION
Acute Hypoxemic Respiratory Failure, COVID19 PNA

## 2020-08-26 NOTE — PROCEDURE NOTE - NSPROCDETAILS_GEN_ALL_CORE
sterile dressing applied/sterile technique, catheter placed/guidewire recovered/lumen(s) aspirated and flushed/ultrasound guidance
connected to a pressurized flush line/all materials/supplies accounted for at end of procedure/ultrasound guidance/positive blood return obtained via catheter/hemostasis with direct pressure, dressing applied/Seldinger technique/location identified, draped/prepped, sterile technique used, needle inserted/introduced/sutured in place
thoracostomy tube placed percutaneously/dressing applied/secured in place/sterile dressing applied/supine position/Seldinger technique/percutaneous

## 2020-08-26 NOTE — CONSULT NOTE ADULT - SUBJECTIVE AND OBJECTIVE BOX
HPI: 50M with PMH as listed admitted 8/12 with COVID pnuemonia, hypoxic respiratory failure requiring 100% high flow, s/p acute decompensation, right PTX, code blue for about 15-20 minutes, intubated and admitted to ICU. patient currently in shock on multiple pressors, with worsening inflammatory markers, shock liver, worsening kidney function, with grave prognosis.     PERTINENT PMH REVIEWED: Yes     PAST MEDICAL & SURGICAL HISTORY:  DM (diabetes mellitus)  Hypertriglyceridemia  Hypertension  No significant past surgical history    SOCIAL HISTORY:  unable to obtain patient intubated                                    Admitted from:  home      Surrogate - daughter Yvnone     FAMILY HISTORY:  No pertinent family history in first degree relatives    Baseline ADLs (prior to admission):  Independent     Allergies    No Known Allergies    Intolerances    Paper tray only (Unknown)    Present Symptoms:     Dyspnea: 0 1 2 3   Nausea/Vomiting: Yes No  Anxiety:  Yes No  Depression: Yes No  Fatigue: Yes No  Loss of appetite: Yes No    Pain:             Character-            Duration-            Effect-            Factors-            Frequency-            Location-            Severity-    Review of Systems: Reviewed                     Negative:                     Positive:  Unable to obtain due to poor mentation   All others negative    MEDICATIONS  (STANDING):  aMIOdarone Infusion 0.5 mG/Min (16.7 mL/Hr) IV Continuous <Continuous>  ascorbic acid 1500 milliGRAM(s) Oral daily  aspirin enteric coated 325 milliGRAM(s) Oral daily  chlorhexidine 0.12% Liquid 15 milliLiter(s) Oral Mucosa every 12 hours  chlorhexidine 2% Cloths 1 Application(s) Topical <User Schedule>  CRRT Treatment    <Continuous>  dexAMETHasone  IVPB 10 milliGRAM(s) IV Intermittent daily  dexMEDEtomidine Infusion 0.3 MICROgram(s)/kG/Hr (6.33 mL/Hr) IV Continuous <Continuous>  dextrose 5% 1000 milliLiter(s) (125 mL/Hr) IV Continuous <Continuous>  dextrose 5%. 1000 milliLiter(s) (50 mL/Hr) IV Continuous <Continuous>  dextrose 50% Injectable 12.5 Gram(s) IV Push once  dextrose 50% Injectable 25 Gram(s) IV Push once  dextrose 50% Injectable 25 Gram(s) IV Push once  EPINEPHrine    Infusion 0.4 MICROgram(s)/kG/Min (63.3 mL/Hr) IV Continuous <Continuous>  heparin   Injectable 5000 Unit(s) SubCutaneous every 8 hours  heparin  Infusion Syringe 300 Unit(s)/Hr (0.6 mL/Hr) CRRT <Continuous>  insulin glargine Injectable (LANTUS) 30 Unit(s) SubCutaneous at bedtime  insulin lispro (HumaLOG) corrective regimen sliding scale   SubCutaneous three times a day before meals  insulin lispro (HumaLOG) corrective regimen sliding scale   SubCutaneous at bedtime  insulin lispro Injectable (HumaLOG) 7 Unit(s) SubCutaneous every 6 hours  ketamine Infusion. 0.25 mG/kG/Hr (2.11 mL/Hr) IV Continuous <Continuous>  lisinopril 5 milliGRAM(s) Oral daily  norepinephrine Infusion 0.05 MICROgram(s)/kG/Min (3.96 mL/Hr) IV Continuous <Continuous>  Phoxillum Filtration BK 4 / 2.5 5000 milliLiter(s) (1000 mL/Hr) CRRT <Continuous>  Phoxillum Filtration BK 4 / 2.5 5000 milliLiter(s) (2000 mL/Hr) CRRT <Continuous>  Phoxillum Filtration BK 4 / 2.5 5000 milliLiter(s) (1500 mL/Hr) CRRT <Continuous>  thiamine 100 milliGRAM(s) Oral daily  vasopressin Infusion 0.04 Unit(s)/Min (2.4 mL/Hr) IV Continuous <Continuous>  zinc sulfate 220 milliGRAM(s) Oral daily    MEDICATIONS  (PRN):  acetaminophen   Tablet .. 650 milliGRAM(s) Oral every 4 hours PRN Temp greater or equal to 38.5C (101.3F)  acetaminophen  Suppository .. 650 milliGRAM(s) Rectal every 4 hours PRN Temp greater or equal to 38C (100.4F)  ALBUTerol    90 MICROgram(s) HFA Inhaler 2 Puff(s) Inhalation every 4 hours PRN Shortness of Breath and/or Wheezing  dextrose 40% Gel 15 Gram(s) Oral once PRN Blood Glucose LESS THAN 70 milliGRAM(s)/deciliter  glucagon  Injectable 1 milliGRAM(s) IntraMuscular once PRN Glucose LESS THAN 70 milligrams/deciliter  morphine  - Injectable 2 milliGRAM(s) IV Push every 4 hours PRN distress/anxiety  ondansetron Injectable 4 milliGRAM(s) IV Push every 6 hours PRN Nausea and/or Vomiting    PHYSICAL EXAM:    Vital Signs Last 24 Hrs  T(C): 35.9 (26 Aug 2020 14:00), Max: 38.7 (26 Aug 2020 12:00)  T(F): 96.6 (26 Aug 2020 14:00), Max: 101.7 (26 Aug 2020 12:00)  HR: 104 (26 Aug 2020 14:30) (104 - 166)  BP: 131/65 (26 Aug 2020 09:00) (73/46 - 179/142)  BP(mean): 84 (26 Aug 2020 09:00) (51 - 151)  RR: 36 (26 Aug 2020 14:30) (26 - 41)  SpO2: 100% (26 Aug 2020 14:30) (84% - 100%)    General: alert  oriented x ____ lethargic agitated                  cachexia  nonverbal  coma    Karnofsky:  %    HEENT: normal  dry mouth  ET tube/trach    Lungs: comfortable tachypnea/labored breathing  excessive secretions    CV: normal  tachycardia    GI: normal  distended  tender  no BS               PEG/NG/OG tube  constipation  last BM:     : normal  incontinent  oliguria/anuria  oden    MSK: normal  weakness  edema             ambulatory  bedbound/wheelchair bound    Skin: normal  pressure ulcers- Stage_____  no rash    LABS:                      12.6   26.40 )-----------( 111      ( 26 Aug 2020 12:42 )             42.3     08-26    155<H>  |  97<L>  |  48.0<H>  ----------------------------<  294<H>  5.1   |  12.0<L>  |  2.77<H>    Ca    7.2<L>      26 Aug 2020 12:42  Phos  15.6     08-26  Mg     3.7     08-26    TPro  4.2<L>  /  Alb  1.8<L>  /  TBili  2.4<H>  /  DBili  x   /  AST  1040<H>  /  ALT  >6450<H>  /  AlkPhos  194<H>  08-26    PT/INR - ( 26 Aug 2020 04:00 )   PT: 23.6 sec;   INR: 2.11 ratio       PTT - ( 26 Aug 2020 04:00 )  PTT:90.6 sec    I&O's Summary    25 Aug 2020 07:01  -  26 Aug 2020 07:00  --------------------------------------------------------  IN: 2724.8 mL / OUT: 1560 mL / NET: 1164.8 mL    26 Aug 2020 07:01  -  26 Aug 2020 15:05  --------------------------------------------------------  IN: 3736.4 mL / OUT: 3 mL / NET: 3733.4 mL        RADIOLOGY & ADDITIONAL STUDIES:    ADVANCE DIRECTIVES:   DNR YES NO  Completed on:                     TANK  YES NO   Completed on:  Living Will  YES NO   Completed on: HPI: 50M with PMH as listed admitted 8/12 with COVID pnuemonia, hypoxic respiratory failure requiring 100% high flow, s/p acute decompensation, right PTX, code blue for about 15-20 minutes, intubated and admitted to ICU. patient currently in shock on multiple pressors, with worsening inflammatory markers, shock liver, worsening kidney function, with grave prognosis.     PERTINENT PMH REVIEWED: Yes     PAST MEDICAL & SURGICAL HISTORY:  DM (diabetes mellitus)  Hypertriglyceridemia  Hypertension  No significant past surgical history    SOCIAL HISTORY:  unable to obtain patient intubated                                    Admitted from:  home      Surrogate - daughter Yvonne     FAMILY HISTORY:  No pertinent family history in first degree relatives    Baseline ADLs (prior to admission):  Independent     Allergies    No Known Allergies    Intolerances    Paper tray only (Unknown)    Present Symptoms:     Dyspnea: vented   Nausea/Vomiting: No  Anxiety: unable   Depression: unable   Fatigue: unable   Loss of appetite: unable     Pain: none             Character-            Duration-            Effect-            Factors-            Frequency-            Location-            Severity-    Review of Systems: Reviewed                    Unable to obtain due to poor mentation   All others negative    MEDICATIONS  (STANDING):  aMIOdarone Infusion 0.5 mG/Min (16.7 mL/Hr) IV Continuous <Continuous>  ascorbic acid 1500 milliGRAM(s) Oral daily  aspirin enteric coated 325 milliGRAM(s) Oral daily  chlorhexidine 0.12% Liquid 15 milliLiter(s) Oral Mucosa every 12 hours  chlorhexidine 2% Cloths 1 Application(s) Topical <User Schedule>  CRRT Treatment    <Continuous>  dexAMETHasone  IVPB 10 milliGRAM(s) IV Intermittent daily  dexMEDEtomidine Infusion 0.3 MICROgram(s)/kG/Hr (6.33 mL/Hr) IV Continuous <Continuous>  dextrose 5% 1000 milliLiter(s) (125 mL/Hr) IV Continuous <Continuous>  dextrose 5%. 1000 milliLiter(s) (50 mL/Hr) IV Continuous <Continuous>  dextrose 50% Injectable 12.5 Gram(s) IV Push once  dextrose 50% Injectable 25 Gram(s) IV Push once  dextrose 50% Injectable 25 Gram(s) IV Push once  EPINEPHrine    Infusion 0.4 MICROgram(s)/kG/Min (63.3 mL/Hr) IV Continuous <Continuous>  heparin   Injectable 5000 Unit(s) SubCutaneous every 8 hours  heparin  Infusion Syringe 300 Unit(s)/Hr (0.6 mL/Hr) CRRT <Continuous>  insulin glargine Injectable (LANTUS) 30 Unit(s) SubCutaneous at bedtime  insulin lispro (HumaLOG) corrective regimen sliding scale   SubCutaneous three times a day before meals  insulin lispro (HumaLOG) corrective regimen sliding scale   SubCutaneous at bedtime  insulin lispro Injectable (HumaLOG) 7 Unit(s) SubCutaneous every 6 hours  ketamine Infusion. 0.25 mG/kG/Hr (2.11 mL/Hr) IV Continuous <Continuous>  lisinopril 5 milliGRAM(s) Oral daily  norepinephrine Infusion 0.05 MICROgram(s)/kG/Min (3.96 mL/Hr) IV Continuous <Continuous>  Phoxillum Filtration BK 4 / 2.5 5000 milliLiter(s) (1000 mL/Hr) CRRT <Continuous>  Phoxillum Filtration BK 4 / 2.5 5000 milliLiter(s) (2000 mL/Hr) CRRT <Continuous>  Phoxillum Filtration BK 4 / 2.5 5000 milliLiter(s) (1500 mL/Hr) CRRT <Continuous>  thiamine 100 milliGRAM(s) Oral daily  vasopressin Infusion 0.04 Unit(s)/Min (2.4 mL/Hr) IV Continuous <Continuous>  zinc sulfate 220 milliGRAM(s) Oral daily    MEDICATIONS  (PRN):  acetaminophen   Tablet .. 650 milliGRAM(s) Oral every 4 hours PRN Temp greater or equal to 38.5C (101.3F)  acetaminophen  Suppository .. 650 milliGRAM(s) Rectal every 4 hours PRN Temp greater or equal to 38C (100.4F)  ALBUTerol    90 MICROgram(s) HFA Inhaler 2 Puff(s) Inhalation every 4 hours PRN Shortness of Breath and/or Wheezing  dextrose 40% Gel 15 Gram(s) Oral once PRN Blood Glucose LESS THAN 70 milliGRAM(s)/deciliter  glucagon  Injectable 1 milliGRAM(s) IntraMuscular once PRN Glucose LESS THAN 70 milligrams/deciliter  morphine  - Injectable 2 milliGRAM(s) IV Push every 4 hours PRN distress/anxiety  ondansetron Injectable 4 milliGRAM(s) IV Push every 6 hours PRN Nausea and/or Vomiting    PHYSICAL EXAM:    Vital Signs Last 24 Hrs  T(C): 35.9 (26 Aug 2020 14:00), Max: 38.7 (26 Aug 2020 12:00)  T(F): 96.6 (26 Aug 2020 14:00), Max: 101.7 (26 Aug 2020 12:00)  HR: 104 (26 Aug 2020 14:30) (104 - 166)  BP: 131/65 (26 Aug 2020 09:00) (73/46 - 179/142)  BP(mean): 84 (26 Aug 2020 09:00) (51 - 151)  RR: 36 (26 Aug 2020 14:30) (26 - 41)  SpO2: 100% (26 Aug 2020 14:30) (84% - 100%)    General: vented     Karnofsky:  20 %    HEENT: ET tube     Lungs: comfortable    CV: normal      GI: normal     : oden    MSK: weakness      Skin:  no rash    LABS:                      12.6   26.40 )-----------( 111      ( 26 Aug 2020 12:42 )             42.3     08-26    155<H>  |  97<L>  |  48.0<H>  ----------------------------<  294<H>  5.1   |  12.0<L>  |  2.77<H>    Ca    7.2<L>      26 Aug 2020 12:42  Phos  15.6     08-26  Mg     3.7     08-26    TPro  4.2<L>  /  Alb  1.8<L>  /  TBili  2.4<H>  /  DBili  x   /  AST  1040<H>  /  ALT  >6450<H>  /  AlkPhos  194<H>  08-26    PT/INR - ( 26 Aug 2020 04:00 )   PT: 23.6 sec;   INR: 2.11 ratio       PTT - ( 26 Aug 2020 04:00 )  PTT:90.6 sec    I&O's Summary    25 Aug 2020 07:01  -  26 Aug 2020 07:00  --------------------------------------------------------  IN: 2724.8 mL / OUT: 1560 mL / NET: 1164.8 mL    26 Aug 2020 07:01  -  26 Aug 2020 15:05  --------------------------------------------------------  IN: 3736.4 mL / OUT: 3 mL / NET: 3733.4 mL    RADIOLOGY & ADDITIONAL STUDIES:    ADVANCE DIRECTIVES: Full code

## 2020-08-26 NOTE — PROGRESS NOTE ADULT - SUBJECTIVE AND OBJECTIVE BOX
[facility]  INFECTIOUS DISEASES AND INTERNAL MEDICINE at [place]  =======================================================  Quinn Rivera MD  Diplomates American Board of Internal Medicine and Infectious Diseases  Tel: [phone]      Fax: [phone]  =======================================================    HERNANDEZSOPHIA HERRERAO [id number]    Follow up: covid 19  found to have rt pneumothorax, s/p cardiac arrest now intubated on pressors with MOF    Allergies:  No Known Allergies  Paper tray only (Unknown)      Medications:  acetaminophen   Tablet .. 650 milliGRAM(s) Oral every 4 hours PRN  acetaminophen  Suppository .. 650 milliGRAM(s) Rectal every 4 hours PRN  ALBUTerol    90 MICROgram(s) HFA Inhaler 2 Puff(s) Inhalation every 4 hours PRN  ascorbic acid 1500 milliGRAM(s) Oral daily  aspirin enteric coated 325 milliGRAM(s) Oral daily  chlorhexidine 0.12% Liquid 15 milliLiter(s) Oral Mucosa every 12 hours  chlorhexidine 2% Cloths 1 Application(s) Topical <User Schedule>  CRRT Treatment    <Continuous>  dexAMETHasone  IVPB 10 milliGRAM(s) IV Intermittent daily  dexMEDEtomidine Infusion 0.3 MICROgram(s)/kG/Hr IV Continuous <Continuous>  dextrose 40% Gel 15 Gram(s) Oral once PRN  dextrose 5% 1000 milliLiter(s) IV Continuous <Continuous>  dextrose 50% Injectable 12.5 Gram(s) IV Push once  dextrose 50% Injectable 25 Gram(s) IV Push once  EPINEPHrine    Infusion 0.4 MICROgram(s)/kG/Min IV Continuous <Continuous>  heparin   Injectable 5000 Unit(s) SubCutaneous every 8 hours  heparin  Infusion Syringe 300 Unit(s)/Hr CRRT <Continuous>  insulin glargine Injectable (LANTUS) 30 Unit(s) SubCutaneous at bedtime  insulin lispro (HumaLOG) corrective regimen sliding scale   SubCutaneous three times a day before meals  insulin lispro (HumaLOG) corrective regimen sliding scale   SubCutaneous at bedtime  insulin lispro Injectable (HumaLOG) 7 Unit(s) SubCutaneous every 6 hours  midazolam Infusion 0.059 mG/kG/Hr IV Continuous <Continuous>  norepinephrine Infusion 0.05 MICROgram(s)/kG/Min IV Continuous <Continuous>  ondansetron Injectable 4 milliGRAM(s) IV Push every 6 hours PRN  Phoxillum Filtration BK 4 / 2.5 5000 milliLiter(s) CRRT <Continuous>  Phoxillum Filtration BK 4 / 2.5 5000 milliLiter(s) CRRT <Continuous>  Phoxillum Filtration BK 4 / 2.5 5000 milliLiter(s) CRRT <Continuous>  thiamine 100 milliGRAM(s) Oral daily  vasopressin Infusion 0.04 Unit(s)/Min IV Continuous <Continuous>  zinc sulfate 220 milliGRAM(s) Oral daily            REVIEW OF SYSTEMS:  CONSTITUTIONAL:  No Fever or chills  HEENT:   No diplopia or blurred vision.  No earache, sore throat or runny nose.  CARDIOVASCULAR:  No pressure, squeezing, strangling, tightness, heaviness or aching about the chest, neck, axilla or epigastrium.  RESPIRATORY:  No cough, shortness of breath  GASTROINTESTINAL:  No nausea, vomiting or diarrhea.  GENITOURINARY:  No dysuria, frequency or urgency. No Blood in urine  MUSCULOSKELETAL:  no joint aches, no muscle pain  SKIN:  No change in skin, hair or nails.  NEUROLOGIC:  No Headaches, seizures or weakness.  PSYCHIATRIC:  No disorder of thought or mood.  ENDOCRINE:  No heat or cold intolerance  HEMATOLOGICAL:  No easy bruising or bleeding.            Physical Exam:  ICU Vital Signs Last 24 Hrs  T(C): 33.3 (26 Aug 2020 19:00), Max: 38.7 (26 Aug 2020 12:00)  T(F): 91.9 (26 Aug 2020 19:00), Max: 101.7 (26 Aug 2020 12:00)  HR: 81 (26 Aug 2020 19:15) (78 - 166)  BP: 75/59 (26 Aug 2020 17:00) (73/46 - 179/142)  BP(mean): 63 (26 Aug 2020 17:00) (51 - 151)  ABP: 87/50 (26 Aug 2020 19:15) (79/28 - 131/64)  ABP(mean): 60 (26 Aug 2020 19:15) (42 - 84)  RR: 34 (26 Aug 2020 19:15) (26 - 41)  SpO2: 94% (26 Aug 2020 19:15) (84% - 100%)      GEN: NAD, pleasant  HEENT: normocephalic and atraumatic. EOMI. PERRL.  Anicteric   NECK: Supple.   LUNGS: Clear to auscultation.  HEART: Regular rate and rhythm without murmur.  ABDOMEN: Soft, nontender, and nondistended.  Positive bowel sounds.    : No CVA tenderness  EXTREMITIES: Without any edema.  MSK: no joint swelling  NEUROLOGIC: Cranial nerves II through XII are grossly intact. No focal deficits  PSYCHIATRIC: Appropriate affect .  SKIN: No Rash      Labs:                        12.6   26.40 )-----------( 111      ( 26 Aug 2020 12:42 )             42.3   08-26    155<H>  |  97<L>  |  48.0<H>  ----------------------------<  294<H>  5.1   |  12.0<L>  |  2.77<H>    Ca    7.2<L>      26 Aug 2020 12:42  Phos  15.6     08-26  Mg     3.7     08-26    TPro  4.2<L>  /  Alb  1.8<L>  /  TBili  2.4<H>  /  DBili  x   /  AST  1040<H>  /  ALT  >6450<H>  /  AlkPhos  194<H>  08-26    < from: Xray Chest 1 View-PORTABLE IMMEDIATE (08.26.20 @ 15:05) >  IMPRESSION: Pneumomediastinum.  Diffuse airspace disease.  Catheters in place..      < end of copied text >

## 2020-08-27 NOTE — PROGRESS NOTE ADULT - ASSESSMENT
1) ATN requiring RRT  2) Covid -19 PNA/ ARDs  3) Cardiac arrest s/p ROSC  4) Severe metabolic acidosis/ lactic acidosis  5) Multiorgan failure    Worsening clinical status  Poor prognosis  Will stop CRRT    Discussed with ICU team

## 2020-08-27 NOTE — PROGRESS NOTE ADULT - PROVIDER SPECIALTY LIST ADULT
Anesthesia
Critical Care
Hospitalist
Infectious Disease
Nephrology
Palliative Care
Pulmonology
Critical Care
Infectious Disease

## 2020-08-27 NOTE — PROGRESS NOTE ADULT - SUBJECTIVE AND OBJECTIVE BOX
[facility] DIVISION OF KIDNEY DISEASES AND HYPERTENSION -- HEMODIALYSIS NOTE  --------------------------------------------------------------------------------  Chief Complaint: [place]/ Ongoing RRT requirement    24 hour events/subjective:        PAST HISTORY  --------------------------------------------------------------------------------  No significant changes to PMH, PSH, FHx, SHx, unless otherwise noted    ALLERGIES & MEDICATIONS  --------------------------------------------------------------------------------  Allergies    No Known Allergies    Intolerances    Paper tray only (Unknown)    Standing Inpatient Medications  ascorbic acid 1500 milliGRAM(s) Oral daily  aspirin enteric coated 325 milliGRAM(s) Oral daily  chlorhexidine 0.12% Liquid 15 milliLiter(s) Oral Mucosa every 12 hours  chlorhexidine 2% Cloths 1 Application(s) Topical <User Schedule>  CRRT Treatment    <Continuous>  dexAMETHasone  IVPB 10 milliGRAM(s) IV Intermittent daily  dexMEDEtomidine Infusion 0.3 MICROgram(s)/kG/Hr IV Continuous <Continuous>  dextrose 5% 1000 milliLiter(s) IV Continuous <Continuous>  dextrose 50% Injectable 12.5 Gram(s) IV Push once  dextrose 50% Injectable 25 Gram(s) IV Push once  EPINEPHrine    Infusion 0.4 MICROgram(s)/kG/Min IV Continuous <Continuous>  heparin   Injectable 5000 Unit(s) SubCutaneous every 8 hours  heparin  Infusion Syringe 300 Unit(s)/Hr CRRT <Continuous>  insulin glargine Injectable (LANTUS) 30 Unit(s) SubCutaneous at bedtime  insulin lispro (HumaLOG) corrective regimen sliding scale   SubCutaneous three times a day before meals  insulin lispro (HumaLOG) corrective regimen sliding scale   SubCutaneous at bedtime  insulin lispro Injectable (HumaLOG) 7 Unit(s) SubCutaneous every 6 hours  midazolam Infusion 0.059 mG/kG/Hr IV Continuous <Continuous>  norepinephrine Infusion 0.05 MICROgram(s)/kG/Min IV Continuous <Continuous>  Phoxillum Filtration BK 4 / 2.5 5000 milliLiter(s) CRRT <Continuous>  Phoxillum Filtration BK 4 / 2.5 5000 milliLiter(s) CRRT <Continuous>  Phoxillum Filtration BK 4 / 2.5 5000 milliLiter(s) CRRT <Continuous>  piperacillin/tazobactam IVPB.. 3.375 Gram(s) IV Intermittent every 12 hours  thiamine 100 milliGRAM(s) Oral daily  vasopressin Infusion 0.04 Unit(s)/Min IV Continuous <Continuous>  zinc sulfate 220 milliGRAM(s) Oral daily    PRN Inpatient Medications  acetaminophen   Tablet .. 650 milliGRAM(s) Oral every 4 hours PRN  acetaminophen  Suppository .. 650 milliGRAM(s) Rectal every 4 hours PRN  ALBUTerol    90 MICROgram(s) HFA Inhaler 2 Puff(s) Inhalation every 4 hours PRN  dextrose 40% Gel 15 Gram(s) Oral once PRN  ondansetron Injectable 4 milliGRAM(s) IV Push every 6 hours PRN      REVIEW OF SYSTEMS  --------------------------------------------------------------------------------  unable to obtain  VITALS/PHYSICAL EXAM  --------------------------------------------------------------------------------  T(C): 31.9 (08-27-20 @ 10:00), Max: 38.7 (08-26-20 @ 12:00)  HR: 75 (08-27-20 @ 10:00) (64 - 128)  BP: 75/59 (08-26-20 @ 17:00) (75/59 - 75/59)  RR: 35 (08-27-20 @ 10:00) (34 - 41)  SpO2: 66% (08-27-20 @ 10:00) (66% - 100%)  Wt(kg): --    Weight (kg): 85 (08-26-20 @ 08:50)      08-26-20 @ 07:01  -  08-27-20 @ 07:00  --------------------------------------------------------  IN: 67123.7 mL / OUT: 262 mL / NET: 11693.7 mL    08-27-20 @ 07:01  -  08-27-20 @ 10:09  --------------------------------------------------------  IN: 1390.4 mL / OUT: 97 mL / NET: 1293.4 mL      Physical Exam:  	Gen: NAD, well-appearing  	HEENT: PERRL, supple neck,  	Pulm: CTA B/L  	CV: RRR, S1S2; no rub  	Abd: +BS, soft, nontender  	UE: Warm, intact strength; no asterixis  	LE: Warm, + edema  	Neuro: No focal deficits  	Psych: Normal affect and mood  	Skin: Warm, without rashes  	Vascular access:    LABS/STUDIES  --------------------------------------------------------------------------------              10.8   6.07  >-----------<  36       [08-27-20 @ 09:00]              36.1     142  |  118  |  10.0  ----------------------------<  73      [08-27-20 @ 09:00]  3.8   |  14.0  |  0.38        Ca     4.2     [08-27-20 @ 09:00]      Mg     1.5     [08-27-20 @ 09:00]      Phos  4.4     [08-27-20 @ 09:00]    TPro  2.2  /  Alb  <1.0  /  TBili  2.8  /  DBili  x   /  AST  >7000  /  ALT  >6450  /  AlkPhos  223  [08-27-20 @ 09:00]    PT/INR: PT 23.6 , INR 2.11       [08-26-20 @ 04:00]  PTT: 80.8       [08-27-20 @ 09:00]    Troponin 0.45      [08-27-20 @ 04:42]  LDH >2332      [08-27-20 @ 04:42]    Ferritin >[id number]      [08-27-20 @ 04:42]  Lipid: chol --, , HDL --, LDL --      [08-15-20 @ 11:24] [facility] DIVISION OF KIDNEY DISEASES AND HYPERTENSION -- HEMODIALYSIS NOTE  --------------------------------------------------------------------------------  Chief Complaint: [place]/ Ongoing RRT requirement    24 hour events/subjective:  remains on high dose pressors; full vent support  on CRRT        PAST HISTORY  --------------------------------------------------------------------------------  No significant changes to PMH, PSH, FHx, SHx, unless otherwise noted    ALLERGIES & MEDICATIONS  --------------------------------------------------------------------------------  Allergies    No Known Allergies    Intolerances    Paper tray only (Unknown)    Standing Inpatient Medications  ascorbic acid 1500 milliGRAM(s) Oral daily  aspirin enteric coated 325 milliGRAM(s) Oral daily  chlorhexidine 0.12% Liquid 15 milliLiter(s) Oral Mucosa every 12 hours  chlorhexidine 2% Cloths 1 Application(s) Topical <User Schedule>  CRRT Treatment    <Continuous>  dexAMETHasone  IVPB 10 milliGRAM(s) IV Intermittent daily  dexMEDEtomidine Infusion 0.3 MICROgram(s)/kG/Hr IV Continuous <Continuous>  dextrose 5% 1000 milliLiter(s) IV Continuous <Continuous>  dextrose 50% Injectable 12.5 Gram(s) IV Push once  dextrose 50% Injectable 25 Gram(s) IV Push once  EPINEPHrine    Infusion 0.4 MICROgram(s)/kG/Min IV Continuous <Continuous>  heparin   Injectable 5000 Unit(s) SubCutaneous every 8 hours  heparin  Infusion Syringe 300 Unit(s)/Hr CRRT <Continuous>  insulin glargine Injectable (LANTUS) 30 Unit(s) SubCutaneous at bedtime  insulin lispro (HumaLOG) corrective regimen sliding scale   SubCutaneous three times a day before meals  insulin lispro (HumaLOG) corrective regimen sliding scale   SubCutaneous at bedtime  insulin lispro Injectable (HumaLOG) 7 Unit(s) SubCutaneous every 6 hours  midazolam Infusion 0.059 mG/kG/Hr IV Continuous <Continuous>  norepinephrine Infusion 0.05 MICROgram(s)/kG/Min IV Continuous <Continuous>  Phoxillum Filtration BK 4 / 2.5 5000 milliLiter(s) CRRT <Continuous>  Phoxillum Filtration BK 4 / 2.5 5000 milliLiter(s) CRRT <Continuous>  Phoxillum Filtration BK 4 / 2.5 5000 milliLiter(s) CRRT <Continuous>  piperacillin/tazobactam IVPB.. 3.375 Gram(s) IV Intermittent every 12 hours  thiamine 100 milliGRAM(s) Oral daily  vasopressin Infusion 0.04 Unit(s)/Min IV Continuous <Continuous>  zinc sulfate 220 milliGRAM(s) Oral daily    PRN Inpatient Medications  acetaminophen   Tablet .. 650 milliGRAM(s) Oral every 4 hours PRN  acetaminophen  Suppository .. 650 milliGRAM(s) Rectal every 4 hours PRN  ALBUTerol    90 MICROgram(s) HFA Inhaler 2 Puff(s) Inhalation every 4 hours PRN  dextrose 40% Gel 15 Gram(s) Oral once PRN  ondansetron Injectable 4 milliGRAM(s) IV Push every 6 hours PRN      REVIEW OF SYSTEMS  --------------------------------------------------------------------------------  unable to obtain  VITALS/PHYSICAL EXAM  --------------------------------------------------------------------------------  T(C): 31.9 (08-27-20 @ 10:00), Max: 38.7 (08-26-20 @ 12:00)  HR: 75 (08-27-20 @ 10:00) (64 - 128)  BP: 75/59 (08-26-20 @ 17:00) (75/59 - 75/59)  RR: 35 (08-27-20 @ 10:00) (34 - 41)  SpO2: 66% (08-27-20 @ 10:00) (66% - 100%)  Wt(kg): --    Weight (kg): 85 (08-26-20 @ 08:50)      08-26-20 @ 07:01  -  08-27-20 @ 07:00  --------------------------------------------------------  IN: 32630.7 mL / OUT: 262 mL / NET: 95468.7 mL    08-27-20 @ 07:01  -  08-27-20 @ 10:09  --------------------------------------------------------  IN: 1390.4 mL / OUT: 97 mL / NET: 1293.4 mL      Physical Exam:  	Due to the nature of the pt's isolation status, no bedside physical exam done to limit spread of infection. Direct communication ordered with team.  Objective data was reviewed in detail.    LABS/STUDIES  --------------------------------------------------------------------------------              10.8   6.07  >-----------<  36       [08-27-20 @ 09:00]              36.1     142  |  118  |  10.0  ----------------------------<  73      [08-27-20 @ 09:00]  3.8   |  14.0  |  0.38        Ca     4.2     [08-27-20 @ 09:00]      Mg     1.5     [08-27-20 @ 09:00]      Phos  4.4     [08-27-20 @ 09:00]    TPro  2.2  /  Alb  <1.0  /  TBili  2.8  /  DBili  x   /  AST  >7000  /  ALT  >6450  /  AlkPhos  223  [08-27-20 @ 09:00]    PT/INR: PT 23.6 , INR 2.11       [08-26-20 @ 04:00]  PTT: 80.8       [08-27-20 @ 09:00]    Troponin 0.45      [08-27-20 @ 04:42]  LDH >2332      [08-27-20 @ 04:42]    Ferritin >[id number]      [08-27-20 @ 04:42]  Lipid: chol --, , HDL --, LDL --      [08-15-20 @ 11:24]

## 2020-08-27 NOTE — PROGRESS NOTE ADULT - ASSESSMENT
50M with COVID pnuemonia, s/p cardiac arrest intubation, transferred to ICU, with multiple organ failure, worsening inflammatory markers, lactic acidosis, vent dependent respiratory failure with grave prognosis.     #1 Ventilator dependence - COVID pnuemonia. poor prognosis  #2 Kidney failure - getting HD now  #3 cardiac arrest - poor prognosis, prolonged.   #4 Shock - multiple pressors. poor prognosis  #5 Encounter for palliative care - grave prognosis. at end of life. further aggressive measures will not be therapeutic. If he were to suffer cardiac arrest again, given maximal pressors and shock, CPR would not be therapeutic.

## 2020-08-27 NOTE — PROGRESS NOTE ADULT - SUBJECTIVE AND OBJECTIVE BOX
Patient is a 50y old  Male who presents with a chief complaint of Acute Hypoxemic Respiratory Failure, COVID19 PNA (24 Aug 2020 01:19)    BRIEF HOSPITAL COURSE:   51 y/o male with pmhx of HTN, HLD, DM II who was admitted on 8/12 with COVID-19 pneumonia with hospital course complicated by acute hypoxic respiratory failure and ARDS. He has received remdesivir, solumedrol, thiamine, zinc, folic acid. His sats have been stable on HFNC 100% until today when he desatted to 82% and did not improve, prompting MICU consult. Placed patient on 100% NRB on top of HFNC with improvement in sats to 92%. His inflammatory markers are increasing. he is mildly tachypneic to 30 and tachycardic to 103 but appears comfortable and is speaking in full sentences. Transfer to [address] [place] [address] ICU.    Events last 24 hours:   Pt has remained on very high dose pressors, Levo @ 2, Epi @1 and fix dose Vasopressin, in addition to amiodarone, bicarb gtt. Also on full vent support AC/%, PEEP 8 and w/ high peaks. CVVHD was started yesterday afternoon as well    PAST MEDICAL & SURGICAL HISTORY:  DM (diabetes mellitus)  Hypertriglyceridemia  Hypertension  No significant past surgical history    Review of Systems:  Unable to assess given clinical status    Physical Examination:  ICU Vital Signs Last 24 Hrs  T(C): 31.9 (27 Aug 2020 12:00), Max: 38.6 (26 Aug 2020 12:45)  T(F): 89.5 (27 Aug 2020 12:00), Max: 101.5 (26 Aug 2020 12:45)  HR: 78 (27 Aug 2020 12:00) (64 - 120)  BP: 100/47 (27 Aug 2020 11:30) (75/59 - 100/47)  BP(mean): 59 (27 Aug 2020 11:30) (59 - 63)  ABP: 100/47 (27 Aug 2020 12:00) (82/39 - 130/63)  ABP(mean): 57 (27 Aug 2020 12:00) (48 - 84)  RR: 34 (27 Aug 2020 12:00) (34 - 41)  SpO2: 63% (27 Aug 2020 12:00) (63% - 100%)      Neuro: Sedated and could not assess neuro status    HEENT: Pupils equal, sluggish     PULM: Decreased bilaterally air entry    CVS: Regular rhythm and controlled rate, no murmurs, rubs, or gallops    ABD: Soft, nondistended, nontender, decreased bowel sounds    EXT: No b/l LE edema, nontender with pedal pulse palpable     SKIN: Warm and well perfused, no acute rashes       26 Aug 2020 07:01  -  26 Aug 2020 13:13  --------------------------------------------------------  IN: 2957.6 mL / OUT: 3 mL / NET: 2954.6 mL    MEDICATIONS  (STANDING):  ascorbic acid 1500 milliGRAM(s) Oral daily  aspirin enteric coated 325 milliGRAM(s) Oral daily  chlorhexidine 0.12% Liquid 15 milliLiter(s) Oral Mucosa every 12 hours  chlorhexidine 2% Cloths 1 Application(s) Topical <User Schedule>  CRRT Treatment    <Continuous>  dexAMETHasone  IVPB 10 milliGRAM(s) IV Intermittent daily  dexMEDEtomidine Infusion 0.3 MICROgram(s)/kG/Hr (6.33 mL/Hr) IV Continuous <Continuous>  dextrose 5% 1000 milliLiter(s) (125 mL/Hr) IV Continuous <Continuous>  dextrose 50% Injectable 12.5 Gram(s) IV Push once  dextrose 50% Injectable 25 Gram(s) IV Push once  dextrose 50% Injectable 12.5 Gram(s) IV Push once  EPINEPHrine    Infusion 0.4 MICROgram(s)/kG/Min (63.3 mL/Hr) IV Continuous <Continuous>  heparin   Injectable 5000 Unit(s) SubCutaneous every 8 hours  heparin  Infusion Syringe 300 Unit(s)/Hr (0.6 mL/Hr) CRRT <Continuous>  insulin lispro (HumaLOG) corrective regimen sliding scale   SubCutaneous three times a day before meals  insulin lispro (HumaLOG) corrective regimen sliding scale   SubCutaneous at bedtime  midazolam Infusion 0.059 mG/kG/Hr (5 mL/Hr) IV Continuous <Continuous>  norepinephrine Infusion 0.05 MICROgram(s)/kG/Min (3.96 mL/Hr) IV Continuous <Continuous>  Phoxillum Filtration BK 4 / 2.5 5000 milliLiter(s) (1000 mL/Hr) CRRT <Continuous>  Phoxillum Filtration BK 4 / 2.5 5000 milliLiter(s) (2000 mL/Hr) CRRT <Continuous>  Phoxillum Filtration BK 4 / 2.5 5000 milliLiter(s) (1500 mL/Hr) CRRT <Continuous>  piperacillin/tazobactam IVPB.. 3.375 Gram(s) IV Intermittent every 12 hours  thiamine 100 milliGRAM(s) Oral daily  vasopressin Infusion 0.04 Unit(s)/Min (2.4 mL/Hr) IV Continuous <Continuous>  zinc sulfate 220 milliGRAM(s) Oral daily    MEDICATIONS  (PRN):  acetaminophen   Tablet .. 650 milliGRAM(s) Oral every 4 hours PRN Temp greater or equal to 38.5C (101.3F)  acetaminophen  Suppository .. 650 milliGRAM(s) Rectal every 4 hours PRN Temp greater or equal to 38C (100.4F)  ALBUTerol    90 MICROgram(s) HFA Inhaler 2 Puff(s) Inhalation every 4 hours PRN Shortness of Breath and/or Wheezing  dextrose 40% Gel 15 Gram(s) Oral once PRN Blood Glucose LESS THAN 70 milliGRAM(s)/deciliter  ondansetron Injectable 4 milliGRAM(s) IV Push every 6 hours PRN Nausea and/or Vomiting

## 2020-08-27 NOTE — PROGRESS NOTE ADULT - SUBJECTIVE AND OBJECTIVE BOX
late entry.    OVERNIGHT EVENTS: doing poorly. at end of life.     Present Symptoms: see primary team notes. deferred due to COVID     MEDICATIONS  (STANDING):  ascorbic acid 1500 milliGRAM(s) Oral daily  chlorhexidine 0.12% Liquid 15 milliLiter(s) Oral Mucosa every 12 hours  chlorhexidine 2% Cloths 1 Application(s) Topical <User Schedule>  dexAMETHasone  IVPB 10 milliGRAM(s) IV Intermittent daily  dexMEDEtomidine Infusion 0.3 MICROgram(s)/kG/Hr (6.33 mL/Hr) IV Continuous <Continuous>  dextrose 5% 1000 milliLiter(s) (125 mL/Hr) IV Continuous <Continuous>  dextrose 50% Injectable 12.5 Gram(s) IV Push once  dextrose 50% Injectable 25 Gram(s) IV Push once  EPINEPHrine    Infusion 0.4 MICROgram(s)/kG/Min (63.3 mL/Hr) IV Continuous <Continuous>  insulin lispro (HumaLOG) corrective regimen sliding scale   SubCutaneous three times a day before meals  insulin lispro (HumaLOG) corrective regimen sliding scale   SubCutaneous at bedtime  midazolam Infusion 0.059 mG/kG/Hr (5 mL/Hr) IV Continuous <Continuous>  norepinephrine Infusion 0.05 MICROgram(s)/kG/Min (3.96 mL/Hr) IV Continuous <Continuous>  piperacillin/tazobactam IVPB.. 3.375 Gram(s) IV Intermittent every 12 hours  thiamine 100 milliGRAM(s) Oral daily  vasopressin Infusion 0.04 Unit(s)/Min (2.4 mL/Hr) IV Continuous <Continuous>  zinc sulfate 220 milliGRAM(s) Oral daily    MEDICATIONS  (PRN):  acetaminophen   Tablet .. 650 milliGRAM(s) Oral every 4 hours PRN Temp greater or equal to 38.5C (101.3F)  acetaminophen  Suppository .. 650 milliGRAM(s) Rectal every 4 hours PRN Temp greater or equal to 38C (100.4F)  ALBUTerol    90 MICROgram(s) HFA Inhaler 2 Puff(s) Inhalation every 4 hours PRN Shortness of Breath and/or Wheezing  dextrose 40% Gel 15 Gram(s) Oral once PRN Blood Glucose LESS THAN 70 milliGRAM(s)/deciliter  ondansetron Injectable 4 milliGRAM(s) IV Push every 6 hours PRN Nausea and/or Vomiting      PHYSICAL EXAM:    Vital Signs Last 24 Hrs  T(C): 31.9 (27 Aug 2020 13:00), Max: 33.9 (26 Aug 2020 16:00)  T(F): 89.5 (27 Aug 2020 13:00), Max: 93 (26 Aug 2020 16:00)  HR: 78 (27 Aug 2020 13:00) (64 - 107)  BP: 96/43 (27 Aug 2020 13:00) (75/59 - 100/47)  BP(mean): 54 (27 Aug 2020 13:00) (54 - 63)  RR: 34 (27 Aug 2020 13:00) (34 - 41)  SpO2: 66% (27 Aug 2020 13:00) (63% - 100%)    see primary team physical exam. deferred due to COVID     LABS:                      10.8   6.07  )-----------( 36       ( 27 Aug 2020 09:00 )             36.1     08-27    142  |  118<H>  |  10.0  ----------------------------<  73  3.8   |  14.0<L>  |  0.38<L>    Ca    4.2<LL>      27 Aug 2020 09:00  Phos  4.4     08-27  Mg     1.5     08-27    TPro  2.2<L>  /  Alb  <1.0<L>  /  TBili  2.8<H>  /  DBili  x   /  AST  >7000<H>  /  ALT  >6450<H>  /  AlkPhos  223<H>  08-27    PT/INR - ( 27 Aug 2020 09:00 )   PT: 105.7 sec;   INR: 10.18 ratio       PTT - ( 27 Aug 2020 09:00 )  PTT:80.8 sec    I&O's Summary    26 Aug 2020 07:01  -  27 Aug 2020 07:00  --------------------------------------------------------  IN: 06749.7 mL / OUT: 262 mL / NET: 95067.7 mL    27 Aug 2020 07:01  -  27 Aug 2020 14:02  --------------------------------------------------------  IN: 2830.8 mL / OUT: 167 mL / NET: 2663.8 mL    RADIOLOGY & ADDITIONAL STUDIES:    ADVANCE DIRECTIVES: Full code

## 2020-08-27 NOTE — PROGRESS NOTE ADULT - ASSESSMENT
Cardiac arrest ~ ROSC 20min  Acute hypoxic/ hypercarbic respiratory failure   Shock , ?Distributive vs cardiogenic  Viral/ multifocal PNA, ARDS  R PTX s/p chest tube  Severe metabolic acidosis/ lactic acidosis  Shock liver/transaminitis  COVID 19 +  Pneumomediastinum    Team had extensive GOC discussion w/ multiple family members, daughters Velma/ Yvonne and niece Elvi. Pt has been progressing into MOF, with worsening LFTs/ coagulopathy, persistent acidosis, SUNI and lactemia while on max dose of Levophed, Epinephrine, Vasopressin and CVVHD. As per discussion w/ renal, plan to stop HD as it would be medically futile to continue with aggressive measures.   Will continue max dose pressors, bicarb infusion, amiodarone and empiric Abx for now. Stopped basal/ bolus insulin, remains on ISS  Continue full vent support and attempt to maintain SpO2 > 92% while on Volume AC. Chest tube to suction. Hold off on SAT/SBT     Pt has abysmal prognosis and this was conveyed to family members as well. Palliative care on case. Critical Care time: 35 minutes assessing presenting problems of acute illness that poses high probability of life threatening deterioration or end organ damage/dysfunction.  Medical decision making including Initiating plan of care, reviewing data, reviewing radiology, discussing with multidisciplinary team, non inclusive of procedures

## 2020-08-27 NOTE — ADVANCED PRACTICE NURSE CONSULT - RECOMMEDATIONS
after chart review pls consider changing lantus to 30 units qhs and humalog 8 units before meals + moderate ss, pls adjust insulin doses w changes in steroids ximena  insulin pen teaching  glucometer teaching
after chart review pls consider decrease lantus to 25 units qhs
after chart review pls consider increase humalog pre meal insulin to 10 units +ss
after chart review pls consider increase lantus to 30 units and   increase pre meal humalog to 10. pt is on steroids
after chart review pls consider changing fs to q6 w humalog 5 units while pt is npo in order to improve glycemic control while on steroids
after chart review pls consider dc humalog 5 units q6. and   lantus

## 2020-08-27 NOTE — PROGRESS NOTE ADULT - REASON FOR ADMISSION
Acute Hypoxemic Respiratory Failure, COVID19 PNA

## 2020-08-30 LAB
GLUCOSE BLDC GLUCOMTR-MCNC: 48 MG/DL — LOW (ref 70–99)
GLUCOSE BLDC GLUCOMTR-MCNC: 48 MG/DL — LOW (ref 70–99)

## 2020-08-31 PROCEDURE — 99285 EMERGENCY DEPT VISIT HI MDM: CPT | Mod: 25

## 2020-08-31 PROCEDURE — 36600 WITHDRAWAL OF ARTERIAL BLOOD: CPT

## 2020-08-31 PROCEDURE — 83605 ASSAY OF LACTIC ACID: CPT

## 2020-08-31 PROCEDURE — 82728 ASSAY OF FERRITIN: CPT

## 2020-08-31 PROCEDURE — 88312 SPECIAL STAINS GROUP 1: CPT

## 2020-08-31 PROCEDURE — 85610 PROTHROMBIN TIME: CPT

## 2020-08-31 PROCEDURE — 80053 COMPREHEN METABOLIC PANEL: CPT

## 2020-08-31 PROCEDURE — U0003: CPT

## 2020-08-31 PROCEDURE — 87040 BLOOD CULTURE FOR BACTERIA: CPT

## 2020-08-31 PROCEDURE — 71275 CT ANGIOGRAPHY CHEST: CPT

## 2020-08-31 PROCEDURE — 85384 FIBRINOGEN ACTIVITY: CPT

## 2020-08-31 PROCEDURE — 94003 VENT MGMT INPAT SUBQ DAY: CPT

## 2020-08-31 PROCEDURE — 93306 TTE W/DOPPLER COMPLETE: CPT

## 2020-08-31 PROCEDURE — 94660 CPAP INITIATION&MGMT: CPT

## 2020-08-31 PROCEDURE — 82565 ASSAY OF CREATININE: CPT

## 2020-08-31 PROCEDURE — 86140 C-REACTIVE PROTEIN: CPT

## 2020-08-31 PROCEDURE — 85018 HEMOGLOBIN: CPT

## 2020-08-31 PROCEDURE — 82947 ASSAY GLUCOSE BLOOD QUANT: CPT

## 2020-08-31 PROCEDURE — 84100 ASSAY OF PHOSPHORUS: CPT

## 2020-08-31 PROCEDURE — 92950 HEART/LUNG RESUSCITATION CPR: CPT

## 2020-08-31 PROCEDURE — 84484 ASSAY OF TROPONIN QUANT: CPT

## 2020-08-31 PROCEDURE — 71045 X-RAY EXAM CHEST 1 VIEW: CPT

## 2020-08-31 PROCEDURE — 87086 URINE CULTURE/COLONY COUNT: CPT

## 2020-08-31 PROCEDURE — 86900 BLOOD TYPING SEROLOGIC ABO: CPT

## 2020-08-31 PROCEDURE — 86850 RBC ANTIBODY SCREEN: CPT

## 2020-08-31 PROCEDURE — 36415 COLL VENOUS BLD VENIPUNCTURE: CPT

## 2020-08-31 PROCEDURE — 85730 THROMBOPLASTIN TIME PARTIAL: CPT

## 2020-08-31 PROCEDURE — 85027 COMPLETE CBC AUTOMATED: CPT

## 2020-08-31 PROCEDURE — 80076 HEPATIC FUNCTION PANEL: CPT

## 2020-08-31 PROCEDURE — 84132 ASSAY OF SERUM POTASSIUM: CPT

## 2020-08-31 PROCEDURE — 82330 ASSAY OF CALCIUM: CPT

## 2020-08-31 PROCEDURE — 81001 URINALYSIS AUTO W/SCOPE: CPT

## 2020-08-31 PROCEDURE — 93005 ELECTROCARDIOGRAM TRACING: CPT

## 2020-08-31 PROCEDURE — 82435 ASSAY OF BLOOD CHLORIDE: CPT

## 2020-08-31 PROCEDURE — 85379 FIBRIN DEGRADATION QUANT: CPT

## 2020-08-31 PROCEDURE — 83615 LACTATE (LD) (LDH) ENZYME: CPT

## 2020-08-31 PROCEDURE — 84295 ASSAY OF SERUM SODIUM: CPT

## 2020-08-31 PROCEDURE — 84478 ASSAY OF TRIGLYCERIDES: CPT

## 2020-08-31 PROCEDURE — 84145 PROCALCITONIN (PCT): CPT

## 2020-08-31 PROCEDURE — 85014 HEMATOCRIT: CPT

## 2020-08-31 PROCEDURE — 83880 ASSAY OF NATRIURETIC PEPTIDE: CPT

## 2020-08-31 PROCEDURE — 86769 SARS-COV-2 COVID-19 ANTIBODY: CPT

## 2020-08-31 PROCEDURE — 82803 BLOOD GASES ANY COMBINATION: CPT

## 2020-08-31 PROCEDURE — 83735 ASSAY OF MAGNESIUM: CPT

## 2020-08-31 PROCEDURE — 88313 SPECIAL STAINS GROUP 2: CPT

## 2020-08-31 PROCEDURE — 82550 ASSAY OF CK (CPK): CPT

## 2020-08-31 PROCEDURE — T1013: CPT

## 2020-08-31 PROCEDURE — 94760 N-INVAS EAR/PLS OXIMETRY 1: CPT

## 2020-08-31 PROCEDURE — 82962 GLUCOSE BLOOD TEST: CPT

## 2020-08-31 PROCEDURE — 86901 BLOOD TYPING SEROLOGIC RH(D): CPT

## 2020-08-31 PROCEDURE — 94002 VENT MGMT INPAT INIT DAY: CPT

## 2020-08-31 PROCEDURE — 80048 BASIC METABOLIC PNL TOTAL CA: CPT

## 2020-08-31 PROCEDURE — 83036 HEMOGLOBIN GLYCOSYLATED A1C: CPT

## 2021-06-12 NOTE — ED ADULT NURSE NOTE - NSHOSCREENINGQ1_ED_ALL_ED
Pt presents to triage with concern of high blood pressures. Pt was taking BPs at home and at 5:00pm had bp of 152/90, and at 7;15 pm before coming in was 135/89. Did series of 4 BPs her and all WNL. Urine sent and negative for protein. Pt denies blurred vision or epigastric pain. LE edema mild +1. Baby reactive Cat 1 tracing.  Dr Boland notified and instructions given to discharge home.    No

## 2021-08-27 NOTE — PROGRESS NOTE ADULT - SUBJECTIVE AND OBJECTIVE BOX
[facility]  INFECTIOUS DISEASES AND INTERNAL MEDICINE at [place]  =======================================================  Quinn Rivera MD  Diplomates American Board of Internal Medicine and Infectious Diseases  Tel: [phone]      Fax: [phone]  =======================================================    BOUCHRA HERNANDEZ [id number]    Follow up: COVID 19+   feels better, remains on hiflow 30lpm/90%    Allergies:  No Known Allergies  Paper tray only (Unknown)      Medications:  acetaminophen   Tablet .. 650 milliGRAM(s) Oral every 4 hours PRN  acetaminophen  Suppository .. 650 milliGRAM(s) Rectal every 4 hours PRN  ALBUTerol    90 MICROgram(s) HFA Inhaler 2 Puff(s) Inhalation every 4 hours PRN  ascorbic acid 1500 milliGRAM(s) Oral daily  aspirin enteric coated 325 milliGRAM(s) Oral daily  dextrose 40% Gel 15 Gram(s) Oral once PRN  dextrose 5%. 1000 milliLiter(s) IV Continuous <Continuous>  dextrose 50% Injectable 12.5 Gram(s) IV Push once  dextrose 50% Injectable 25 Gram(s) IV Push once  dextrose 50% Injectable 25 Gram(s) IV Push once  enoxaparin Injectable 40 milliGRAM(s) SubCutaneous every 12 hours  glucagon  Injectable 1 milliGRAM(s) IntraMuscular once PRN  insulin glargine Injectable (LANTUS) 30 Unit(s) SubCutaneous at bedtime  insulin lispro (HumaLOG) corrective regimen sliding scale   SubCutaneous three times a day before meals  insulin lispro (HumaLOG) corrective regimen sliding scale   SubCutaneous at bedtime  insulin lispro Injectable (HumaLOG) 7 Unit(s) SubCutaneous three times a day before meals  lisinopril 5 milliGRAM(s) Oral daily  methylPREDNISolone sodium succinate Injectable 60 milliGRAM(s) IV Push every 12 hours  ondansetron Injectable 4 milliGRAM(s) IV Push every 6 hours PRN  remdesivir  IVPB 100 milliGRAM(s) IV Intermittent every 24 hours  thiamine 100 milliGRAM(s) Oral daily  zinc sulfate 220 milliGRAM(s) Oral daily            REVIEW OF SYSTEMS:  CONSTITUTIONAL:  No Fever or chills  HEENT:   No diplopia or blurred vision.  No earache, sore throat or runny nose.  CARDIOVASCULAR:  No pressure, squeezing, strangling, tightness, heaviness or aching about the chest, neck, axilla or epigastrium.  RESPIRATORY:  No cough, shortness of breath  GASTROINTESTINAL:  No nausea, vomiting or diarrhea.  GENITOURINARY:  No dysuria, frequency or urgency. No Blood in urine  MUSCULOSKELETAL:  no joint aches, no muscle pain  SKIN:  No change in skin, hair or nails.  NEUROLOGIC:  No Headaches, seizures or weakness.  PSYCHIATRIC:  No disorder of thought or mood.  ENDOCRINE:  No heat or cold intolerance  HEMATOLOGICAL:  No easy bruising or bleeding.            Physical Exam:  ICU Vital Signs Last 24 Hrs  T(C): 37 (20 Aug 2020 08:46), Max: 37 (20 Aug 2020 08:46)  T(F): 98.6 (20 Aug 2020 08:46), Max: 98.6 (20 Aug 2020 08:46)  HR: 84 (20 Aug 2020 08:46) (76 - 99)  BP: 105/65 (20 Aug 2020 08:46) (105/65 - 123/79)  BP(mean): --  ABP: --  ABP(mean): --  RR: 20 (20 Aug 2020 08:46) (19 - 20)  SpO2: 90% (20 Aug 2020 09:17) (90% - 97%)      GEN: NAD, pleasant  HEENT: normocephalic and atraumatic. EOMI. PERRL.  Anicteric   NECK: Supple.   LUNGS: Clear to auscultation.  HEART: Regular rate and rhythm without murmur.  ABDOMEN: Soft, nontender, and nondistended.  Positive bowel sounds.    : No CVA tenderness  EXTREMITIES: Without any edema.  MSK: no joint swelling  NEUROLOGIC: Cranial nerves II through XII are grossly intact. No focal deficits  PSYCHIATRIC: Appropriate affect .  SKIN: No Rash      Labs: tender to touch/flat

## 2023-05-30 NOTE — ED ADULT NURSE NOTE - CHPI ED NUR DURATION
Lemuel Dc is a 38 y.o. male here for   Chief Complaint   Patient presents with    Diabetes       1. Have you been to the ER or an urgent care clinic since your last visit?  - no    2. Have you been hospitalized since your last visit? - no    3. Have you seen or consulted any other health care providers outside of the [address] [place] [address] since your last visit?   Include any pap smears or colon screening.- PCP bilaterally, no respiratory distress    Abdomen: No lipodystrophy    Musculoskeletal: no proximal muscle weakness in upper or lower extremities        Neurological:alert and oriented , no focal deficits    Psychiatric: normal mood and affect     Skin: Normal turgor, no rash      Diabetic feet exam ; July 2021      H/o partial or complete amputation of foot : No   H/o previous foot ulceration : No   H/o pre - ulcerative callus : No   H/o peripheral neuropathy and callus : No   H/o poor circulation  : No   Foot deformity : Yes, flatfeet, callus               Lab Results   Component Value Date    GFRAA 139 02/07/2022        Lab Results   Component Value Date    LABA1C 12.1 (H) 05/23/2023    LABA1C 12.5 (H) 12/28/2022    LABA1C 12.8 (H) 05/11/2022         Lab Results   Component Value Date     05/23/2023    K 3.5 05/23/2023     05/23/2023    CO2 28 05/23/2023    BUN 10 05/23/2023    CREATININE 0.66 (L) 05/23/2023    GFRAA 139 02/07/2022    GLUCOSE 191 (H) 05/23/2023    CALCIUM 8.8 05/23/2023    MALBCR 8 05/23/2023    LABMICR 1.53 05/23/2023                    Assessment/Plan:             1. Type 2 Diabetes Mellitus       poorly controlled DM ,  non adherent to diet as well as checking the blood glucose. Lantus 100 units,  Humalog 25 units before breakfast and before dinner   Counseled again, seen dietitian in the past, refer to nutritionist again, list of starches and protein given to the patient. victoza   Weight loss stressed   metformin   DKA x2, PAM negative   Flavored water, avoid juices sodas including diet drinks, cut down on snack portions     He was asked to come back in a week with the meter, medications including insulin and also bring a family member      2. HTN :  Continue current therapy , CPAP       Anaphylaxis with ACE       3. Hyperlipidemia :  Continue statin. 4.Obesity:Body mass index is 43.33 kg/m².    Discussed about the importance of exercise and carbohydrate portion yesterday

## 2023-10-25 NOTE — PROGRESS NOTE ADULT - SUBJECTIVE AND OBJECTIVE BOX
[facility]  INFECTIOUS DISEASES AND INTERNAL MEDICINE at [place]  =======================================================  Quinn Rivera MD  Diplomates American Board of Internal Medicine and Infectious Diseases  Tel: [phone]      Fax: [phone]  =======================================================    BOUCHRA HERNANDEZ [id number]    Follow up:    Allergies:  No Known Allergies  Paper tray only (Unknown)      Medications:  acetaminophen   Tablet .. 650 milliGRAM(s) Oral every 4 hours PRN  acetaminophen  Suppository .. 650 milliGRAM(s) Rectal every 4 hours PRN  ALBUTerol    90 MICROgram(s) HFA Inhaler 2 Puff(s) Inhalation every 4 hours PRN  ascorbic acid 1500 milliGRAM(s) Oral daily  aspirin enteric coated 325 milliGRAM(s) Oral daily  dextrose 40% Gel 15 Gram(s) Oral once PRN  dextrose 5%. 1000 milliLiter(s) IV Continuous <Continuous>  dextrose 50% Injectable 12.5 Gram(s) IV Push once  dextrose 50% Injectable 25 Gram(s) IV Push once  dextrose 50% Injectable 25 Gram(s) IV Push once  enoxaparin Injectable 40 milliGRAM(s) SubCutaneous every 12 hours  glucagon  Injectable 1 milliGRAM(s) IntraMuscular once PRN  insulin glargine Injectable (LANTUS) 30 Unit(s) SubCutaneous at bedtime  insulin lispro (HumaLOG) corrective regimen sliding scale   SubCutaneous three times a day before meals  insulin lispro (HumaLOG) corrective regimen sliding scale   SubCutaneous at bedtime  insulin lispro Injectable (HumaLOG) 7 Unit(s) SubCutaneous three times a day before meals  lisinopril 5 milliGRAM(s) Oral daily  methylPREDNISolone sodium succinate Injectable 60 milliGRAM(s) IV Push every 12 hours  ondansetron Injectable 4 milliGRAM(s) IV Push every 6 hours PRN  remdesivir  IVPB 100 milliGRAM(s) IV Intermittent every 24 hours  thiamine 100 milliGRAM(s) Oral daily  zinc sulfate 220 milliGRAM(s) Oral daily            REVIEW OF SYSTEMS:  CONSTITUTIONAL:  No Fever or chills  HEENT:   No diplopia or blurred vision.  No earache, sore throat or runny nose.  CARDIOVASCULAR:  No pressure, squeezing, strangling, tightness, heaviness or aching about the chest, neck, axilla or epigastrium.  RESPIRATORY:  No cough, shortness of breath  GASTROINTESTINAL:  No nausea, vomiting or diarrhea.  GENITOURINARY:  No dysuria, frequency or urgency. No Blood in urine  MUSCULOSKELETAL:  no joint aches, no muscle pain  SKIN:  No change in skin, hair or nails.  NEUROLOGIC:  No Headaches, seizures or weakness.  PSYCHIATRIC:  No disorder of thought or mood.  ENDOCRINE:  No heat or cold intolerance  HEMATOLOGICAL:  No easy bruising or bleeding.            Physical Exam:  ICU Vital Signs Last 24 Hrs  T(C): 36.6 (19 Aug 2020 08:30), Max: 36.6 (19 Aug 2020 08:30)  T(F): 97.9 (19 Aug 2020 08:30), Max: 97.9 (19 Aug 2020 08:30)  HR: 86 (19 Aug 2020 08:30) (67 - 86)  BP: 125/83 (19 Aug 2020 08:30) (97/57 - 125/83)  BP(mean): --  ABP: --  ABP(mean): --  RR: 19 (19 Aug 2020 08:30) (18 - 20)  SpO2: 93% (19 Aug 2020 08:30) (92% - 95%)      GEN: NAD, pleasant  HEENT: normocephalic and atraumatic. EOMI. PERRL.  Anicteric   NECK: Supple.   LUNGS: Clear to auscultation.  HEART: Regular rate and rhythm without murmur.  ABDOMEN: Soft, nontender, and nondistended.  Positive bowel sounds.    : No CVA tenderness  EXTREMITIES: Without any edema.  MSK: no joint swelling  NEUROLOGIC: Cranial nerves II through XII are grossly intact. No focal deficits  PSYCHIATRIC: Appropriate affect .  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 Also urethral strictures dilated